# Patient Record
Sex: FEMALE | Race: WHITE | NOT HISPANIC OR LATINO | Employment: OTHER | URBAN - METROPOLITAN AREA
[De-identification: names, ages, dates, MRNs, and addresses within clinical notes are randomized per-mention and may not be internally consistent; named-entity substitution may affect disease eponyms.]

---

## 2018-05-30 ENCOUNTER — OFFICE VISIT (OUTPATIENT)
Dept: CARDIOLOGY | Facility: CLINIC | Age: 83
End: 2018-05-30
Payer: MEDICARE

## 2018-05-30 VITALS
SYSTOLIC BLOOD PRESSURE: 155 MMHG | TEMPERATURE: 72 F | HEIGHT: 63 IN | DIASTOLIC BLOOD PRESSURE: 80 MMHG | BODY MASS INDEX: 25.62 KG/M2 | WEIGHT: 144.63 LBS

## 2018-05-30 DIAGNOSIS — I10 ESSENTIAL HYPERTENSION: Chronic | ICD-10-CM

## 2018-05-30 DIAGNOSIS — E78.2 MIXED HYPERLIPIDEMIA: Chronic | ICD-10-CM

## 2018-05-30 DIAGNOSIS — Z95.810 AICD (AUTOMATIC CARDIOVERTER/DEFIBRILLATOR) PRESENT: Primary | Chronic | ICD-10-CM

## 2018-05-30 DIAGNOSIS — I25.10 CORONARY ARTERY DISEASE INVOLVING NATIVE CORONARY ARTERY OF NATIVE HEART WITHOUT ANGINA PECTORIS: Chronic | ICD-10-CM

## 2018-05-30 PROCEDURE — 99999 PR PBB SHADOW E&M-EST. PATIENT-LVL II: CPT | Mod: PBBFAC,,, | Performed by: INTERNAL MEDICINE

## 2018-05-30 PROCEDURE — 99212 OFFICE O/P EST SF 10 MIN: CPT | Mod: PBBFAC | Performed by: INTERNAL MEDICINE

## 2018-05-30 PROCEDURE — 99214 OFFICE O/P EST MOD 30 MIN: CPT | Mod: S$PBB,,, | Performed by: INTERNAL MEDICINE

## 2018-05-30 RX ORDER — CARVEDILOL 6.25 MG/1
6.25 TABLET ORAL 2 TIMES DAILY WITH MEALS
Qty: 180 TABLET | Refills: 3 | Status: SHIPPED | OUTPATIENT
Start: 2018-05-30

## 2018-05-30 RX ORDER — CARVEDILOL 6.25 MG/1
6.25 TABLET ORAL 2 TIMES DAILY WITH MEALS
COMMUNITY
End: 2018-05-30 | Stop reason: SDUPTHER

## 2018-05-30 RX ORDER — CLONAZEPAM 0.5 MG/1
0.5 TABLET ORAL NIGHTLY
COMMUNITY

## 2019-01-14 ENCOUNTER — TELEPHONE (OUTPATIENT)
Dept: CARDIOLOGY | Facility: CLINIC | Age: 84
End: 2019-01-14
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
## 2019-01-14 NOTE — TELEPHONE ENCOUNTER
----- Message from Kelly Zuluaga LPN sent at 1/10/2019 11:41 AM CST -----  Contact: Jordyn Mendenhall's Office  Rich states she need some info about patient's defibrillator and how it is being checked. Also she is faxing some paperwork to be filled out by you. She can be reached at [phone] Rich.

## 2022-08-19 ENCOUNTER — HOSPITAL ENCOUNTER (INPATIENT)
Facility: HOSPITAL | Age: 87
LOS: 3 days | Discharge: HOME HEALTH | DRG: 871 | End: 2022-08-22
Attending: EMERGENCY MEDICINE | Admitting: FAMILY MEDICINE
Payer: MEDICARE

## 2022-08-19 DIAGNOSIS — I25.9 CHRONIC ISCHEMIC HEART DISEASE: ICD-10-CM

## 2022-08-19 DIAGNOSIS — I21.4 NSTEMI (NON-ST ELEVATED MYOCARDIAL INFARCTION): ICD-10-CM

## 2022-08-19 DIAGNOSIS — R10.9 ABDOMINAL PAIN: ICD-10-CM

## 2022-08-19 DIAGNOSIS — I50.9 CHF (CONGESTIVE HEART FAILURE): ICD-10-CM

## 2022-08-19 DIAGNOSIS — M86.9 OSTEOMYELITIS OF RIGHT FOOT, UNSPECIFIED TYPE: ICD-10-CM

## 2022-08-19 DIAGNOSIS — M86.171 OTHER ACUTE OSTEOMYELITIS OF RIGHT FOOT: Primary | ICD-10-CM

## 2022-08-19 LAB
ALBUMIN SERPL BCP-MCNC: 3.7 G/DL (ref 3.5–5.2)
ALP SERPL-CCNC: 115 U/L (ref 55–135)
ALT SERPL W/O P-5'-P-CCNC: 33 U/L (ref 10–44)
ANION GAP SERPL CALC-SCNC: 17 MMOL/L (ref 8–16)
AST SERPL-CCNC: 53 U/L (ref 10–40)
BASOPHILS # BLD AUTO: 0.12 K/UL (ref 0–0.2)
BASOPHILS NFR BLD: 0.5 % (ref 0–1.9)
BILIRUB SERPL-MCNC: 0.7 MG/DL (ref 0.1–1)
BNP SERPL-MCNC: 428 PG/ML (ref 0–99)
BUN SERPL-MCNC: 29 MG/DL (ref 8–23)
CALCIUM SERPL-MCNC: 9.6 MG/DL (ref 8.7–10.5)
CHLORIDE SERPL-SCNC: 106 MMOL/L (ref 95–110)
CO2 SERPL-SCNC: 17 MMOL/L (ref 23–29)
CREAT SERPL-MCNC: 1.2 MG/DL (ref 0.5–1.4)
DIFFERENTIAL METHOD: ABNORMAL
EOSINOPHIL # BLD AUTO: 0 K/UL (ref 0–0.5)
EOSINOPHIL NFR BLD: 0.1 % (ref 0–8)
ERYTHROCYTE [DISTWIDTH] IN BLOOD BY AUTOMATED COUNT: 14.9 % (ref 11.5–14.5)
EST. GFR  (NO RACE VARIABLE): 43 ML/MIN/1.73 M^2
GLUCOSE SERPL-MCNC: 119 MG/DL (ref 70–110)
HCT VFR BLD AUTO: 46.7 % (ref 37–48.5)
HGB BLD-MCNC: 15.3 G/DL (ref 12–16)
IMM GRANULOCYTES # BLD AUTO: 0.29 K/UL (ref 0–0.04)
IMM GRANULOCYTES NFR BLD AUTO: 1.1 % (ref 0–0.5)
LIPASE SERPL-CCNC: 16 U/L (ref 4–60)
LYMPHOCYTES # BLD AUTO: 0.6 K/UL (ref 1–4.8)
LYMPHOCYTES NFR BLD: 2.2 % (ref 18–48)
MCH RBC QN AUTO: 28.9 PG (ref 27–31)
MCHC RBC AUTO-ENTMCNC: 32.8 G/DL (ref 32–36)
MCV RBC AUTO: 88 FL (ref 82–98)
MONOCYTES # BLD AUTO: 1.4 K/UL (ref 0.3–1)
MONOCYTES NFR BLD: 5.5 % (ref 4–15)
NEUTROPHILS # BLD AUTO: 23.5 K/UL (ref 1.8–7.7)
NEUTROPHILS NFR BLD: 90.6 % (ref 38–73)
NRBC BLD-RTO: 0 /100 WBC
PLATELET # BLD AUTO: 209 K/UL (ref 150–450)
PMV BLD AUTO: 9.2 FL (ref 9.2–12.9)
POTASSIUM SERPL-SCNC: 3.4 MMOL/L (ref 3.5–5.1)
PROT SERPL-MCNC: 7.8 G/DL (ref 6–8.4)
RBC # BLD AUTO: 5.29 M/UL (ref 4–5.4)
SODIUM SERPL-SCNC: 140 MMOL/L (ref 136–145)
TROPONIN I SERPL DL<=0.01 NG/ML-MCNC: 0.32 NG/ML (ref 0–0.03)
WBC # BLD AUTO: 25.94 K/UL (ref 3.9–12.7)

## 2022-08-19 PROCEDURE — 93010 EKG 12-LEAD: ICD-10-PCS | Mod: ,,, | Performed by: STUDENT IN AN ORGANIZED HEALTH CARE EDUCATION/TRAINING PROGRAM

## 2022-08-19 PROCEDURE — 84484 ASSAY OF TROPONIN QUANT: CPT | Mod: 91 | Performed by: EMERGENCY MEDICINE

## 2022-08-19 PROCEDURE — 80053 COMPREHEN METABOLIC PANEL: CPT | Performed by: EMERGENCY MEDICINE

## 2022-08-19 PROCEDURE — 11000001 HC ACUTE MED/SURG PRIVATE ROOM

## 2022-08-19 PROCEDURE — 99285 EMERGENCY DEPT VISIT HI MDM: CPT | Mod: 25

## 2022-08-19 PROCEDURE — 96375 TX/PRO/DX INJ NEW DRUG ADDON: CPT

## 2022-08-19 PROCEDURE — 83605 ASSAY OF LACTIC ACID: CPT | Performed by: FAMILY MEDICINE

## 2022-08-19 PROCEDURE — 93010 ELECTROCARDIOGRAM REPORT: CPT | Mod: ,,, | Performed by: STUDENT IN AN ORGANIZED HEALTH CARE EDUCATION/TRAINING PROGRAM

## 2022-08-19 PROCEDURE — 83690 ASSAY OF LIPASE: CPT | Performed by: EMERGENCY MEDICINE

## 2022-08-19 PROCEDURE — 93005 ELECTROCARDIOGRAM TRACING: CPT

## 2022-08-19 PROCEDURE — 85610 PROTHROMBIN TIME: CPT | Performed by: EMERGENCY MEDICINE

## 2022-08-19 PROCEDURE — 85025 COMPLETE CBC W/AUTO DIFF WBC: CPT | Performed by: EMERGENCY MEDICINE

## 2022-08-19 PROCEDURE — 83880 ASSAY OF NATRIURETIC PEPTIDE: CPT | Performed by: EMERGENCY MEDICINE

## 2022-08-19 PROCEDURE — 96374 THER/PROPH/DIAG INJ IV PUSH: CPT

## 2022-08-19 PROCEDURE — U0002 COVID-19 LAB TEST NON-CDC: HCPCS | Performed by: EMERGENCY MEDICINE

## 2022-08-19 PROCEDURE — 63600175 PHARM REV CODE 636 W HCPCS: Performed by: EMERGENCY MEDICINE

## 2022-08-19 PROCEDURE — 85730 THROMBOPLASTIN TIME PARTIAL: CPT | Performed by: EMERGENCY MEDICINE

## 2022-08-19 RX ORDER — CEFEPIME HYDROCHLORIDE 1 G/50ML
2 INJECTION, SOLUTION INTRAVENOUS
Status: DISCONTINUED | OUTPATIENT
Start: 2022-08-20 | End: 2022-08-22 | Stop reason: HOSPADM

## 2022-08-19 RX ORDER — HEPARIN SODIUM,PORCINE/D5W 25000/250
0-40 INTRAVENOUS SOLUTION INTRAVENOUS CONTINUOUS
Status: DISCONTINUED | OUTPATIENT
Start: 2022-08-19 | End: 2022-08-20

## 2022-08-19 RX ORDER — KETOROLAC TROMETHAMINE 30 MG/ML
15 INJECTION, SOLUTION INTRAMUSCULAR; INTRAVENOUS
Status: COMPLETED | OUTPATIENT
Start: 2022-08-19 | End: 2022-08-19

## 2022-08-19 RX ORDER — ACETAMINOPHEN 325 MG/1
650 TABLET ORAL EVERY 6 HOURS PRN
Status: DISCONTINUED | OUTPATIENT
Start: 2022-08-20 | End: 2022-08-22 | Stop reason: HOSPADM

## 2022-08-19 RX ORDER — FLUOXETINE HYDROCHLORIDE 20 MG/1
20 CAPSULE ORAL DAILY
Status: DISCONTINUED | OUTPATIENT
Start: 2022-08-20 | End: 2022-08-22 | Stop reason: HOSPADM

## 2022-08-19 RX ORDER — ONDANSETRON 2 MG/ML
4 INJECTION INTRAMUSCULAR; INTRAVENOUS
Status: COMPLETED | OUTPATIENT
Start: 2022-08-19 | End: 2022-08-19

## 2022-08-19 RX ORDER — ONDANSETRON 4 MG/1
4 TABLET, FILM COATED ORAL EVERY 6 HOURS PRN
Status: DISCONTINUED | OUTPATIENT
Start: 2022-08-20 | End: 2022-08-22 | Stop reason: HOSPADM

## 2022-08-19 RX ORDER — POTASSIUM CHLORIDE 20 MEQ/1
40 TABLET, EXTENDED RELEASE ORAL ONCE
Status: COMPLETED | OUTPATIENT
Start: 2022-08-20 | End: 2022-08-20

## 2022-08-19 RX ORDER — SODIUM CHLORIDE 9 MG/ML
INJECTION, SOLUTION INTRAVENOUS ONCE
Status: DISCONTINUED | OUTPATIENT
Start: 2022-08-20 | End: 2022-08-22 | Stop reason: HOSPADM

## 2022-08-19 RX ORDER — CLONAZEPAM 0.5 MG/1
0.5 TABLET ORAL NIGHTLY
Status: DISCONTINUED | OUTPATIENT
Start: 2022-08-20 | End: 2022-08-22 | Stop reason: HOSPADM

## 2022-08-19 RX ORDER — CARVEDILOL 6.25 MG/1
6.25 TABLET ORAL 2 TIMES DAILY WITH MEALS
Status: DISCONTINUED | OUTPATIENT
Start: 2022-08-20 | End: 2022-08-22 | Stop reason: HOSPADM

## 2022-08-19 RX ORDER — LEVOTHYROXINE SODIUM 75 UG/1
75 TABLET ORAL
Status: DISCONTINUED | OUTPATIENT
Start: 2022-08-20 | End: 2022-08-22 | Stop reason: HOSPADM

## 2022-08-19 RX ORDER — ASPIRIN 81 MG/1
81 TABLET ORAL DAILY
Status: DISCONTINUED | OUTPATIENT
Start: 2022-08-20 | End: 2022-08-22 | Stop reason: HOSPADM

## 2022-08-19 RX ORDER — HYDRALAZINE HYDROCHLORIDE 20 MG/ML
10 INJECTION INTRAMUSCULAR; INTRAVENOUS EVERY 6 HOURS PRN
Status: DISCONTINUED | OUTPATIENT
Start: 2022-08-20 | End: 2022-08-22 | Stop reason: HOSPADM

## 2022-08-19 RX ADMIN — KETOROLAC TROMETHAMINE 15 MG: 30 INJECTION, SOLUTION INTRAMUSCULAR at 09:08

## 2022-08-19 RX ADMIN — ONDANSETRON 4 MG: 2 INJECTION INTRAMUSCULAR; INTRAVENOUS at 09:08

## 2022-08-20 PROBLEM — L08.9 FOOT INFECTION: Status: ACTIVE | Noted: 2022-08-20

## 2022-08-20 PROBLEM — R65.10 SIRS (SYSTEMIC INFLAMMATORY RESPONSE SYNDROME): Status: ACTIVE | Noted: 2022-08-20

## 2022-08-20 PROBLEM — R65.20 SEVERE SEPSIS: Status: ACTIVE | Noted: 2022-08-20

## 2022-08-20 PROBLEM — A41.9 SEVERE SEPSIS: Status: ACTIVE | Noted: 2022-08-20

## 2022-08-20 LAB
ALBUMIN SERPL BCP-MCNC: 3.6 G/DL (ref 3.5–5.2)
ALP SERPL-CCNC: 105 U/L (ref 55–135)
ALT SERPL W/O P-5'-P-CCNC: 34 U/L (ref 10–44)
ANION GAP SERPL CALC-SCNC: 16 MMOL/L (ref 8–16)
AORTIC ROOT ANNULUS: 2.88 CM
APTT BLDCRRT: 31.2 SEC (ref 21–32)
APTT BLDCRRT: 41.3 SEC (ref 21–32)
APTT BLDCRRT: 51.8 SEC (ref 21–32)
ASCENDING AORTA: 2.64 CM
AST SERPL-CCNC: 46 U/L (ref 10–40)
AV INDEX (PROSTH): 0.72
AV MEAN GRADIENT: 5 MMHG
AV PEAK GRADIENT: 8 MMHG
AV VALVE AREA: 2.03 CM2
AV VELOCITY RATIO: 0.69
BACTERIA #/AREA URNS HPF: ABNORMAL /HPF
BASOPHILS # BLD AUTO: 0.1 K/UL (ref 0–0.2)
BASOPHILS NFR BLD: 0.4 % (ref 0–1.9)
BILIRUB SERPL-MCNC: 0.9 MG/DL (ref 0.1–1)
BILIRUB UR QL STRIP: NEGATIVE
BSA FOR ECHO PROCEDURE: 1.73 M2
BUN SERPL-MCNC: 33 MG/DL (ref 8–23)
CALCIUM SERPL-MCNC: 9.2 MG/DL (ref 8.7–10.5)
CHLORIDE SERPL-SCNC: 104 MMOL/L (ref 95–110)
CLARITY UR: ABNORMAL
CO2 SERPL-SCNC: 22 MMOL/L (ref 23–29)
COLOR UR: YELLOW
CREAT SERPL-MCNC: 1.3 MG/DL (ref 0.5–1.4)
CRP SERPL-MCNC: 101.8 MG/L (ref 0–8.2)
CV ECHO LV RWT: 0.91 CM
DIFFERENTIAL METHOD: ABNORMAL
DOP CALC AO PEAK VEL: 1.4 M/S
DOP CALC AO VTI: 27.8 CM
DOP CALC LVOT AREA: 2.8 CM2
DOP CALC LVOT DIAMETER: 1.9 CM
DOP CALC LVOT PEAK VEL: 0.97 M/S
DOP CALC LVOT STROKE VOLUME: 56.39 CM3
DOP CALC RVOT PEAK VEL: 0.6 M/S
DOP CALC RVOT VTI: 12.4 CM
DOP CALCLVOT PEAK VEL VTI: 19.9 CM
E WAVE DECELERATION TIME: 320.37 MSEC
E/A RATIO: 0.75
E/E' RATIO: 20.67 M/S
ECHO LV POSTERIOR WALL: 1.5 CM (ref 0.6–1.1)
EJECTION FRACTION: 55 %
EOSINOPHIL # BLD AUTO: 0 K/UL (ref 0–0.5)
EOSINOPHIL NFR BLD: 0.1 % (ref 0–8)
ERYTHROCYTE [DISTWIDTH] IN BLOOD BY AUTOMATED COUNT: 15.4 % (ref 11.5–14.5)
ERYTHROCYTE [SEDIMENTATION RATE] IN BLOOD BY WESTERGREN METHOD: 18 MM/HR (ref 0–20)
EST. GFR  (NO RACE VARIABLE): 39 ML/MIN/1.73 M^2
FRACTIONAL SHORTENING: 27 % (ref 28–44)
GLUCOSE SERPL-MCNC: 98 MG/DL (ref 70–110)
GLUCOSE UR QL STRIP: NEGATIVE
HCT VFR BLD AUTO: 48.6 % (ref 37–48.5)
HGB BLD-MCNC: 14.9 G/DL (ref 12–16)
HGB UR QL STRIP: ABNORMAL
HYALINE CASTS #/AREA URNS LPF: 0 /LPF
IMM GRANULOCYTES # BLD AUTO: 0.13 K/UL (ref 0–0.04)
IMM GRANULOCYTES NFR BLD AUTO: 0.5 % (ref 0–0.5)
INR PPP: 1.1 (ref 0.8–1.2)
INTERVENTRICULAR SEPTUM: 1.69 CM (ref 0.6–1.1)
IVC DIAMETER: 1.54 CM
IVRT: 60.89 MSEC
KETONES UR QL STRIP: NEGATIVE
LA MAJOR: 4.89 CM
LA MINOR: 4.76 CM
LA WIDTH: 3.8 CM
LACTATE SERPL-SCNC: 1.8 MMOL/L (ref 0.5–2.2)
LEFT ATRIUM SIZE: 3.5 CM
LEFT ATRIUM VOLUME INDEX: 32.3 ML/M2
LEFT ATRIUM VOLUME: 54.54 CM3
LEFT INTERNAL DIMENSION IN SYSTOLE: 2.41 CM (ref 2.1–4)
LEFT VENTRICLE DIASTOLIC VOLUME INDEX: 26.38 ML/M2
LEFT VENTRICLE DIASTOLIC VOLUME: 44.58 ML
LEFT VENTRICLE MASS INDEX: 118 G/M2
LEFT VENTRICLE SYSTOLIC VOLUME INDEX: 12.1 ML/M2
LEFT VENTRICLE SYSTOLIC VOLUME: 20.42 ML
LEFT VENTRICULAR INTERNAL DIMENSION IN DIASTOLE: 3.31 CM (ref 3.5–6)
LEFT VENTRICULAR MASS: 198.92 G
LEUKOCYTE ESTERASE UR QL STRIP: ABNORMAL
LV LATERAL E/E' RATIO: 18.6 M/S
LV SEPTAL E/E' RATIO: 23.25 M/S
LVOT MG: 2.79 MMHG
LVOT MV: 0.83 CM/S
LYMPHOCYTES # BLD AUTO: 1 K/UL (ref 1–4.8)
LYMPHOCYTES NFR BLD: 4.3 % (ref 18–48)
MCH RBC QN AUTO: 28.1 PG (ref 27–31)
MCHC RBC AUTO-ENTMCNC: 30.7 G/DL (ref 32–36)
MCV RBC AUTO: 92 FL (ref 82–98)
MICROSCOPIC COMMENT: ABNORMAL
MONOCYTES # BLD AUTO: 0.9 K/UL (ref 0.3–1)
MONOCYTES NFR BLD: 3.6 % (ref 4–15)
MV PEAK A VEL: 1.24 M/S
MV PEAK E VEL: 0.93 M/S
MV STENOSIS PRESSURE HALF TIME: 92.91 MS
MV VALVE AREA P 1/2 METHOD: 2.37 CM2
NEUTROPHILS # BLD AUTO: 21.6 K/UL (ref 1.8–7.7)
NEUTROPHILS NFR BLD: 91.1 % (ref 38–73)
NITRITE UR QL STRIP: NEGATIVE
NRBC BLD-RTO: 0 /100 WBC
PH UR STRIP: 6 [PH] (ref 5–8)
PISA MRMAX VEL: 3.37 M/S
PISA TR MAX VEL: 2.34 M/S
PLATELET # BLD AUTO: 251 K/UL (ref 150–450)
PMV BLD AUTO: 11.1 FL (ref 9.2–12.9)
POTASSIUM SERPL-SCNC: 4.3 MMOL/L (ref 3.5–5.1)
PROT SERPL-MCNC: 7.1 G/DL (ref 6–8.4)
PROT UR QL STRIP: ABNORMAL
PROTHROMBIN TIME: 11.4 SEC (ref 9–12.5)
PV MEAN GRADIENT: 0.64 MMHG
RA MAJOR: 3.84 CM
RA PRESSURE: 3 MMHG
RA WIDTH: 2.9 CM
RBC # BLD AUTO: 5.3 M/UL (ref 4–5.4)
RBC #/AREA URNS HPF: 11 /HPF (ref 0–4)
SARS-COV-2 RDRP RESP QL NAA+PROBE: NEGATIVE
SODIUM SERPL-SCNC: 142 MMOL/L (ref 136–145)
SP GR UR STRIP: 1.02 (ref 1–1.03)
SQUAMOUS #/AREA URNS HPF: 6 /HPF
STJ: 2.74 CM
TDI LATERAL: 0.05 M/S
TDI SEPTAL: 0.04 M/S
TDI: 0.05 M/S
TR MAX PG: 22 MMHG
TR MEAN GRADIENT: 19 MMHG
TRICUSPID ANNULAR PLANE SYSTOLIC EXCURSION: 1.8 CM
TROPONIN I SERPL DL<=0.01 NG/ML-MCNC: 0.36 NG/ML (ref 0–0.03)
TROPONIN I SERPL DL<=0.01 NG/ML-MCNC: 0.4 NG/ML (ref 0–0.03)
TV REST PULMONARY ARTERY PRESSURE: 25 MMHG
URN SPEC COLLECT METH UR: ABNORMAL
UROBILINOGEN UR STRIP-ACNC: NEGATIVE EU/DL
WBC # BLD AUTO: 23.71 K/UL (ref 3.9–12.7)
WBC #/AREA URNS HPF: >100 /HPF (ref 0–5)
WBC CLUMPS URNS QL MICRO: ABNORMAL

## 2022-08-20 PROCEDURE — 87186 SC STD MICRODIL/AGAR DIL: CPT | Mod: 59 | Performed by: PODIATRIST

## 2022-08-20 PROCEDURE — 36415 COLL VENOUS BLD VENIPUNCTURE: CPT | Performed by: EMERGENCY MEDICINE

## 2022-08-20 PROCEDURE — 87088 URINE BACTERIA CULTURE: CPT | Performed by: EMERGENCY MEDICINE

## 2022-08-20 PROCEDURE — 81000 URINALYSIS NONAUTO W/SCOPE: CPT | Performed by: EMERGENCY MEDICINE

## 2022-08-20 PROCEDURE — 21400001 HC TELEMETRY ROOM

## 2022-08-20 PROCEDURE — 63600175 PHARM REV CODE 636 W HCPCS: Performed by: EMERGENCY MEDICINE

## 2022-08-20 PROCEDURE — 87040 BLOOD CULTURE FOR BACTERIA: CPT | Mod: 59 | Performed by: FAMILY MEDICINE

## 2022-08-20 PROCEDURE — 99222 PR INITIAL HOSPITAL CARE,LEVL II: ICD-10-PCS | Mod: 25,,, | Performed by: STUDENT IN AN ORGANIZED HEALTH CARE EDUCATION/TRAINING PROGRAM

## 2022-08-20 PROCEDURE — 87077 CULTURE AEROBIC IDENTIFY: CPT | Mod: 59 | Performed by: PODIATRIST

## 2022-08-20 PROCEDURE — 87077 CULTURE AEROBIC IDENTIFY: CPT | Performed by: EMERGENCY MEDICINE

## 2022-08-20 PROCEDURE — 86140 C-REACTIVE PROTEIN: CPT | Performed by: FAMILY MEDICINE

## 2022-08-20 PROCEDURE — 87070 CULTURE OTHR SPECIMN AEROBIC: CPT | Performed by: PODIATRIST

## 2022-08-20 PROCEDURE — 99222 1ST HOSP IP/OBS MODERATE 55: CPT | Mod: 25,,, | Performed by: STUDENT IN AN ORGANIZED HEALTH CARE EDUCATION/TRAINING PROGRAM

## 2022-08-20 PROCEDURE — 85025 COMPLETE CBC W/AUTO DIFF WBC: CPT | Performed by: EMERGENCY MEDICINE

## 2022-08-20 PROCEDURE — 80053 COMPREHEN METABOLIC PANEL: CPT | Performed by: STUDENT IN AN ORGANIZED HEALTH CARE EDUCATION/TRAINING PROGRAM

## 2022-08-20 PROCEDURE — 85651 RBC SED RATE NONAUTOMATED: CPT | Performed by: FAMILY MEDICINE

## 2022-08-20 PROCEDURE — 87086 URINE CULTURE/COLONY COUNT: CPT | Performed by: EMERGENCY MEDICINE

## 2022-08-20 PROCEDURE — 36415 COLL VENOUS BLD VENIPUNCTURE: CPT | Performed by: FAMILY MEDICINE

## 2022-08-20 PROCEDURE — 99222 PR INITIAL HOSPITAL CARE,LEVL II: ICD-10-PCS | Mod: ,,, | Performed by: PODIATRIST

## 2022-08-20 PROCEDURE — 85730 THROMBOPLASTIN TIME PARTIAL: CPT | Mod: 91 | Performed by: FAMILY MEDICINE

## 2022-08-20 PROCEDURE — 87186 SC STD MICRODIL/AGAR DIL: CPT | Performed by: EMERGENCY MEDICINE

## 2022-08-20 PROCEDURE — 84484 ASSAY OF TROPONIN QUANT: CPT | Performed by: FAMILY MEDICINE

## 2022-08-20 PROCEDURE — 85730 THROMBOPLASTIN TIME PARTIAL: CPT | Performed by: FAMILY MEDICINE

## 2022-08-20 PROCEDURE — 63600175 PHARM REV CODE 636 W HCPCS: Performed by: FAMILY MEDICINE

## 2022-08-20 PROCEDURE — 36415 COLL VENOUS BLD VENIPUNCTURE: CPT | Performed by: STUDENT IN AN ORGANIZED HEALTH CARE EDUCATION/TRAINING PROGRAM

## 2022-08-20 PROCEDURE — 25000003 PHARM REV CODE 250: Performed by: FAMILY MEDICINE

## 2022-08-20 PROCEDURE — 99222 1ST HOSP IP/OBS MODERATE 55: CPT | Mod: ,,, | Performed by: PODIATRIST

## 2022-08-20 RX ORDER — SERTRALINE HYDROCHLORIDE 25 MG/1
25 TABLET, FILM COATED ORAL DAILY
COMMUNITY
End: 2022-08-22

## 2022-08-20 RX ORDER — ASPIRIN 325 MG
325 TABLET, DELAYED RELEASE (ENTERIC COATED) ORAL DAILY
COMMUNITY

## 2022-08-20 RX ORDER — HYDROCODONE BITARTRATE AND ACETAMINOPHEN 5; 325 MG/1; MG/1
1 TABLET ORAL EVERY 6 HOURS PRN
Status: DISCONTINUED | OUTPATIENT
Start: 2022-08-20 | End: 2022-08-22

## 2022-08-20 RX ADMIN — CLONAZEPAM 0.5 MG: 0.5 TABLET ORAL at 09:08

## 2022-08-20 RX ADMIN — HYDRALAZINE HYDROCHLORIDE 10 MG: 20 INJECTION, SOLUTION INTRAMUSCULAR; INTRAVENOUS at 05:08

## 2022-08-20 RX ADMIN — CARVEDILOL 6.25 MG: 6.25 TABLET, FILM COATED ORAL at 05:08

## 2022-08-20 RX ADMIN — LEVOTHYROXINE SODIUM 75 MCG: 75 TABLET ORAL at 06:08

## 2022-08-20 RX ADMIN — ACETAMINOPHEN 650 MG: 325 TABLET ORAL at 06:08

## 2022-08-20 RX ADMIN — POTASSIUM CHLORIDE 40 MEQ: 1500 TABLET, EXTENDED RELEASE ORAL at 02:08

## 2022-08-20 RX ADMIN — FLUOXETINE 20 MG: 20 CAPSULE ORAL at 09:08

## 2022-08-20 RX ADMIN — HYDROCODONE BITARTRATE AND ACETAMINOPHEN 1 TABLET: 5; 325 TABLET ORAL at 10:08

## 2022-08-20 RX ADMIN — CEFEPIME 2 G: 2 INJECTION, POWDER, FOR SOLUTION INTRAVENOUS at 02:08

## 2022-08-20 RX ADMIN — CARVEDILOL 6.25 MG: 6.25 TABLET, FILM COATED ORAL at 09:08

## 2022-08-20 RX ADMIN — VANCOMYCIN HYDROCHLORIDE 1500 MG: 1.5 INJECTION, POWDER, LYOPHILIZED, FOR SOLUTION INTRAVENOUS at 06:08

## 2022-08-20 RX ADMIN — HEPARIN SODIUM 12 UNITS/KG/HR: 10000 INJECTION, SOLUTION INTRAVENOUS at 02:08

## 2022-08-20 RX ADMIN — ASPIRIN 81 MG: 81 TABLET, COATED ORAL at 09:08

## 2022-08-20 NOTE — CARE UPDATE
Reviewed xray of left foot, suspect osteomyelitis of 2nd toe. Podiatry consulted. Awaiting recs from Cardiology. Troponin trend down. Continue IV antibiotics, mild decrease in WBC count. Afebrile.     Advance Care Planning     Date: 08/20/2022  Per discussion with patient and daughter, patient stated she is a DNI. Patient stated in the event her heart stops she wants compressions attempted but does not want to be intubated. Educated patient intubation generally follows full compressions due to trauma to the chest, patient stated understanding but continued to state she does not want to Intubated in the event of cardiac arrest.

## 2022-08-20 NOTE — SUBJECTIVE & OBJECTIVE
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    Review of patient's allergies indicates:   Allergen Reactions    Zetia [ezetimibe]     Atorvastatin     Avelox [moxifloxacin]     Erythromycin     Lisinopril     Norvasc [amlodipine]     Pcn [penicillins]     Statins-hmg-coa reductase inhibitors        No current facility-administered medications on file prior to encounter.     Current Outpatient Medications on File Prior to Encounter   Medication Sig    aspirin (ECOTRIN) 81 MG EC tablet Take 81 mg by mouth once daily.    carvedilol (COREG) 6.25 MG tablet Take 1 tablet (6.25 mg total) by mouth 2 (two) times daily with meals.    clonazePAM (KLONOPIN) 0.5 MG tablet Take 0.5 mg by mouth every evening.    cloNIDine (CATAPRES) 0.2 MG tablet Take 1 tablet by mouth every evening.    fluoxetine (PROZAC) 20 MG tablet Take 20 mg by mouth once daily.    levothyroxine (SYNTHROID) 75 MCG tablet Take 75 mcg by mouth once daily.    naproxen sodium (ALEVE) 220 mg Cap Take 1 tablet by mouth every evening. Aleve PM    omega-3 fatty acids (FISH OIL CONCENTRATE ORAL) Take 1,000 mg by mouth once daily.    triamterene-hydrochlorothiazide 37.5-25 mg (MAXZIDE-25) 37.5-25 mg per tablet Take 0.5 tablets by mouth once daily.     Family History       Problem Relation (Age of Onset)     Heart attack Father    Hyperlipidemia Father    Hypertension Father, Daughter          Tobacco Use    Smoking status: Never Smoker    Smokeless tobacco: Never Used   Substance and Sexual Activity    Alcohol use: Yes     Alcohol/week: 7.0 standard drinks     Types: 7 Glasses of wine per week    Drug use: No    Sexual activity: Not on file     Review of Systems   Constitutional:  Negative for fatigue and fever.   HENT:  Negative for sinus pressure.    Eyes:  Negative for visual disturbance.   Respiratory:  Negative for shortness of breath.    Cardiovascular:  Negative for chest pain.   Gastrointestinal:  Positive for abdominal pain, nausea and vomiting.   Genitourinary:  Negative for difficulty urinating.   Musculoskeletal:  Negative for back pain.   Skin:  Negative for rash.   Neurological:  Negative for headaches.   Psychiatric/Behavioral:  Negative for confusion.    Objective:     Vital Signs (Most Recent):  Temp: 98.9 °F (37.2 °C) (08/19/22 1957)  Pulse: 87 (08/19/22 2329)  Resp: 20 (08/19/22 2246)  BP: (!) 160/72 (08/19/22 2329)  SpO2: 97 % (08/19/22 2207)   Vital Signs (24h Range):  Temp:  [98.9 °F (37.2 °C)] 98.9 °F (37.2 °C)  Pulse:  [] 87  Resp:  [20-34] 20  SpO2:  [94 %-97 %] 97 %  BP: (160-176)/() 160/72     Weight: 70.3 kg (155 lb)  Body mass index is 29.29 kg/m².    Physical Exam  Constitutional:       General: She is not in acute distress.     Appearance: She is well-developed. She is not diaphoretic.   HENT:      Head: Normocephalic and atraumatic.   Eyes:      Pupils: Pupils are equal, round, and reactive to light.   Cardiovascular:      Rate and Rhythm: Normal rate and regular rhythm.      Heart sounds: Normal heart sounds. No murmur heard.    No friction rub. No gallop.      Comments: aicd  Pulmonary:      Effort: Pulmonary effort is normal. No respiratory distress.      Breath sounds: Normal breath sounds. No stridor. No wheezing or rales.   Abdominal:      General: Bowel sounds are  normal. There is no distension.      Palpations: Abdomen is soft. There is no mass.      Tenderness: There is no abdominal tenderness. There is no guarding.   Musculoskeletal:      Right lower leg: No edema.      Left lower leg: No edema.   Skin:     General: Skin is warm.      Findings: No erythema.      Comments: Left foot wound on dorsum, purulence expressed; right 2nd digit toe discoloration   Neurological:      Mental Status: She is alert and oriented to person, place, and time.         CRANIAL NERVES     CN III, IV, VI   Pupils are equal, round, and reactive to light.     Significant Labs:   Results for orders placed or performed during the hospital encounter of 08/19/22   CBC W/ AUTO DIFFERENTIAL   Result Value Ref Range    WBC 25.94 (H) 3.90 - 12.70 K/uL    RBC 5.29 4.00 - 5.40 M/uL    Hemoglobin 15.3 12.0 - 16.0 g/dL    Hematocrit 46.7 37.0 - 48.5 %    MCV 88 82 - 98 fL    MCH 28.9 27.0 - 31.0 pg    MCHC 32.8 32.0 - 36.0 g/dL    RDW 14.9 (H) 11.5 - 14.5 %    Platelets 209 150 - 450 K/uL    MPV 9.2 9.2 - 12.9 fL    Immature Granulocytes 1.1 (H) 0.0 - 0.5 %    Gran # (ANC) 23.5 (H) 1.8 - 7.7 K/uL    Immature Grans (Abs) 0.29 (H) 0.00 - 0.04 K/uL    Lymph # 0.6 (L) 1.0 - 4.8 K/uL    Mono # 1.4 (H) 0.3 - 1.0 K/uL    Eos # 0.0 0.0 - 0.5 K/uL    Baso # 0.12 0.00 - 0.20 K/uL    nRBC 0 0 /100 WBC    Gran % 90.6 (H) 38.0 - 73.0 %    Lymph % 2.2 (L) 18.0 - 48.0 %    Mono % 5.5 4.0 - 15.0 %    Eosinophil % 0.1 0.0 - 8.0 %    Basophil % 0.5 0.0 - 1.9 %    Differential Method Automated    Comp. Metabolic Panel   Result Value Ref Range    Sodium 140 136 - 145 mmol/L    Potassium 3.4 (L) 3.5 - 5.1 mmol/L    Chloride 106 95 - 110 mmol/L    CO2 17 (L) 23 - 29 mmol/L    Glucose 119 (H) 70 - 110 mg/dL    BUN 29 (H) 8 - 23 mg/dL    Creatinine 1.2 0.5 - 1.4 mg/dL    Calcium 9.6 8.7 - 10.5 mg/dL    Total Protein 7.8 6.0 - 8.4 g/dL    Albumin 3.7 3.5 - 5.2 g/dL    Total Bilirubin 0.7 0.1 - 1.0 mg/dL    Alkaline Phosphatase 115 55  - 135 U/L    AST 53 (H) 10 - 40 U/L    ALT 33 10 - 44 U/L    Anion Gap 17 (H) 8 - 16 mmol/L    eGFR 43 (A) >60 mL/min/1.73 m^2   Lipase   Result Value Ref Range    Lipase 16 4 - 60 U/L   Troponin I #1   Result Value Ref Range    Troponin I 0.317 (H) 0.000 - 0.026 ng/mL   BNP   Result Value Ref Range     (H) 0 - 99 pg/mL   Troponin I #2   Result Value Ref Range    Troponin I 0.403 (H) 0.000 - 0.026 ng/mL   APTT   Result Value Ref Range    aPTT 31.2 21.0 - 32.0 sec   Protime-INR   Result Value Ref Range    Prothrombin Time 11.4 9.0 - 12.5 sec    INR 1.1 0.8 - 1.2   COVID-19 Rapid Screening   Result Value Ref Range    SARS-CoV-2 RNA, Amplification, Qual Negative Negative   Lactic acid, plasma   Result Value Ref Range    Lactate (Lactic Acid) 1.8 0.5 - 2.2 mmol/L        Significant Imaging: X-Ray Chest AP Portable  Narrative: EXAMINATION:  XR CHEST AP PORTABLE    CLINICAL HISTORY:  abd pain;    TECHNIQUE:  Single frontal view of the chest was performed.    COMPARISON:  None    FINDINGS:  Left chest pacemaker.  Mild interstitial prominence.The lungs are clear, with normal appearance of pulmonary vasculature and no pleural effusion or pneumothorax.    The cardiac silhouette is normal in size. The hilar and mediastinal contours are unremarkable.    Bones are intact.  Impression: No acute abnormality.    Electronically signed by: Jason Garsia  Date:    08/19/2022  Time:    21:44  CT Abdomen Pelvis  Without Contrast  Narrative: EXAMINATION:  CT ABDOMEN PELVIS WITHOUT CONTRAST    CLINICAL HISTORY:  Abdominal abscess/infection suspected;    TECHNIQUE:  Low dose axial images, sagittal and coronal reformations were obtained from the lung bases to the pubic symphysis, 30 mL of oral Omnipaque 350 was administered..    COMPARISON:  None    FINDINGS:  Heart: Pacemaker lead.  No pericardial effusion as far seen.    Lung Bases: Right anterior lung base pulmonary nodules measuring up to 9 mm.  Mild subsegmental  atelectasis.    Liver: Hepatomegaly with no focal hepatic lesions.    Gallbladder: No calcified gallstones.  Slight hyperdensity may relate to sludge    Bile Ducts: No evidence of dilated ducts.    Pancreas: No mass or peripancreatic fat stranding.    Spleen: Focal splenic calcification.    Adrenals: Unremarkable.    Kidneys/ Ureters: Left renal cyst measuring 2.7 x 2.1 cm.    Bladder: No evidence of wall thickening.    Reproductive organs: Status post hysterectomy    GI Tract/Mesentery: No evidence of bowel obstruction or inflammation. No secondary signs of appendicitis.  Colonic diverticulosis.  Hiatal hernia    Peritoneal Space: No ascites. No free air.  16 x 16 mm peripheral low attending partially calcified lesion in the left mid abdomen may relate to epiploic appendagitis of uncertain clinical significance.  Correlate to site of pain.  See coronal image 45, series 601    Retroperitoneum: No significant adenopathy.    Abdominal wall: Fat containing umbilical hernia.    Vasculature: No aneurysm.    Bones: No acute fracture.  Impression: 16 x 16 mm peripheral low attending partially calcified lesion in the left mid abdomen may relate to epiploic appendagitis of uncertain clinical significance. Correlate to site of pain. See coronal image 45, series 601    Right anterior lung base pulmonary nodules measuring up to 9 mm.    Atherosclerotic changes    Small hiatal hernia; renal cyst; focal splenic calcification, colonic diverticulosis    No evidence for abscess    All CT scans   are performed using dose optimization techniques including the following: automated exposure control; adjustment of the mA and/or kV; use of iterative reconstruction technique.  Dose modulation was employed for ALARA by means of: Automated exposure control; adjustment of the mA and/or kV according to patient size (this includes techniques or standardized protocols for targeted exams where dose is matched to indication/reason for exam; i.e.  extremities or head); and/or use of iterative reconstructive technique.    Electronically signed by: Jason Garsia  Date:    08/19/2022  Time:    21:28

## 2022-08-20 NOTE — ED PROVIDER NOTES
SCRIBE #1 NOTE: I, Doug Lynch, am scribing for, and in the presence of, Alber Carr Jr., MD. I have scribed the entire note.       History     Chief Complaint   Patient presents with    Shortness of Breath     Shortness of breath since panic attack at noon today, now c/o CP on inhalation radiating to left shoulder, BBS = Bilat per [facility], O2 92% RA     Review of patient's allergies indicates:   Allergen Reactions    Zetia [ezetimibe]     Atorvastatin     Avelox [moxifloxacin]     Erythromycin     Lisinopril     Norvasc [amlodipine]     Pcn [penicillins]     Statins-hmg-coa reductase inhibitors     Cefepime Rash         History of Present Illness     HPI    8/19/2022, 8:56 PM  History obtained from the patient      History of Present Illness: Marysol Yancey is a 89 y.o. female patient with a PMHx of hysterectomy, appendix removal, MI, CHF, HLD, and defibrillator implant in L chest (installed in 2013) who presents to the Emergency Department for evaluation of R-side abdominal pain which onset approx. 13:30-14:00 today. Symptoms are constant and moderate in severity. Pt reports sxs are worsened with deep breaths. Associated sxs include dizziness, n/v, SOB, and tremors of upper body. Patient denies any fever, diarrhea, dysuria, hematuria, syncope, light-headedness, CP, and all other sxs at this time. Pt reports taking clonazepam today which helped to alleviate some sxs. Pt also reports of taking low-dose aspirin regularly. Per pt, she has experienced this abdominal pain x 2 times in recent past. No further complaints or concerns at this time.       Arrival mode: Ambulance Service    PCP: Bruno Nugent MD        Past Medical History:  Past Medical History:   Diagnosis Date    Abnormal ECG 12/17/2014    AICD (automatic cardioverter/defibrillator) present 6/14/2013    CAD (coronary artery disease) 6/14/2013    Cancer     basil cell carcinoma    Cardiac arrest 6/14/2013    CHF (congestive heart failure)     CRI  (chronic renal insufficiency) 6/14/2013    Depression 6/14/2013    Diabetes mellitus     Fatigue 6/14/2013    Heart block     Hyperlipidemia     Hypertension     LBBB (left bundle branch block) 12/17/2014    Mitral regurgitation 6/14/2013    Mixed hyperlipidemia 6/14/2013    Pulmonary nodule 6/14/2013    Thyroid disease     TR (tricuspid regurgitation) 6/14/2013    Ventricular fibrillation 6/14/2013       Past Surgical History:  No past surgical history on file.      Family History:  Family History   Problem Relation Age of Onset    Heart attack Father     Hyperlipidemia Father     Hypertension Father     Anemia Neg Hx     Arrhythmia Neg Hx     Asthma Neg Hx     Clotting disorder Neg Hx     Fainting Neg Hx     Heart disease Neg Hx     Heart failure Neg Hx     Hypertension Daughter        Social History:  Social History     Tobacco Use    Smoking status: Never    Smokeless tobacco: Never   Substance and Sexual Activity    Alcohol use: Yes     Alcohol/week: 7.0 standard drinks     Types: 7 Glasses of wine per week    Drug use: No    Sexual activity: Not on file        Review of Systems     Review of Systems   Constitutional:  Negative for fever.   HENT:  Negative for sore throat.    Respiratory:  Positive for shortness of breath.    Cardiovascular:  Negative for chest pain.   Gastrointestinal:  Positive for abdominal pain (R side), nausea and vomiting. Negative for diarrhea.   Genitourinary:  Negative for dysuria and hematuria.   Musculoskeletal:  Negative for back pain.   Skin:  Negative for rash.   Neurological:  Positive for dizziness and tremors (upper body). Negative for syncope, weakness and light-headedness.   Hematological:  Does not bruise/bleed easily.   All other systems reviewed and are negative.   Physical Exam     Initial Vitals [08/19/22 1957]   BP Pulse Resp Temp SpO2   (!) 173/103 110 20 98.9 °F (37.2 °C) (!) 94 %      MAP       --          Physical Exam  Nursing Notes and Vital Signs  Reviewed.  Constitutional: Patient is in no acute distress. Well-developed and well-nourished.  Head: Atraumatic. Normocephalic.  Eyes: PERRL. EOM intact. Conjunctivae are not pale. No scleral icterus.  ENT: Mucous membranes are moist. Oropharynx is clear and symmetric.    Neck: Supple. Full ROM.  Cardiovascular: Regular rate. Regular rhythm. No murmurs, rubs, or gallops. Distal pulses are 2+ and symmetric.  Pulmonary/Chest: No respiratory distress. Clear to auscultation bilaterally. No wheezing or rales.  Abdominal: Soft and non-distended.  There is no tenderness.  No rebound, guarding, or rigidity.  Musculoskeletal: Moves all extremities. No obvious deformities. No edema.  Skin: Warm and dry.  Neurological:  Alert, awake, and oriented x 4. GCS 15. Answers questions appropriately. Normal speech. No acute focal neurological deficits are appreciated.  Psychiatric: Normal affect. Good eye contact. Appropriate in content.     ED Course   Critical Care    Date/Time: 9/9/2022 11:13 AM  Performed by: Alber Carr Jr., MD  Authorized by: Alber Carr Jr., MD   Direct patient critical care time: 10 minutes  Additional history critical care time: 10 minutes  Ordering / reviewing critical care time: 10 minutes  Documentation critical care time: 10 minutes  Consulting other physicians critical care time: 10 minutes  Consult with family critical care time: 10 minutes  Other critical care time: 15 minutes  Total critical care time (exclusive of procedural time) : 75 minutes  Critical care time was exclusive of separately billable procedures and treating other patients and teaching time.  Critical care was necessary to treat or prevent imminent or life-threatening deterioration of the following conditions: cardiac failure.  Critical care was time spent personally by me on the following activities: development of treatment plan with patient or surrogate, discussions with consultants, discussions with primary provider,  interpretation of cardiac output measurements, evaluation of patient's response to treatment, examination of patient, obtaining history from patient or surrogate, ordering and performing treatments and interventions, ordering and review of laboratory studies, ordering and review of radiographic studies, pulse oximetry, re-evaluation of patient's condition and review of old charts.      ED Vital Signs:  Vitals:    08/21/22 2100 08/21/22 2300 08/22/22 0100 08/22/22 0300   BP:   (!) 144/74    Pulse: 79 71 72 66   Resp:   18    Temp:   98.1 °F (36.7 °C)    TempSrc:   Oral    SpO2:   (!) 93%    Weight:       Height:        08/22/22 0437 08/22/22 0500 08/22/22 0504 08/22/22 0603   BP: (!) 188/82 (!) 180/79 (!) 213/91 (!) 116/57   Pulse: 68 69  68   Resp: 16      Temp: 98.3 °F (36.8 °C)      TempSrc: Oral      SpO2: 95%   96%   Weight: 65.7 kg (144 lb 13.5 oz)      Height:        08/22/22 0716 08/22/22 0915 08/22/22 1142 08/22/22 1231   BP: (!) 160/72   (!) 140/61   Pulse: 74 85 71 70   Resp: 18   18   Temp: 98.4 °F (36.9 °C)   97.9 °F (36.6 °C)   TempSrc: Oral   Oral   SpO2: 96%   96%   Weight:       Height:        08/22/22 1321 08/22/22 1506 08/22/22 1551   BP:   (!) 181/82   Pulse: 68 69 75   Resp:   18   Temp:   98.1 °F (36.7 °C)   TempSrc:   Oral   SpO2:   96%   Weight:      Height:          Abnormal Lab Results:  Labs Reviewed   CBC W/ AUTO DIFFERENTIAL - Abnormal; Notable for the following components:       Result Value    WBC 25.94 (*)     RDW 14.9 (*)     Immature Granulocytes 1.1 (*)     Gran # (ANC) 23.5 (*)     Immature Grans (Abs) 0.29 (*)     Lymph # 0.6 (*)     Mono # 1.4 (*)     Gran % 90.6 (*)     Lymph % 2.2 (*)     All other components within normal limits   COMPREHENSIVE METABOLIC PANEL - Abnormal; Notable for the following components:    Potassium 3.4 (*)     CO2 17 (*)     Glucose 119 (*)     BUN 29 (*)     AST 53 (*)     Anion Gap 17 (*)     eGFR 43 (*)     All other components within normal limits    URINALYSIS, REFLEX TO URINE CULTURE - Abnormal; Notable for the following components:    Appearance, UA Hazy (*)     Protein, UA 1+ (*)     Occult Blood UA 2+ (*)     Leukocytes, UA 3+ (*)     All other components within normal limits    Narrative:     Specimen Source->Urine   TROPONIN I - Abnormal; Notable for the following components:    Troponin I 0.317 (*)     All other components within normal limits   B-TYPE NATRIURETIC PEPTIDE - Abnormal; Notable for the following components:     (*)     All other components within normal limits   TROPONIN I - Abnormal; Notable for the following components:    Troponin I 0.403 (*)     All other components within normal limits   URINALYSIS MICROSCOPIC - Abnormal; Notable for the following components:    RBC, UA 11 (*)     WBC, UA >100 (*)     WBC Clumps, UA Many (*)     All other components within normal limits    Narrative:     Specimen Source->Urine   LIPASE   APTT    Narrative:     Draw baseline aPTT prior to starting the heparin bolus or  infusion  (if patient is on warfarin prior to heparin therapy)   PROTIME-INR    Narrative:     Draw baseline aPTT prior to starting the heparin bolus or  infusion  (if patient is on warfarin prior to heparin therapy)   SARS-COV-2 RNA AMPLIFICATION, QUAL   LACTIC ACID, PLASMA        All Lab Results:  Results for orders placed or performed during the hospital encounter of 08/19/22   Blood culture    Specimen: Blood   Result Value Ref Range    Blood Culture, Routine No growth after 5 days.    Blood culture    Specimen: Blood   Result Value Ref Range    Blood Culture, Routine No growth after 5 days.    Urine culture    Specimen: Urine   Result Value Ref Range    Urine Culture, Routine (A)      STAPHYLOCOCCUS AUREUS  >100,000cfu/ml  No other significant isolate         Susceptibility    Staphylococcus aureus - CULTURE, URINE     Oxacillin <=0.25 Sensitive mcg/mL     Penicillin >8 Resistant mcg/mL     Trimeth/Sulfa <=0.5/9.5 Sensitive  mcg/mL   Aerobic culture    Specimen: Foot, Right; Abscess   Result Value Ref Range    Aerobic Bacterial Culture STAPHYLOCOCCUS AUREUS  Rare   (A)        Susceptibility    Staphylococcus aureus - CULTURE, AEROBIC  (SPECIFY SOURCE)     Clindamycin <=0.5 Sensitive mcg/mL     Erythromycin >4 Resistant mcg/mL     Oxacillin 0.5 Sensitive mcg/mL     Penicillin >8 Resistant mcg/mL     Trimeth/Sulfa <=0.5/9.5 Sensitive mcg/mL     Tetracycline <=4 Sensitive mcg/mL   CBC W/ AUTO DIFFERENTIAL   Result Value Ref Range    WBC 25.94 (H) 3.90 - 12.70 K/uL    RBC 5.29 4.00 - 5.40 M/uL    Hemoglobin 15.3 12.0 - 16.0 g/dL    Hematocrit 46.7 37.0 - 48.5 %    MCV 88 82 - 98 fL    MCH 28.9 27.0 - 31.0 pg    MCHC 32.8 32.0 - 36.0 g/dL    RDW 14.9 (H) 11.5 - 14.5 %    Platelets 209 150 - 450 K/uL    MPV 9.2 9.2 - 12.9 fL    Immature Granulocytes 1.1 (H) 0.0 - 0.5 %    Gran # (ANC) 23.5 (H) 1.8 - 7.7 K/uL    Immature Grans (Abs) 0.29 (H) 0.00 - 0.04 K/uL    Lymph # 0.6 (L) 1.0 - 4.8 K/uL    Mono # 1.4 (H) 0.3 - 1.0 K/uL    Eos # 0.0 0.0 - 0.5 K/uL    Baso # 0.12 0.00 - 0.20 K/uL    nRBC 0 0 /100 WBC    Gran % 90.6 (H) 38.0 - 73.0 %    Lymph % 2.2 (L) 18.0 - 48.0 %    Mono % 5.5 4.0 - 15.0 %    Eosinophil % 0.1 0.0 - 8.0 %    Basophil % 0.5 0.0 - 1.9 %    Differential Method Automated    Comp. Metabolic Panel   Result Value Ref Range    Sodium 140 136 - 145 mmol/L    Potassium 3.4 (L) 3.5 - 5.1 mmol/L    Chloride 106 95 - 110 mmol/L    CO2 17 (L) 23 - 29 mmol/L    Glucose 119 (H) 70 - 110 mg/dL    BUN 29 (H) 8 - 23 mg/dL    Creatinine 1.2 0.5 - 1.4 mg/dL    Calcium 9.6 8.7 - 10.5 mg/dL    Total Protein 7.8 6.0 - 8.4 g/dL    Albumin 3.7 3.5 - 5.2 g/dL    Total Bilirubin 0.7 0.1 - 1.0 mg/dL    Alkaline Phosphatase 115 55 - 135 U/L    AST 53 (H) 10 - 40 U/L    ALT 33 10 - 44 U/L    Anion Gap 17 (H) 8 - 16 mmol/L    eGFR 43 (A) >60 mL/min/1.73 m^2   Lipase   Result Value Ref Range    Lipase 16 4 - 60 U/L   Urinalysis, Reflex to Urine Culture  Urine, Clean Catch    Specimen: Urine   Result Value Ref Range    Specimen UA Urine, Clean Catch     Color, UA Yellow Yellow, Straw, Deepika    Appearance, UA Hazy (A) Clear    pH, UA 6.0 5.0 - 8.0    Specific Gravity, UA 1.020 1.005 - 1.030    Protein, UA 1+ (A) Negative    Glucose, UA Negative Negative    Ketones, UA Negative Negative    Bilirubin (UA) Negative Negative    Occult Blood UA 2+ (A) Negative    Nitrite, UA Negative Negative    Urobilinogen, UA Negative <2.0 EU/dL    Leukocytes, UA 3+ (A) Negative   Troponin I #1   Result Value Ref Range    Troponin I 0.317 (H) 0.000 - 0.026 ng/mL   BNP   Result Value Ref Range     (H) 0 - 99 pg/mL   Troponin I #2   Result Value Ref Range    Troponin I 0.403 (H) 0.000 - 0.026 ng/mL   APTT   Result Value Ref Range    aPTT 31.2 21.0 - 32.0 sec   Protime-INR   Result Value Ref Range    Prothrombin Time 11.4 9.0 - 12.5 sec    INR 1.1 0.8 - 1.2   COVID-19 Rapid Screening   Result Value Ref Range    SARS-CoV-2 RNA, Amplification, Qual Negative Negative   Lactic acid, plasma   Result Value Ref Range    Lactate (Lactic Acid) 1.8 0.5 - 2.2 mmol/L   Urinalysis Microscopic   Result Value Ref Range    RBC, UA 11 (H) 0 - 4 /hpf    WBC, UA >100 (H) 0 - 5 /hpf    WBC Clumps, UA Many (A) None-Rare    Bacteria Rare None-Occ /hpf    Squam Epithel, UA 6 /hpf    Hyaline Casts, UA 0 0-1/lpf /lpf    Microscopic Comment SEE COMMENT    Sedimentation rate   Result Value Ref Range    Sed Rate 18 0 - 20 mm/Hr   C-reactive protein   Result Value Ref Range    .8 (H) 0.0 - 8.2 mg/L   CBC auto differential   Result Value Ref Range    WBC 23.71 (H) 3.90 - 12.70 K/uL    RBC 5.30 4.00 - 5.40 M/uL    Hemoglobin 14.9 12.0 - 16.0 g/dL    Hematocrit 48.6 (H) 37.0 - 48.5 %    MCV 92 82 - 98 fL    MCH 28.1 27.0 - 31.0 pg    MCHC 30.7 (L) 32.0 - 36.0 g/dL    RDW 15.4 (H) 11.5 - 14.5 %    Platelets 251 150 - 450 K/uL    MPV 11.1 9.2 - 12.9 fL    Immature Granulocytes 0.5 0.0 - 0.5 %    Gran # (ANC)  21.6 (H) 1.8 - 7.7 K/uL    Immature Grans (Abs) 0.13 (H) 0.00 - 0.04 K/uL    Lymph # 1.0 1.0 - 4.8 K/uL    Mono # 0.9 0.3 - 1.0 K/uL    Eos # 0.0 0.0 - 0.5 K/uL    Baso # 0.10 0.00 - 0.20 K/uL    nRBC 0 0 /100 WBC    Gran % 91.1 (H) 38.0 - 73.0 %    Lymph % 4.3 (L) 18.0 - 48.0 %    Mono % 3.6 (L) 4.0 - 15.0 %    Eosinophil % 0.1 0.0 - 8.0 %    Basophil % 0.4 0.0 - 1.9 %    Differential Method Automated    Troponin I   Result Value Ref Range    Troponin I 0.360 (H) 0.000 - 0.026 ng/mL   APTT   Result Value Ref Range    aPTT 51.8 (H) 21.0 - 32.0 sec   Comprehensive metabolic panel   Result Value Ref Range    Sodium 142 136 - 145 mmol/L    Potassium 4.3 3.5 - 5.1 mmol/L    Chloride 104 95 - 110 mmol/L    CO2 22 (L) 23 - 29 mmol/L    Glucose 98 70 - 110 mg/dL    BUN 33 (H) 8 - 23 mg/dL    Creatinine 1.3 0.5 - 1.4 mg/dL    Calcium 9.2 8.7 - 10.5 mg/dL    Total Protein 7.1 6.0 - 8.4 g/dL    Albumin 3.6 3.5 - 5.2 g/dL    Total Bilirubin 0.9 0.1 - 1.0 mg/dL    Alkaline Phosphatase 105 55 - 135 U/L    AST 46 (H) 10 - 40 U/L    ALT 34 10 - 44 U/L    Anion Gap 16 8 - 16 mmol/L    eGFR 39 (A) >60 mL/min/1.73 m^2   APTT   Result Value Ref Range    aPTT 41.3 (H) 21.0 - 32.0 sec   Vancomycin, random   Result Value Ref Range    Vancomycin, Random 12.3 Not established ug/mL   Creatinine, serum   Result Value Ref Range    Creatinine 1.1 0.5 - 1.4 mg/dL    eGFR 48 (A) >60 mL/min/1.73 m^2   Vancomycin, random   Result Value Ref Range    Vancomycin, Random 13.6 Not established ug/mL   Creatinine, Serum   Result Value Ref Range    Creatinine 1.0 0.5 - 1.4 mg/dL    eGFR 54 (A) >60 mL/min/1.73 m^2   Echo Saline Bubble? No   Result Value Ref Range    BSA 1.73 m2    TDI SEPTAL 0.04 m/s    LV LATERAL E/E' RATIO 18.60 m/s    LV SEPTAL E/E' RATIO 23.25 m/s    LA WIDTH 3.80 cm    IVC diameter 1.54 cm    Left Ventricular Outflow Tract Mean Velocity 0.83 cm/s    Left Ventricular Outflow Tract Mean Gradient 2.79 mmHg    TV mean gradient 19 mmHg     TDI LATERAL 0.05 m/s    LVIDd 3.31 (A) 3.5 - 6.0 cm    IVS 1.69 (A) 0.6 - 1.1 cm    Posterior Wall 1.50 (A) 0.6 - 1.1 cm    Ao root annulus 2.88 cm    LVIDs 2.41 2.1 - 4.0 cm    FS 27 28 - 44 %    LA volume 54.54 cm3    STJ 2.74 cm    Ascending aorta 2.64 cm    LV mass 198.92 g    LA size 3.50 cm    TAPSE 1.80 cm    Left Ventricle Relative Wall Thickness 0.91 cm    AV mean gradient 5 mmHg    AV valve area 2.03 cm2    AV Velocity Ratio 0.69     AV index (prosthetic) 0.72     MV valve area p 1/2 method 2.37 cm2    E/A ratio 0.75     Mean e' 0.05 m/s    E wave deceleration time 320.37 msec    IVRT 60.89 msec    LVOT diameter 1.90 cm    LVOT area 2.8 cm2    LVOT peak pierce 0.97 m/s    LVOT peak VTI 19.90 cm    Ao peak pierec 1.40 m/s    Ao VTI 27.8 cm    RVOT peak pierce 0.60 m/s    RVOT peak VTI 12.4 cm    Mr max pierce 3.37 m/s    LVOT stroke volume 56.39 cm3    AV peak gradient 8 mmHg    PV mean gradient 0.64 mmHg    E/E' ratio 20.67 m/s    MV Peak E Pierce 0.93 m/s    TR Max Pierce 2.34 m/s    MV stenosis pressure 1/2 time 92.91 ms    MV Peak A Pierce 1.24 m/s    LV Systolic Volume 20.42 mL    LV Systolic Volume Index 12.1 mL/m2    LV Diastolic Volume 44.58 mL    LV Diastolic Volume Index 26.38 mL/m2    LA Volume Index 32.3 mL/m2    LV Mass Index 118 g/m2    RA Major Axis 3.84 cm    Left Atrium Minor Axis 4.76 cm    Left Atrium Major Axis 4.89 cm    Triscuspid Valve Regurgitation Peak Gradient 22 mmHg    RA Width 2.90 cm    Right Atrial Pressure (from IVC) 3 mmHg    EF 55 %    TV rest pulmonary artery pressure 25 mmHg         Imaging Results:  Imaging Results              X-Ray Foot 2 View Left (Final result)  Result time 08/20/22 08:16:48      Final result by Terry Fontenot MD (08/20/22 08:16:48)                   Impression:      See above.      Electronically signed by: Terry Fontenot MD  Date:    08/20/2022  Time:    08:16               Narrative:    EXAMINATION:  XR FOOT 2 VIEW LEFT    CLINICAL HISTORY:  Pain and swelling  left 2nd toe., 2nd digit infection, r/o osteo;    TECHNIQUE:  Standard radiography performed.    COMPARISON:  None    FINDINGS:  Limited two views of the left foot.  Demineralization/osteopenia.  Prominent hallux valgus deformity of the 1st metatarsophalangeal joint with bony bunion formation.  Possible disruption of the nail of the 1st toe    Soft tissue swelling with soft tissue gas of the 2nd toe consistent with cellulitis.  No definite bone destruction.  No definite osteomyelitis.                                       X-Ray Chest AP Portable (Final result)  Result time 08/19/22 21:44:17      Final result by Ady Gomez MD (08/19/22 21:44:17)                   Impression:      No acute abnormality.      Electronically signed by: Jason Garsia  Date:    08/19/2022  Time:    21:44               Narrative:    EXAMINATION:  XR CHEST AP PORTABLE    CLINICAL HISTORY:  abd pain;    TECHNIQUE:  Single frontal view of the chest was performed.    COMPARISON:  None    FINDINGS:  Left chest pacemaker.  Mild interstitial prominence.The lungs are clear, with normal appearance of pulmonary vasculature and no pleural effusion or pneumothorax.    The cardiac silhouette is normal in size. The hilar and mediastinal contours are unremarkable.    Bones are intact.                                       CT Abdomen Pelvis  Without Contrast (Final result)  Result time 08/19/22 21:28:23      Final result by Ady Gomez MD (08/19/22 21:28:23)                   Impression:      16 x 16 mm peripheral low attending partially calcified lesion in the left mid abdomen may relate to epiploic appendagitis of uncertain clinical significance. Correlate to site of pain. See coronal image 45, series 601    Right anterior lung base pulmonary nodules measuring up to 9 mm.    Atherosclerotic changes    Small hiatal hernia; renal cyst; focal splenic calcification, colonic diverticulosis    No evidence for abscess    All CT scans   are performed  using dose optimization techniques including the following: automated exposure control; adjustment of the mA and/or kV; use of iterative reconstruction technique.  Dose modulation was employed for ALARA by means of: Automated exposure control; adjustment of the mA and/or kV according to patient size (this includes techniques or standardized protocols for targeted exams where dose is matched to indication/reason for exam; i.e. extremities or head); and/or use of iterative reconstructive technique.      Electronically signed by: Jason Garsia  Date:    08/19/2022  Time:    21:28               Narrative:    EXAMINATION:  CT ABDOMEN PELVIS WITHOUT CONTRAST    CLINICAL HISTORY:  Abdominal abscess/infection suspected;    TECHNIQUE:  Low dose axial images, sagittal and coronal reformations were obtained from the lung bases to the pubic symphysis, 30 mL of oral Omnipaque 350 was administered..    COMPARISON:  None    FINDINGS:  Heart: Pacemaker lead.  No pericardial effusion as far seen.    Lung Bases: Right anterior lung base pulmonary nodules measuring up to 9 mm.  Mild subsegmental atelectasis.    Liver: Hepatomegaly with no focal hepatic lesions.    Gallbladder: No calcified gallstones.  Slight hyperdensity may relate to sludge    Bile Ducts: No evidence of dilated ducts.    Pancreas: No mass or peripancreatic fat stranding.    Spleen: Focal splenic calcification.    Adrenals: Unremarkable.    Kidneys/ Ureters: Left renal cyst measuring 2.7 x 2.1 cm.    Bladder: No evidence of wall thickening.    Reproductive organs: Status post hysterectomy    GI Tract/Mesentery: No evidence of bowel obstruction or inflammation. No secondary signs of appendicitis.  Colonic diverticulosis.  Hiatal hernia    Peritoneal Space: No ascites. No free air.  16 x 16 mm peripheral low attending partially calcified lesion in the left mid abdomen may relate to epiploic appendagitis of uncertain clinical significance.  Correlate to site of pain.   See coronal image 45, series 601    Retroperitoneum: No significant adenopathy.    Abdominal wall: Fat containing umbilical hernia.    Vasculature: No aneurysm.    Bones: No acute fracture.                                       The EKG was ordered, reviewed, and independently interpreted by the ED provider.  Interpretation time: 21:27  Rate: 97 BPM  Rhythm: Sinus rhythm with marked sinus arrhythmia  Interpretation: Left bundle branch block. No STEMI.           The Emergency Provider reviewed the vital signs and test results, which are outlined above.     ED Discussion       11:10 PM: Discussed pt's case with Dr. Vivar (Cardiology) who recommends to continue home medications and echo.    11:20 PM: Discussed case with Urmila Beyer NP (Hospital Medicine). Dr. Hobbs agrees with current care and management of pt and accepts admission.   Admitting Service: Hospital Medicine  Admitting Physician: Dr. Hobbs  Admit to: Inpatient tele    11:21 PM: Re-evaluated pt. I have discussed test results, shared treatment plan, and the need for admission with patient and family at bedside. Pt and family express understanding at this time and agree with all information. All questions answered. Pt and family have no further questions or concerns at this time. Pt is ready for admit.       Medical Decision Making:   Clinical Tests:   Lab Tests: Ordered and Reviewed  Radiological Study: Ordered and Reviewed  Medical Tests: Ordered and Reviewed         ED Medication(s):  Medications   ondansetron injection 4 mg (4 mg Intravenous Given 8/19/22 2140)   ketorolac injection 15 mg (15 mg Intravenous Given 8/19/22 2141)   heparin 25,000 units in dextrose 5% (100 units/ml) IV bolus from bag INITIAL BOLUS (max bolus 4000 units) (3,410 Units Intravenous Bolus from Bag 8/20/22 0222)   potassium chloride SA CR tablet 40 mEq (40 mEq Oral Given 8/20/22 0202)   vancomycin 1.5 g in dextrose 5 % 250 mL IVPB (ready to mix) (0 mg Intravenous Stopped 8/20/22 0752)    vancomycin 750 mg in dextrose 5 % 250 mL IVPB (ready to mix system) (0 mg Intravenous Stopped 8/21/22 0951)   vancomycin in dextrose 5 % 1 gram/250 mL IVPB 1,000 mg (0 mg Intravenous Stopped 8/22/22 1207)       Discharge Medication List as of 8/22/2022  4:16 PM        START taking these medications    Details   cefepime in dextrose 5 % (MAXIPIME) 2 gram/50 mL PgBk Inject 50 mLs (2 g total) into the vein once daily., Starting Mon 8/22/2022, No Print              Follow-up Information       Bruno Nugent MD. Schedule an appointment as soon as possible for a visit in 7 day(s).    Specialty: Internal Medicine  Why: hospital follow up  Contact information:  [address] [facility] [address] [phone]               Waldemar Hernandez MD. Schedule an appointment as soon as possible for a visit in 1 month(s).    Specialties: Infectious Diseases, Hospitalist  Why: hospital follow up  Contact information:  [address] [place] [address] [place]  [place] [address]  [phone]               Meri Daly DPM. Schedule an appointment as soon as possible for a visit in 1 week(s).    Specialty: Podiatry  Why: hospital follow up  Contact information:  [address] [place] [address] [address] [place] [address]  [phone]               [facility] Follow up.    Specialty: Home Health Services  Why: Home Health: Agency will call and schedule the first visit  Contact information:  [address] [facility] [address]  [place] [address]  [phone]                                 Scribe Attestation:   Scribe #1: I performed the above scribed service and the documentation accurately describes the services I performed. I attest to the accuracy of the note.     Attending:   Physician Attestation Statement for Scribe #1: I, Alber Carr Jr., MD, personally performed the services described in this documentation, as scribed by Doug Lynch, in my presence, and it is both accurate and complete.           Clinical  Impression       ICD-10-CM ICD-9-CM   1. Other acute osteomyelitis of right foot  M86.171 730.07   2. Abdominal pain  R10.9 789.00   3. NSTEMI (non-ST elevated myocardial infarction)  I21.4 410.70   4. CHF (congestive heart failure)  I50.9 428.0   5. Osteomyelitis of right foot, unspecified type  M86.9 730.27   6. Chronic ischemic heart disease  I25.9 414.9       Disposition:   Disposition: Admitted  Condition: Fair       Alber Carr Jr., MD  08/20/22 0122       Alber Carr Jr., MD  09/09/22 1114

## 2022-08-20 NOTE — CONSULTS
[facility] Tele[facility] ([facility])  Podiatry  Consult Note    Patient Name: Marysol Yancey  MRN: [id number]  Admission Date: 8/19/2022  Hospital Length of Stay: 1 days  Attending Physician: Eliseo Spence MD  Primary Care Provider: Bruno Nugent MD     Inpatient consult to Podiatry  Consult performed by: Meri Daly DPM  Consult ordered by: Hue Simon NP  Reason for consult: left 2nd toe wound, right forefoot local abscess        Subjective:     History of Present Illness:  A 89-year-old female seen today with her daughter present with past medical history MI, CHF, defibrillator implant who sustained chest pain and abdominal pain inspiration.  Reports nausea and vomiting as well.  She was seen in hospital by Hospital Medicine.  Cardiology was consulted heparin drip started.     She reports that she was previously having a wound present to the plantar aspect of the right forefoot for proximally 2-3 months which had healed.  She relates that she subsequently developed a blister to the dorsal aspect of the right foot which has been ongoing for a similar amount of time.  She attempted to remove blister to the dorsal aspect the right foot several days ago.  She also sustained injury to her left 2nd toe 2 days ago.  She hit her toe against furniture object she ultimately resulted in some bleeding.       Scheduled Meds:   sodium chloride 0.9%   Intravenous Once    aspirin  81 mg Oral Daily    carvediloL  6.25 mg Oral BID WM    ceFEPime (MAXIPIME) IVPB  2 g Intravenous Q24H    clonazePAM  0.5 mg Oral QHS    FLUoxetine  20 mg Oral Daily    levothyroxine  75 mcg Oral Before breakfast     Continuous Infusions:   heparin (porcine) in D5W 12 Units/kg/hr (08/20/22 0221)     PRN Meds:acetaminophen, heparin (PORCINE), heparin (PORCINE), hydrALAZINE, ondansetron, Pharmacy to dose Vancomycin consult **AND** vancomycin - pharmacy to dose    Review of patient's allergies indicates:   Allergen Reactions    Zetia  [ezetimibe]     Atorvastatin     Avelox [moxifloxacin]     Erythromycin     Lisinopril     Norvasc [amlodipine]     Pcn [penicillins]     Statins-hmg-coa reductase inhibitors         Past Medical History:   Diagnosis Date    Abnormal ECG 12/17/2014    AICD (automatic cardioverter/defibrillator) present 6/14/2013    CAD (coronary artery disease) 6/14/2013    Cancer     basil cell carcinoma    Cardiac arrest 6/14/2013    CHF (congestive heart failure)     CRI (chronic renal insufficiency) 6/14/2013    Depression 6/14/2013    Diabetes mellitus     Fatigue 6/14/2013    Heart block     Hyperlipidemia     Hypertension     LBBB (left bundle branch block) 12/17/2014    Mitral regurgitation 6/14/2013    Mixed hyperlipidemia 6/14/2013    Pulmonary nodule 6/14/2013    Thyroid disease     TR (tricuspid regurgitation) 6/14/2013    Ventricular fibrillation 6/14/2013     No past surgical history on file.    Family History       Problem Relation (Age of Onset)    Heart attack Father    Hyperlipidemia Father    Hypertension Father, Daughter          Tobacco Use    Smoking status: Never Smoker    Smokeless tobacco: Never Used   Substance and Sexual Activity    Alcohol use: Yes     Alcohol/week: 7.0 standard drinks     Types: 7 Glasses of wine per week    Drug use: No    Sexual activity: Not on file     Review of Systems   Constitutional:  Negative for activity change, appetite change, chills and fever.   Respiratory:  Positive for shortness of breath (Currently on oxygen).    Cardiovascular:  Negative for leg swelling.   Gastrointestinal:  Positive for abdominal pain, nausea and vomiting (Caused by anxiety).   Skin:  Positive for color change and wound.   Neurological:  Positive for weakness and numbness. Negative for facial asymmetry.   Psychiatric/Behavioral:  Negative for decreased concentration and dysphoric mood. The patient is nervous/anxious.    Objective:     Vital Signs (Most Recent):  Temp:  98.9 °F (37.2 °C) (08/20/22 0800)  Pulse: 84 (08/20/22 0800)  Resp: 20 (08/20/22 0800)  BP: (!) 164/77 (08/20/22 0913)  SpO2: (!) 94 % (08/20/22 0800)   Vital Signs (24h Range):  Temp:  [98.2 °F (36.8 °C)-98.9 °F (37.2 °C)] 98.9 °F (37.2 °C)  Pulse:  [] 84  Resp:  [18-34] 20  SpO2:  [94 %-97 %] 94 %  BP: (142-181)/() 164/77     Weight: 69.9 kg (154 lb)  Body mass index is 29.1 kg/m².    Foot Exam    CONSTITUTIONAL:  Patient is awake, alert, and oriented to person, place, and time.  Patient is well groomed with normal appearance.  Seen with daughter present    EYES:  Pupils are equal and reactive to light.  No icterus    ENT:  Mucous membranes are moist.  Dentition intact.    PULMONARY:  Oxygen in place via nasal canula    VASCULAR:  The right dorsalis pedis pulse 1/4 and the right posterior tibial pulse 1/4.  The left dorsalis pedis pulse 1/4 and posterior tibial pulse on the left is 1/4.  Capillary refill is intact.  Pedal hair growth decreased.     NEUROLOGICAL:  Protective sensation is not intact to the right left foot.  Vibratory sensation is diminished.  Proprioception is intact.  Sharp/dull is reduced.    DERMATOLOGICAL:    Stable wound present to the distal aspect of the left 2nd toe with circumferential erythema.  No probe to bone.  No underlying purulence or drainage.    Dorsal blister present with underlying mixed serous and purulent drainage.  There is small wound present to the medial aspect of the blister formation she does have a small amount of purulence expressed.  Area was flushed with copious amounts normal saline until running clear.  Healed fissure on the plantar aspect of the right foot    MUSCULOSKELETAL:Right hallux abductovalgus with crossover 2nd toe.        Laboratory:  A1C: No results for input(s): HGBA1C in the last 4320 hours.  Blood Cultures: No results for input(s): LABBLOO in the last 48 hours.  Cardiac Markers: No results for input(s): CKMB, TROPONINT, MYOGLOBIN in the  "last 168 hours.  CBC:   Recent Labs   Lab 08/20/22 0510   WBC 23.71*   RBC 5.30   HGB 14.9   HCT 48.6*      MCV 92   MCH 28.1   MCHC 30.7*     CMP:   Recent Labs   Lab 08/20/22 0841   GLU 98   CALCIUM 9.2   ALBUMIN 3.6   PROT 7.1      K 4.3   CO2 22*      BUN 33*   CREATININE 1.3   ALKPHOS 105   ALT 34   AST 46*   BILITOT 0.9     CRP:   Recent Labs   Lab 08/20/22 0510   .8*     ESR:   Recent Labs   Lab 08/20/22 0511   SEDRATE 18     Wound Cultures: No results for input(s): LABAERO in the last 4320 hours.  Microbiology Results (last 7 days)       Procedure Component Value Units Date/Time    Aerobic culture [[id number]]     Order Status: No result Specimen: Abscess from Foot, Right     Blood culture [[id number]] Collected: 08/20/22 0511    Order Status: Sent Specimen: Blood Updated: 08/20/22 0511    Blood culture [[id number]] Collected: 08/20/22 0511    Order Status: Sent Specimen: Blood Updated: 08/20/22 0511    Urine culture [[id number]] Collected: 08/20/22 0035    Order Status: No result Specimen: Urine Updated: 08/20/22 0051            Diagnostic Results:  I have reviewed all pertinent imaging results/findings within the past 24 hours.    Cellulitis of the left 2nd toe with stable overlying eschar to the nail bed.  Source of infection likely the right foot a small abscess was noted to the forefoot which was drained at bedside.  Cultures taken for microbiology    Assessment/Plan:     * NSTEMI (non-ST elevated myocardial infarction)  Cardiology following    Foot infection  Right foot was likely source of infection or contributing source of infection.  Cultures taken.  Sensitivities to follow for antibiotic selection. Discussed I&D at bedside due to hx of peripheral neuropathy, both patient and daughter were amendable. Small amount of purulence noted under the blister. Will continue antibiotics.    Discussed the left 2nd toe in detail.   Xray report shows "No definite bone destruction. NO " "definite osteomyelitis".  Patient has normal esr and elevated crp, this is likely an  acute condition which is likely not associated with an osteomyelitic process but rather a local abscess. There is no concern for gas gangrene of the left 2nd toe. Wound is stable without crepitus or fluctuance.  Discussed treatment options with patient and daughter concerning testing/definitively ruling out osteomyelitis would to be take a bone biopsy of the distal phalanx, which would result in amputation for the sample. Patient and daughter would like to avoid amputation.    Recommend daily dressing changes and local saline flush to express purulent material to the right foot with dry dressings and betadine paint to the left 2nd toe. Patient and daughter would like to proceed with conservative cares to the foot at this point.     Severe sepsis  Elevated white count.  Suspect source of infection was the right foot abscess which was noted underlying the blister.  Cellulitis present to the left 2nd toe.  No signs or acute signs of osteomyelitis.  Continue antibiotics per Hospital Medicine.  Culture was taken of the right foot abscess      CAD (coronary artery disease)  Cardiology following        Thank you for your consult.     Patient is OKAY to eat per foot and ankle service as long as other services are okay as well.    Meri Daly DPM  Podiatry  [facility] - Telemetry (Hospital)  "

## 2022-08-20 NOTE — H&P
[facility] Emergency Dept[facility]  [facility] Medicine  History & Physical    Patient Name: Marysol Yancey  MRN: [id number]  Patient Class: IP- Inpatient  Admission Date: 8/19/2022  Attending Physician: Jitendra Hobbs MD  Primary Care Provider: Bruno Nugent MD         Patient information was obtained from patient and ER records.     Subjective:     Principal Problem:NSTEMI (non-ST elevated myocardial infarction)    Chief Complaint:   Chief Complaint   Patient presents with    Shortness of Breath     Shortness of breath since panic attack at noon today, now c/o CP on inhalation radiating to left shoulder, BBS = Bilat per [facility], O2 92% RA        HPI: Patient is a 89 y.o.  female with a PMHx of MI, CHF, HLD, and defibrillator implant in L chest (installed in 2013) who presents to the Emergency Department for evaluation of R-side abdominal pain. Patient reported symptoms occurring earlier today. Abd pain is exacerbated by deep inspiration. Reports having nausea and other associated symptoms include dizziness, sob, and tremors. States last bowel movement was earlier today. Denies any other issues.     In the ED, labs concerning for troponin 0.317, , WBC: 25.9. CT abd did not reveal source of infection or abdominal pain. Chest xray unremarkable. Cards consulted and heparin started. HM consulted and patient admitted.       Past Medical History:   Diagnosis Date    Abnormal ECG 12/17/2014    AICD (automatic cardioverter/defibrillator) present 6/14/2013    CAD (coronary artery disease) 6/14/2013    Cancer     basil cell carcinoma    Cardiac arrest 6/14/2013    CHF (congestive heart failure)     CRI (chronic renal insufficiency) 6/14/2013    Depression 6/14/2013    Diabetes mellitus     Fatigue 6/14/2013    Heart block     Hyperlipidemia     Hypertension     LBBB (left bundle branch block) 12/17/2014    Mitral regurgitation 6/14/2013    Mixed hyperlipidemia 6/14/2013    Pulmonary nodule 6/14/2013    Thyroid  disease     TR (tricuspid regurgitation) 6/14/2013    Ventricular fibrillation 6/14/2013       No past surgical history on file.    Review of patient's allergies indicates:   Allergen Reactions    Zetia [ezetimibe]     Atorvastatin     Avelox [moxifloxacin]     Erythromycin     Lisinopril     Norvasc [amlodipine]     Pcn [penicillins]     Statins-hmg-coa reductase inhibitors        No current facility-administered medications on file prior to encounter.     Current Outpatient Medications on File Prior to Encounter   Medication Sig    aspirin (ECOTRIN) 81 MG EC tablet Take 81 mg by mouth once daily.    carvedilol (COREG) 6.25 MG tablet Take 1 tablet (6.25 mg total) by mouth 2 (two) times daily with meals.    clonazePAM (KLONOPIN) 0.5 MG tablet Take 0.5 mg by mouth every evening.    cloNIDine (CATAPRES) 0.2 MG tablet Take 1 tablet by mouth every evening.    fluoxetine (PROZAC) 20 MG tablet Take 20 mg by mouth once daily.    levothyroxine (SYNTHROID) 75 MCG tablet Take 75 mcg by mouth once daily.    naproxen sodium (ALEVE) 220 mg Cap Take 1 tablet by mouth every evening. Aleve PM    omega-3 fatty acids (FISH OIL CONCENTRATE ORAL) Take 1,000 mg by mouth once daily.    triamterene-hydrochlorothiazide 37.5-25 mg (MAXZIDE-25) 37.5-25 mg per tablet Take 0.5 tablets by mouth once daily.     Family History       Problem Relation (Age of Onset)    Heart attack Father    Hyperlipidemia Father    Hypertension Father, Daughter          Tobacco Use    Smoking status: Never Smoker    Smokeless tobacco: Never Used   Substance and Sexual Activity    Alcohol use: Yes     Alcohol/week: 7.0 standard drinks     Types: 7 Glasses of wine per week    Drug use: No    Sexual activity: Not on file     Review of Systems   Constitutional:  Negative for fatigue and fever.   HENT:  Negative for sinus pressure.    Eyes:  Negative for visual disturbance.   Respiratory:  Negative for shortness of breath.    Cardiovascular:   Negative for chest pain.   Gastrointestinal:  Positive for abdominal pain, nausea and vomiting.   Genitourinary:  Negative for difficulty urinating.   Musculoskeletal:  Negative for back pain.   Skin:  Negative for rash.   Neurological:  Negative for headaches.   Psychiatric/Behavioral:  Negative for confusion.    Objective:     Vital Signs (Most Recent):  Temp: 98.9 °F (37.2 °C) (08/19/22 1957)  Pulse: 87 (08/19/22 2329)  Resp: 20 (08/19/22 2246)  BP: (!) 160/72 (08/19/22 2329)  SpO2: 97 % (08/19/22 2207)   Vital Signs (24h Range):  Temp:  [98.9 °F (37.2 °C)] 98.9 °F (37.2 °C)  Pulse:  [] 87  Resp:  [20-34] 20  SpO2:  [94 %-97 %] 97 %  BP: (160-176)/() 160/72     Weight: 70.3 kg (155 lb)  Body mass index is 29.29 kg/m².    Physical Exam  Constitutional:       General: She is not in acute distress.     Appearance: She is well-developed. She is not diaphoretic.   HENT:      Head: Normocephalic and atraumatic.   Eyes:      Pupils: Pupils are equal, round, and reactive to light.   Cardiovascular:      Rate and Rhythm: Normal rate and regular rhythm.      Heart sounds: Normal heart sounds. No murmur heard.    No friction rub. No gallop.      Comments: aicd  Pulmonary:      Effort: Pulmonary effort is normal. No respiratory distress.      Breath sounds: Normal breath sounds. No stridor. No wheezing or rales.   Abdominal:      General: Bowel sounds are normal. There is no distension.      Palpations: Abdomen is soft. There is no mass.      Tenderness: There is no abdominal tenderness. There is no guarding.   Musculoskeletal:      Right lower leg: No edema.      Left lower leg: No edema.   Skin:     General: Skin is warm.      Findings: No erythema.      Comments: Left foot wound on dorsum, purulence expressed; right 2nd digit toe discoloration   Neurological:      Mental Status: She is alert and oriented to person, place, and time.         CRANIAL NERVES     CN III, IV, VI   Pupils are equal, round, and  reactive to light.     Significant Labs:   Results for orders placed or performed during the hospital encounter of 08/19/22   CBC W/ AUTO DIFFERENTIAL   Result Value Ref Range    WBC 25.94 (H) 3.90 - 12.70 K/uL    RBC 5.29 4.00 - 5.40 M/uL    Hemoglobin 15.3 12.0 - 16.0 g/dL    Hematocrit 46.7 37.0 - 48.5 %    MCV 88 82 - 98 fL    MCH 28.9 27.0 - 31.0 pg    MCHC 32.8 32.0 - 36.0 g/dL    RDW 14.9 (H) 11.5 - 14.5 %    Platelets 209 150 - 450 K/uL    MPV 9.2 9.2 - 12.9 fL    Immature Granulocytes 1.1 (H) 0.0 - 0.5 %    Gran # (ANC) 23.5 (H) 1.8 - 7.7 K/uL    Immature Grans (Abs) 0.29 (H) 0.00 - 0.04 K/uL    Lymph # 0.6 (L) 1.0 - 4.8 K/uL    Mono # 1.4 (H) 0.3 - 1.0 K/uL    Eos # 0.0 0.0 - 0.5 K/uL    Baso # 0.12 0.00 - 0.20 K/uL    nRBC 0 0 /100 WBC    Gran % 90.6 (H) 38.0 - 73.0 %    Lymph % 2.2 (L) 18.0 - 48.0 %    Mono % 5.5 4.0 - 15.0 %    Eosinophil % 0.1 0.0 - 8.0 %    Basophil % 0.5 0.0 - 1.9 %    Differential Method Automated    Comp. Metabolic Panel   Result Value Ref Range    Sodium 140 136 - 145 mmol/L    Potassium 3.4 (L) 3.5 - 5.1 mmol/L    Chloride 106 95 - 110 mmol/L    CO2 17 (L) 23 - 29 mmol/L    Glucose 119 (H) 70 - 110 mg/dL    BUN 29 (H) 8 - 23 mg/dL    Creatinine 1.2 0.5 - 1.4 mg/dL    Calcium 9.6 8.7 - 10.5 mg/dL    Total Protein 7.8 6.0 - 8.4 g/dL    Albumin 3.7 3.5 - 5.2 g/dL    Total Bilirubin 0.7 0.1 - 1.0 mg/dL    Alkaline Phosphatase 115 55 - 135 U/L    AST 53 (H) 10 - 40 U/L    ALT 33 10 - 44 U/L    Anion Gap 17 (H) 8 - 16 mmol/L    eGFR 43 (A) >60 mL/min/1.73 m^2   Lipase   Result Value Ref Range    Lipase 16 4 - 60 U/L   Troponin I #1   Result Value Ref Range    Troponin I 0.317 (H) 0.000 - 0.026 ng/mL   BNP   Result Value Ref Range     (H) 0 - 99 pg/mL   Troponin I #2   Result Value Ref Range    Troponin I 0.403 (H) 0.000 - 0.026 ng/mL   APTT   Result Value Ref Range    aPTT 31.2 21.0 - 32.0 sec   Protime-INR   Result Value Ref Range    Prothrombin Time 11.4 9.0 - 12.5 sec    INR  1.1 0.8 - 1.2   COVID-19 Rapid Screening   Result Value Ref Range    SARS-CoV-2 RNA, Amplification, Qual Negative Negative   Lactic acid, plasma   Result Value Ref Range    Lactate (Lactic Acid) 1.8 0.5 - 2.2 mmol/L        Significant Imaging: X-Ray Chest AP Portable  Narrative: EXAMINATION:  XR CHEST AP PORTABLE    CLINICAL HISTORY:  abd pain;    TECHNIQUE:  Single frontal view of the chest was performed.    COMPARISON:  None    FINDINGS:  Left chest pacemaker.  Mild interstitial prominence.The lungs are clear, with normal appearance of pulmonary vasculature and no pleural effusion or pneumothorax.    The cardiac silhouette is normal in size. The hilar and mediastinal contours are unremarkable.    Bones are intact.  Impression: No acute abnormality.    Electronically signed by: Jason Garsia  Date:    08/19/2022  Time:    21:44  CT Abdomen Pelvis  Without Contrast  Narrative: EXAMINATION:  CT ABDOMEN PELVIS WITHOUT CONTRAST    CLINICAL HISTORY:  Abdominal abscess/infection suspected;    TECHNIQUE:  Low dose axial images, sagittal and coronal reformations were obtained from the lung bases to the pubic symphysis, 30 mL of oral Omnipaque 350 was administered..    COMPARISON:  None    FINDINGS:  Heart: Pacemaker lead.  No pericardial effusion as far seen.    Lung Bases: Right anterior lung base pulmonary nodules measuring up to 9 mm.  Mild subsegmental atelectasis.    Liver: Hepatomegaly with no focal hepatic lesions.    Gallbladder: No calcified gallstones.  Slight hyperdensity may relate to sludge    Bile Ducts: No evidence of dilated ducts.    Pancreas: No mass or peripancreatic fat stranding.    Spleen: Focal splenic calcification.    Adrenals: Unremarkable.    Kidneys/ Ureters: Left renal cyst measuring 2.7 x 2.1 cm.    Bladder: No evidence of wall thickening.    Reproductive organs: Status post hysterectomy    GI Tract/Mesentery: No evidence of bowel obstruction or inflammation. No secondary signs of  appendicitis.  Colonic diverticulosis.  Hiatal hernia    Peritoneal Space: No ascites. No free air.  16 x 16 mm peripheral low attending partially calcified lesion in the left mid abdomen may relate to epiploic appendagitis of uncertain clinical significance.  Correlate to site of pain.  See coronal image 45, series 601    Retroperitoneum: No significant adenopathy.    Abdominal wall: Fat containing umbilical hernia.    Vasculature: No aneurysm.    Bones: No acute fracture.  Impression: 16 x 16 mm peripheral low attending partially calcified lesion in the left mid abdomen may relate to epiploic appendagitis of uncertain clinical significance. Correlate to site of pain. See coronal image 45, series 601    Right anterior lung base pulmonary nodules measuring up to 9 mm.    Atherosclerotic changes    Small hiatal hernia; renal cyst; focal splenic calcification, colonic diverticulosis    No evidence for abscess    All CT scans   are performed using dose optimization techniques including the following: automated exposure control; adjustment of the mA and/or kV; use of iterative reconstruction technique.  Dose modulation was employed for ALARA by means of: Automated exposure control; adjustment of the mA and/or kV according to patient size (this includes techniques or standardized protocols for targeted exams where dose is matched to indication/reason for exam; i.e. extremities or head); and/or use of iterative reconstructive technique.    Electronically signed by: Jason Garsia  Date:    08/19/2022  Time:    21:28      Assessment/Plan:     * NSTEMI (non-ST elevated myocardial infarction)  No chest pain reported   Trend troponin   Echo   Cardiology consulted on case  Heparin drip  Cont npo  Will gently hydrate at 50cc/hr once         Foot infection  Bilateral foot infection noted  Right dorsum - purulence expressed  Will need to cover for MRSA  Left 2nd digit hyperpigmentation  Will obtain xray, may need CT to r/o  osteo  Cont vanc      Severe sepsis  Leukocytosis and tachycardia noted  Chest xray and abd ct unremarkable  Possible source of infection is skin and possibly bone  Will cover empirically with cefepime and vanc  Blood cultures  U/a pending         Hypertension  Resume home meds  Hydralazine prn       CAD (coronary artery disease)  Resume home meds        VTE Risk Mitigation (From admission, onward)         Ordered     heparin 25,000 units in dextrose 5% (100 units/ml) IV bolus from bag - ADDITIONAL PRN BOLUS - 60 units/kg (max bolus 4000 units)  As needed (PRN)        Question:  Heparin Infusion Adjustment (DO NOT MODIFY ANSWER)  Answer:  \\[email or web address]\epic\Images\Pharmacy\HeparinInfusions\heparin LOW INTENSITY nomogram for [facility] [id number].pdf    08/19/22 2301     heparin 25,000 units in dextrose 5% (100 units/ml) IV bolus from bag - ADDITIONAL PRN BOLUS - 30 units/kg (max bolus 4000 units)  As needed (PRN)        Question:  Heparin Infusion Adjustment (DO NOT MODIFY ANSWER)  Answer:  \\[email or web address]\epic\Images\Pharmacy\HeparinInfusions\heparin LOW INTENSITY nomogram for [facility] [id number].pdf    08/19/22 2301     heparin 25,000 units in dextrose 5% (100 units/ml) IV bolus from bag INITIAL BOLUS (max bolus 4000 units)  Once        Question:  Heparin Infusion Adjustment (DO NOT MODIFY ANSWER)  Answer:  \\[email or web address]\epic\Images\Pharmacy\HeparinInfusions\heparin LOW INTENSITY nomogram for [facility] [id number].pdf    08/19/22 2301     heparin 25,000 units in dextrose 5% 250 mL (100 units/mL) infusion LOW INTENSITY nomogram - [facility]  Continuous        Question Answer Comment   Heparin Infusion Adjustment (DO NOT MODIFY ANSWER) \\[email or web address]\epic\Images\Pharmacy\HeparinInfusions\heparin LOW INTENSITY nomogram for [facility] [id number].pdf    Begin at (in units/kg/hr) 12        08/19/22 2301                   Jitendra Hobbs MD  Department of Hospital Medicine   [facility] - Emergency Dept.

## 2022-08-20 NOTE — HPI
Patient is a 89 y.o.  female with a PMHx of MI, CHF, HLD, and defibrillator implant in L chest (installed in 2013) who presents to the Emergency Department for evaluation of R-side abdominal pain. Patient reported symptoms occurring earlier today. Abd pain is exacerbated by deep inspiration. Reports having nausea and other associated symptoms include dizziness, sob, and tremors. States last bowel movement was earlier today. Denies any other issues.     In the ED, labs concerning for troponin 0.317, , WBC: 25.9. CT abd did not reveal source of infection or abdominal pain. Chest xray unremarkable. Cards consulted and heparin started. HM consulted and patient admitted.

## 2022-08-20 NOTE — ASSESSMENT & PLAN NOTE
Leukocytosis and tachycardia noted  Chest xray and abd ct unremarkable  Possible source of infection is skin and possibly bone  Will cover empirically with cefepime and vanc  Blood cultures  U/a pending

## 2022-08-20 NOTE — PLAN OF CARE
[facility] - Telemetry (Hospital)  Initial Discharge Assessment       Primary Care Provider: Bruno Nugent MD    Admission Diagnosis: CHF (congestive heart failure) [I50.9]  NSTEMI (non-ST elevated myocardial infarction) [I21.4]  Abdominal pain [R10.9]    Admission Date: 8/19/2022  Expected Discharge Date:     Discharge Barriers Identified: None    Payor: MEDICARE / Plan: MEDICARE PART A & B / Product Type: Government /     Extended Emergency Contact Information  Primary Emergency Contact: Marci Cowan   [facility]  Home Phone: [phone]  Relation: Other    Discharge Plan A: Home with family  Discharge Plan B: Home with family      [id number] - [place], [place] - [address]  [address]  [place] [address]  Phone: [phone] Fax: [phone]    [id number] - [place], [place] - [address] [place] [address]  [address] [place] [address]  [place] [address]  Phone: [phone] Fax: [phone]      Initial Assessment (most recent)     Adult Discharge Assessment - 08/20/22 1246        Discharge Assessment    Assessment Type Discharge Planning Assessment     Confirmed/corrected address, phone number and insurance Yes     Confirmed Demographics Correct on Facesheet     Source of Information patient;family     When was your last doctors appointment? --   pt & HCPOA Marci report virtual visit this year    Does patient/caregiver understand observation status Yes     Reason For Admission panic attack     Lives With other relative(s)     Do you expect to return to your current living situation? Yes     Do you have help at home or someone to help you manage your care at home? Yes     Who are your caregiver(s) and their phone number(s)? Prince Buchanan [facility] [phone]     Prior to hospitilization cognitive status: Alert/Oriented     Current cognitive status: Alert/Oriented     Walking or Climbing Stairs Difficulty ambulation difficulty, requires equipment     Mobility  Management Uses walker     Dressing/Bathing Difficulty none     Home Layout Able to live on 1st floor     Equipment Currently Used at Home walker, standard     Readmission within 30 days? No     Patient currently being followed by outpatient case management? No     Do you currently have service(s) that help you manage your care at home? No     Do you take prescription medications? Yes     Do you have prescription coverage? Yes     Coverage Medicare     Do you have any problems affording any of your prescribed medications? No     Is the patient taking medications as prescribed? yes     Who is going to help you get home at discharge? YENNI Cowan     How do you get to doctors appointments? family or friend will provide     Are you on dialysis? No     Do you take coumadin? No     Discharge Plan A Home with family     Discharge Plan B Home with family     DME Needed Upon Discharge  none     Discharge Plan discussed with: POA;Patient     Name(s) and Number(s) madelyn Cowan [phone]     Discharge Barriers Identified None        Relationship/Environment    Name(s) of Who Lives With Patient Prince Sanchez [phone]               Swer met with pt and YENNI Covarrubias at bedside to complete d/c assessment. Pt reports no needs and YENNI will assist with discharge. CM provided a transitional care folder, information on advanced directives, information on pharmacy bedside delivery, and discharge planning begins on admission with contact information for any needs/questions.

## 2022-08-20 NOTE — ASSESSMENT & PLAN NOTE
No chest pain reported   Trend troponin   Echo   Cardiology consulted on case  Heparin drip  Cont npo  Will gently hydrate at 50cc/hr once

## 2022-08-20 NOTE — ASSESSMENT & PLAN NOTE
Bilateral foot infection noted  Right dorsum - purulence expressed  Will need to cover for MRSA  Left 2nd digit hyperpigmentation  Will obtain xray, may need CT to r/o osteo  Cont vanc

## 2022-08-20 NOTE — PLAN OF CARE
Problem: Adult Inpatient Plan of Care  Goal: Plan of Care Review  Outcome: Ongoing, Progressing  Goal: Patient-Specific Goal (Individualized)  Outcome: Ongoing, Progressing  Goal: Absence of Hospital-Acquired Illness or Injury  Outcome: Ongoing, Progressing  Goal: Optimal Comfort and Wellbeing  Outcome: Ongoing, Progressing  Goal: Readiness for Transition of Care  Outcome: Ongoing, Progressing     Problem: Adjustment to Illness (Sepsis/Septic Shock)  Goal: Optimal Coping  Outcome: Ongoing, Progressing     Problem: Bleeding (Sepsis/Septic Shock)  Goal: Absence of Bleeding  Outcome: Ongoing, Progressing     Problem: Glycemic Control Impaired (Sepsis/Septic Shock)  Goal: Blood Glucose Level Within Desired Range  Outcome: Ongoing, Progressing     Problem: Infection Progression (Sepsis/Septic Shock)  Goal: Absence of Infection Signs and Symptoms  Outcome: Ongoing, Progressing     Problem: Nutrition Impaired (Sepsis/Septic Shock)  Goal: Optimal Nutrition Intake  Outcome: Ongoing, Progressing     Problem: Fall Injury Risk  Goal: Absence of Fall and Fall-Related Injury  Outcome: Ongoing, Progressing

## 2022-08-20 NOTE — ASSESSMENT & PLAN NOTE
"Right foot was likely source of infection or contributing source of infection.  Cultures taken.  Sensitivities to follow for antibiotic selection. Discussed I&D at bedside due to hx of peripheral neuropathy, both patient and daughter were amendable. Small amount of purulence noted under the blister. Will continue antibiotics.    Discussed the left 2nd toe in detail.   Xray report shows "No definite bone destruction. NO definite osteomyelitis".  Patient has normal esr and elevated crp, this is likely an  acute condition which is likely not associated with an osteomyelitic process but rather a local abscess. There is no concern for gas gangrene of the left 2nd toe. Wound is stable without crepitus or fluctuance.  Discussed treatment options with patient and daughter concerning testing/definitively ruling out osteomyelitis would to be take a bone biopsy of the distal phalanx, which would result in amputation for the sample. Patient and daughter would like to avoid amputation.    Recommend daily dressing changes and local saline flush to express purulent material to the right foot with dry dressings and betadine paint to the left 2nd toe. Patient and daughter would like to proceed with conservative cares to the foot at this point.   "

## 2022-08-20 NOTE — HPI
A 89-year-old female seen today with her daughter present with past medical history MI, CHF, defibrillator implant who sustained chest pain and abdominal pain inspiration.  Reports nausea and vomiting as well.  She was seen in hospital by Hospital Medicine.  Cardiology was consulted heparin drip started.     She reports that she was previously having a wound present to the plantar aspect of the right forefoot for proximally 2-3 months which had healed.  She relates that she subsequently developed a blister to the dorsal aspect of the right foot which has been ongoing for a similar amount of time.  She attempted to remove blister to the dorsal aspect the right foot several days ago.  She also sustained injury to her left 2nd toe 2 days ago.  She hit her toe against furniture object she ultimately resulted in some bleeding.

## 2022-08-20 NOTE — PROGRESS NOTES
Pharmacokinetic Initial Assessment: IV Vancomycin    Assessment/Plan:    Initiate intravenous vancomycin with loading dose of 1500 mg once with subsequent doses when random concentrations are less than 20 mcg/mL  Desired empiric serum trough concentration is 15 to 20 mcg/mL  Draw vancomycin random level on 08/21 at 0600.  Pharmacy will continue to follow and monitor vancomycin.      Please contact pharmacy at extension [phone] with any questions regarding this assessment.     Thank you for the consult,   Jade Escudero       Patient brief summary:  Marysol Yancey is a 89 y.o. female initiated on antimicrobial therapy with IV Vancomycin for treatment of suspected bone/joint infection    Drug Allergies:   Review of patient's allergies indicates:   Allergen Reactions    Zetia [ezetimibe]     Atorvastatin     Avelox [moxifloxacin]     Erythromycin     Lisinopril     Norvasc [amlodipine]     Pcn [penicillins]     Statins-hmg-coa reductase inhibitors        Actual Body Weight:   70.3 kg    Renal Function:   Estimated Creatinine Clearance: 28.5 mL/min (based on SCr of 1.2 mg/dL).,     Dialysis Method (if applicable):  N/A    CBC (last 72 hours):  Recent Labs   Lab Result Units 08/19/22 2139 08/20/22  0510   WBC K/uL 25.94* 23.71*   Hemoglobin g/dL 15.3 14.9   Hematocrit % 46.7 48.6*   Platelets K/uL 209 251   Gran % % 90.6*  --    Lymph % % 2.2*  --    Mono % % 5.5  --    Eosinophil % % 0.1  --    Basophil % % 0.5  --    Differential Method  Automated  --        Metabolic Panel (last 72 hours):  Recent Labs   Lab Result Units 08/19/22 2139 08/20/22  0035   Sodium mmol/L 140  --    Potassium mmol/L 3.4*  --    Chloride mmol/L 106  --    CO2 mmol/L 17*  --    Glucose mg/dL 119*  --    Glucose, UA   --  Negative   BUN mg/dL 29*  --    Creatinine mg/dL 1.2  --    Albumin g/dL 3.7  --    Total Bilirubin mg/dL 0.7  --    Alkaline Phosphatase U/L 115  --    AST U/L 53*  --    ALT U/L 33  --        Drug levels (last 3 results):  No  results for input(s): VANCOMYCINRA, VANCORANDOM, VANCOMYCINPE, VANCOPEAK, VANCOMYCINTR, VANCOTROUGH in the last 72 hours.    Microbiologic Results:  Microbiology Results (last 7 days)       Procedure Component Value Units Date/Time    Blood culture [[id number]] Collected: 08/20/22 0511    Order Status: Sent Specimen: Blood Updated: 08/20/22 0511    Blood culture [[id number]] Collected: 08/20/22 0511    Order Status: Sent Specimen: Blood Updated: 08/20/22 0511    Urine culture [[id number]] Collected: 08/20/22 0035    Order Status: No result Specimen: Urine Updated: 08/20/22 0051

## 2022-08-20 NOTE — PROGRESS NOTES
Pharmacist Renal Dose Adjustment Note    Marysol Yancey is a 89 y.o. female being treated with the medication cefepime    Patient Data:    Vital Signs (Most Recent):  Temp: 98.9 °F (37.2 °C) (08/19/22 1957)  Pulse: 87 (08/19/22 2329)  Resp: 20 (08/19/22 2246)  BP: (!) 160/72 (08/19/22 2329)  SpO2: 97 % (08/19/22 2207)   Vital Signs (72h Range):  Temp:  [98.9 °F (37.2 °C)]   Pulse:  []   Resp:  [20-34]   BP: (160-176)/()   SpO2:  [94 %-97 %]      Recent Labs   Lab 08/19/22 2139   CREATININE 1.2     Serum creatinine: 1.2 mg/dL 08/19/22 2139  Estimated creatinine clearance: 28.5 mL/min    Cefepime 2 g q12h will be changed to cefepime 2 g q24h for CrCl 11-29 mL/min and severe infection (indication: sepsis of unknown source).     Pharmacist's Name: Jade Escudero  Pharmacist's Extension: [phone]

## 2022-08-20 NOTE — HOSPITAL COURSE
Patient was seen in the emergency room with elevated troponins, white count 25.9, CT was unremarkable for infection.  Chest pain normal.  Hospital Medicine admit the patient and consult Cardiology.  She is currently NPO and underwent echocardiogram with the 55% ejection fraction.  She was started on antibiotics and oxygen

## 2022-08-20 NOTE — SUBJECTIVE & OBJECTIVE
Scheduled Meds:   sodium chloride 0.9%   Intravenous Once    aspirin  81 mg Oral Daily    carvediloL  6.25 mg Oral BID WM    ceFEPime (MAXIPIME) IVPB  2 g Intravenous Q24H    clonazePAM  0.5 mg Oral QHS    FLUoxetine  20 mg Oral Daily    levothyroxine  75 mcg Oral Before breakfast     Continuous Infusions:   heparin (porcine) in D5W 12 Units/kg/hr (08/20/22 0221)     PRN Meds:acetaminophen, heparin (PORCINE), heparin (PORCINE), hydrALAZINE, ondansetron, Pharmacy to dose Vancomycin consult **AND** vancomycin - pharmacy to dose    Review of patient's allergies indicates:   Allergen Reactions    Zetia [ezetimibe]     Atorvastatin     Avelox [moxifloxacin]     Erythromycin     Lisinopril     Norvasc [amlodipine]     Pcn [penicillins]     Statins-hmg-coa reductase inhibitors         Past Medical History:   Diagnosis Date    Abnormal ECG 12/17/2014    AICD (automatic cardioverter/defibrillator) present 6/14/2013    CAD (coronary artery disease) 6/14/2013    Cancer     basil cell carcinoma    Cardiac arrest 6/14/2013    CHF (congestive heart failure)     CRI (chronic renal insufficiency) 6/14/2013    Depression 6/14/2013    Diabetes mellitus     Fatigue 6/14/2013    Heart block     Hyperlipidemia     Hypertension     LBBB (left bundle branch block) 12/17/2014    Mitral regurgitation 6/14/2013    Mixed hyperlipidemia 6/14/2013    Pulmonary nodule 6/14/2013    Thyroid disease     TR (tricuspid regurgitation) 6/14/2013    Ventricular fibrillation 6/14/2013     No past surgical history on file.    Family History       Problem Relation (Age of Onset)    Heart attack Father    Hyperlipidemia Father    Hypertension Father, Daughter          Tobacco Use    Smoking status: Never Smoker    Smokeless tobacco: Never Used   Substance and Sexual Activity    Alcohol use: Yes     Alcohol/week: 7.0 standard drinks     Types: 7 Glasses of wine per week    Drug use: No    Sexual activity: Not on file     Review of Systems   Constitutional:   Negative for activity change, appetite change, chills and fever.   Respiratory:  Positive for shortness of breath (Currently on oxygen).    Cardiovascular:  Negative for leg swelling.   Gastrointestinal:  Positive for abdominal pain, nausea and vomiting (Caused by anxiety).   Skin:  Positive for color change and wound.   Neurological:  Positive for weakness and numbness. Negative for facial asymmetry.   Psychiatric/Behavioral:  Negative for decreased concentration and dysphoric mood. The patient is nervous/anxious.    Objective:     Vital Signs (Most Recent):  Temp: 98.9 °F (37.2 °C) (08/20/22 0800)  Pulse: 84 (08/20/22 0800)  Resp: 20 (08/20/22 0800)  BP: (!) 164/77 (08/20/22 0913)  SpO2: (!) 94 % (08/20/22 0800)   Vital Signs (24h Range):  Temp:  [98.2 °F (36.8 °C)-98.9 °F (37.2 °C)] 98.9 °F (37.2 °C)  Pulse:  [] 84  Resp:  [18-34] 20  SpO2:  [94 %-97 %] 94 %  BP: (142-181)/() 164/77     Weight: 69.9 kg (154 lb)  Body mass index is 29.1 kg/m².    Foot Exam    CONSTITUTIONAL:  Patient is awake, alert, and oriented to person, place, and time.  Patient is well groomed with normal appearance.  Seen with daughter present    EYES:  Pupils are equal and reactive to light.  No icterus    ENT:  Mucous membranes are moist.  Dentition intact.    PULMONARY:  Oxygen in place via nasal canula    VASCULAR:  The right dorsalis pedis pulse 1/4 and the right posterior tibial pulse 1/4.  The left dorsalis pedis pulse 1/4 and posterior tibial pulse on the left is 1/4.  Capillary refill is intact.  Pedal hair growth decreased.     NEUROLOGICAL:  Protective sensation is not intact to the right left foot.  Vibratory sensation is diminished.  Proprioception is intact.  Sharp/dull is reduced.    DERMATOLOGICAL:    Stable wound present to the distal aspect of the left 2nd toe with circumferential erythema.  No probe to bone.  No underlying purulence or drainage.    Dorsal blister present with underlying mixed serous and  purulent drainage.  There is small wound present to the medial aspect of the blister formation she does have a small amount of purulence expressed.  Area was flushed with copious amounts normal saline until running clear.  Healed fissure on the plantar aspect of the right foot    MUSCULOSKELETAL:Right hallux abductovalgus with crossover 2nd toe.        Laboratory:  A1C: No results for input(s): HGBA1C in the last 4320 hours.  Blood Cultures: No results for input(s): LABBLOO in the last 48 hours.  Cardiac Markers: No results for input(s): CKMB, TROPONINT, MYOGLOBIN in the last 168 hours.  CBC:   Recent Labs   Lab 08/20/22 0510   WBC 23.71*   RBC 5.30   HGB 14.9   HCT 48.6*      MCV 92   MCH 28.1   MCHC 30.7*     CMP:   Recent Labs   Lab 08/20/22 0841   GLU 98   CALCIUM 9.2   ALBUMIN 3.6   PROT 7.1      K 4.3   CO2 22*      BUN 33*   CREATININE 1.3   ALKPHOS 105   ALT 34   AST 46*   BILITOT 0.9     CRP:   Recent Labs   Lab 08/20/22 0510   .8*     ESR:   Recent Labs   Lab 08/20/22 0511   SEDRATE 18     Wound Cultures: No results for input(s): LABAERO in the last 4320 hours.  Microbiology Results (last 7 days)       Procedure Component Value Units Date/Time    Aerobic culture [[id number]]     Order Status: No result Specimen: Abscess from Foot, Right     Blood culture [[id number]] Collected: 08/20/22 0511    Order Status: Sent Specimen: Blood Updated: 08/20/22 0511    Blood culture [[id number]] Collected: 08/20/22 0511    Order Status: Sent Specimen: Blood Updated: 08/20/22 0511    Urine culture [[id number]] Collected: 08/20/22 0035    Order Status: No result Specimen: Urine Updated: 08/20/22 0051            Diagnostic Results:  I have reviewed all pertinent imaging results/findings within the past 24 hours.    Cellulitis of the left 2nd toe with stable overlying eschar to the nail bed.  Source of infection likely the right foot a small abscess was noted to the forefoot which was drained at  bedside.  Cultures taken for microbiology

## 2022-08-20 NOTE — ASSESSMENT & PLAN NOTE
Elevated white count.  Suspect source of infection was the right foot abscess which was noted underlying the blister.  Cellulitis present to the left 2nd toe.  No signs or acute signs of osteomyelitis.  Continue antibiotics per Hospital Medicine.  Culture was taken of the right foot abscess

## 2022-08-21 LAB
CREAT SERPL-MCNC: 1.1 MG/DL (ref 0.5–1.4)
EST. GFR  (NO RACE VARIABLE): 48 ML/MIN/1.73 M^2
VANCOMYCIN SERPL-MCNC: 12.3 UG/ML

## 2022-08-21 PROCEDURE — 25000003 PHARM REV CODE 250: Performed by: FAMILY MEDICINE

## 2022-08-21 PROCEDURE — 94761 N-INVAS EAR/PLS OXIMETRY MLT: CPT

## 2022-08-21 PROCEDURE — 63600175 PHARM REV CODE 636 W HCPCS: Performed by: FAMILY MEDICINE

## 2022-08-21 PROCEDURE — 21400001 HC TELEMETRY ROOM

## 2022-08-21 PROCEDURE — 99900035 HC TECH TIME PER 15 MIN (STAT)

## 2022-08-21 PROCEDURE — 27000221 HC OXYGEN, UP TO 24 HOURS

## 2022-08-21 PROCEDURE — 82565 ASSAY OF CREATININE: CPT | Performed by: FAMILY MEDICINE

## 2022-08-21 PROCEDURE — 80202 ASSAY OF VANCOMYCIN: CPT | Performed by: FAMILY MEDICINE

## 2022-08-21 RX ADMIN — CLONAZEPAM 0.5 MG: 0.5 TABLET ORAL at 08:08

## 2022-08-21 RX ADMIN — LEVOTHYROXINE SODIUM 75 MCG: 75 TABLET ORAL at 05:08

## 2022-08-21 RX ADMIN — ASPIRIN 81 MG: 81 TABLET, COATED ORAL at 08:08

## 2022-08-21 RX ADMIN — FLUOXETINE 20 MG: 20 CAPSULE ORAL at 08:08

## 2022-08-21 RX ADMIN — CARVEDILOL 6.25 MG: 6.25 TABLET, FILM COATED ORAL at 08:08

## 2022-08-21 RX ADMIN — CEFEPIME 2 G: 2 INJECTION, POWDER, FOR SOLUTION INTRAVENOUS at 01:08

## 2022-08-21 RX ADMIN — VANCOMYCIN HYDROCHLORIDE 750 MG: 750 INJECTION, POWDER, LYOPHILIZED, FOR SOLUTION INTRAVENOUS at 08:08

## 2022-08-21 RX ADMIN — HYDRALAZINE HYDROCHLORIDE 10 MG: 20 INJECTION, SOLUTION INTRAMUSCULAR; INTRAVENOUS at 04:08

## 2022-08-21 RX ADMIN — CARVEDILOL 6.25 MG: 6.25 TABLET, FILM COATED ORAL at 04:08

## 2022-08-21 NOTE — HPI
89-year-old [facility] CHF s/p ICD, CAD who was admitted for nausea, vomiting, abdominal pain following of panic attack.  Became short of breath during this episode.  Patient was found to be septic with infection possibly due to left toe osteomyelitis.  Being followed by Podiatry.  Currently on IV antibiotics.  Patient had troponin elevation 0.317->0.40 3->0.36.  .8.  Patient was started on heparin infusion  EKG with stable left bundle-branch block  Patient had LHC in 2013 showing LAD 30-40% 100% RCA occlusion, OM1 40-50%   Patient does not follow up regularly with Cardiology  Denies chest pain  Echo with EF 55%, mild MR

## 2022-08-21 NOTE — SUBJECTIVE & OBJECTIVE
Interval History: See hospital course for today      Review of Systems   Constitutional:  Positive for activity change and fatigue. Negative for appetite change and fever.   Respiratory:  Negative for shortness of breath.    Cardiovascular:  Negative for chest pain and leg swelling.   Gastrointestinal:  Positive for nausea (improved) and vomiting (improved). Negative for abdominal pain.   Musculoskeletal:  Positive for arthralgias, gait problem, myalgias and neck pain.   Skin:  Positive for color change and wound.   Neurological:  Positive for weakness.   Psychiatric/Behavioral:  Positive for sleep disturbance (improved). The patient is nervous/anxious.    Objective:     Vital Signs (Most Recent):  Temp: 98.9 °F (37.2 °C) (08/21/22 1214)  Pulse: 79 (08/21/22 1214)  Resp: 18 (08/21/22 1214)  BP: (!) 195/85 (08/21/22 1214)  SpO2: 96 % (08/21/22 1214)   Vital Signs (24h Range):  Temp:  [97.9 °F (36.6 °C)-99.9 °F (37.7 °C)] 98.9 °F (37.2 °C)  Pulse:  [60-85] 79  Resp:  [16-20] 18  SpO2:  [95 %-99 %] 96 %  BP: (107-195)/(55-86) 195/85     Weight: 66.4 kg (146 lb 6.2 oz)  Body mass index is 27.66 kg/m².    Intake/Output Summary (Last 24 hours) at 8/21/2022 1231  Last data filed at 8/21/2022 0830  Gross per 24 hour   Intake 240 ml   Output --   Net 240 ml      Physical Exam  Vitals and nursing note reviewed. Exam conducted with a chaperone present (family).   Constitutional:       General: She is not in acute distress.     Appearance: She is overweight. She is ill-appearing. She is not toxic-appearing.      Interventions: Nasal cannula in place.   HENT:      Head: Normocephalic and atraumatic.      Mouth/Throat:      Dentition: Abnormal dentition.   Cardiovascular:      Rate and Rhythm: Normal rate.   Pulmonary:      Effort: Tachypnea and respiratory distress present.      Breath sounds: Decreased air movement present.   Abdominal:      Palpations: Abdomen is soft.      Tenderness: There is no abdominal tenderness.    Musculoskeletal:         General: Tenderness and deformity present.      Right lower leg: No edema.      Left lower leg: No edema.   Skin:     General: Skin is warm and dry.      Capillary Refill: Capillary refill takes 2 to 3 seconds.      Findings: Erythema and lesion present.   Neurological:      Mental Status: She is alert. Mental status is at baseline.      Motor: Weakness present.   Psychiatric:         Behavior: Behavior is cooperative.       Significant Labs: All pertinent labs within the past 24 hours have been reviewed.  Blood Culture:   Recent Labs   Lab 08/20/22  0511   LABBLOO No Growth to date  No Growth to date     CBC:   Recent Labs   Lab 08/19/22 2139 08/20/22  0510   WBC 25.94* 23.71*   HGB 15.3 14.9   HCT 46.7 48.6*    251     CMP:   Recent Labs   Lab 08/19/22 2139 08/20/22  0841 08/21/22  0503    142  --    K 3.4* 4.3  --     104  --    CO2 17* 22*  --    * 98  --    BUN 29* 33*  --    CREATININE 1.2 1.3 1.1   CALCIUM 9.6 9.2  --    PROT 7.8 7.1  --    ALBUMIN 3.7 3.6  --    BILITOT 0.7 0.9  --    ALKPHOS 115 105  --    AST 53* 46*  --    ALT 33 34  --    ANIONGAP 17* 16  --        Significant Imaging: I have reviewed all pertinent imaging results/findings within the past 24 hours.  Mri pending

## 2022-08-21 NOTE — ASSESSMENT & PLAN NOTE
"Leukocytosis and tachycardia noted  Chest xray and abd ct unremarkable  Possible source of infection is skin and possibly bone  Will cover empirically with cefepime and vanc  Blood cultures  U/a pending     This patient does have evidence of infective focus  My overall impression is sepsis. Vital signs were reviewed and noted in progress note.  Antibiotics given-   Antibiotics (From admission, onward)            Start     Stop Route Frequency Ordered    08/20/22 0154  vancomycin - pharmacy to dose  (vancomycin IVPB)        "And" Linked Group Details    -- IV pharmacy to manage frequency 08/20/22 0054    08/20/22 0115  cefepime in dextrose 5 % IVPB 2 g         -- IV Every 24 hours (non-standard times) 08/19/22 2341        Cultures were taken-   Microbiology Results (last 7 days)     Procedure Component Value Units Date/Time    Aerobic culture [[id number]] Collected: 08/20/22 1700    Order Status: Sent Specimen: Abscess from Foot, Right Updated: 08/21/22 0904    Blood culture [[id number]] Collected: 08/20/22 0511    Order Status: Completed Specimen: Blood Updated: 08/21/22 0115     Blood Culture, Routine No Growth to date    Blood culture [[id number]] Collected: 08/20/22 0511    Order Status: Completed Specimen: Blood Updated: 08/21/22 0115     Blood Culture, Routine No Growth to date    Urine culture [[id number]] Collected: 08/20/22 0035    Order Status: No result Specimen: Urine Updated: 08/20/22 0051        Latest lactate reviewed, they are-  Recent Labs   Lab 08/19/22  2351   LACTATE 1.8       Organ dysfunction indicated by Acute respiratory failure  Source- foot    Source control Achieved by- intravenous antibiotic(s) and I&D      "

## 2022-08-21 NOTE — ASSESSMENT & PLAN NOTE
Troponin trending down  No significant EKG changes  Due to demand ischemia in the setting of sepsis due to osteomyelitis (leukocytosis, elevated CRP level)  Currently on IV antibiotics  Recommend stopping heparin infusion  Will follow-up in cardiology clinic for ischemia evaluation  Continue aspirin

## 2022-08-21 NOTE — HOSPITAL COURSE
8/21 admitted for nstemi. Cardiology consulted and elevated troponin(s) related to demand ischemia from foot infection. Podiatry consulted and performed I&D at bedside of right foot. Podiatry recommends treating as cellulitis and does not feel osteo. Infectious disease consulted for antibiotic(s). Mri pending. Patient with aicd.  8/22 anxious and sleep disturbance overnight. Social and infectious disease consulted for home intravenous antibiotic(s) for empiric treatment of osteomyelitis. Tolerated picc placement    Patient agreeable to intravenous antibiotic(s) for osteomyelitis per infectious disease recommendations, vanc and cef daily for 6 weeks.    Patient seen and evaluated by me. Patient was determined to be suitable for d/c. Patient deemed stable for discharge to home with homehealth physical/occupational therapy and nurse practitioner to visit.

## 2022-08-21 NOTE — ASSESSMENT & PLAN NOTE
No chest pain reported   Trend troponin   Echo   Cardiology consulted on case  Heparin drip  Cont npo  Will gently hydrate at 50cc/hr once     8/21  Ruled out  Follow up primary cardiology outpatient   Cardiology recommended discontinue heparin gtt

## 2022-08-21 NOTE — PROGRESS NOTES
Pharmacokinetic Assessment Follow Up: IV Vancomycin    Vancomycin serum concentration assessment(s):    The random level was drawn correctly and can be used to guide therapy at this time. The measurement is below the desired definitive target range of 15 to 20 mcg/mL.    Vancomycin Regimen Plan:    Will continue pulse dosing regimen with a one time dose of vancomycin 750 mg.     Re-dose when the random level is less than 20 mcg/mL, next level to be drawn at 0600 on 08/22 with AM labs    Drug levels (last 3 results):  Recent Labs   Lab Result Units 08/21/22  0503   Vancomycin, Random ug/mL 12.3       Pharmacy will continue to follow and monitor vancomycin.    Please contact pharmacy at extension [phone] for questions regarding this assessment.    Thank you for the consult,   Jade Escudero       Patient brief summary:  Marysol Yancey is a 89 y.o. female initiated on antimicrobial therapy with IV Vancomycin for treatment of bone/joint infection    The patient's current regimen is pulse dosing.     Drug Allergies:   Review of patient's allergies indicates:   Allergen Reactions    Zetia [ezetimibe]     Atorvastatin     Avelox [moxifloxacin]     Erythromycin     Lisinopril     Norvasc [amlodipine]     Pcn [penicillins]     Statins-hmg-coa reductase inhibitors        Actual Body Weight:   66.4 kg    Renal Function:   Estimated Creatinine Clearance: 25.6 mL/min (based on SCr of 1.3 mg/dL).,     Dialysis Method (if applicable):  N/A    CBC (last 72 hours):  Recent Labs   Lab Result Units 08/19/22 2139 08/20/22  0510   WBC K/uL 25.94* 23.71*   Hemoglobin g/dL 15.3 14.9   Hematocrit % 46.7 48.6*   Platelets K/uL 209 251   Gran % % 90.6* 91.1*   Lymph % % 2.2* 4.3*   Mono % % 5.5 3.6*   Eosinophil % % 0.1 0.1   Basophil % % 0.5 0.4   Differential Method  Automated Automated       Metabolic Panel (last 72 hours):  Recent Labs   Lab Result Units 08/19/22 2139 08/20/22  0035 08/20/22  0841   Sodium mmol/L 140  --  142   Potassium  mmol/L 3.4*  --  4.3   Chloride mmol/L 106  --  104   CO2 mmol/L 17*  --  22*   Glucose mg/dL 119*  --  98   Glucose, UA   --  Negative  --    BUN mg/dL 29*  --  33*   Creatinine mg/dL 1.2  --  1.3   Albumin g/dL 3.7  --  3.6   Total Bilirubin mg/dL 0.7  --  0.9   Alkaline Phosphatase U/L 115  --  105   AST U/L 53*  --  46*   ALT U/L 33  --  34       Vancomycin Administrations:  vancomycin given in the last 96 hours                     vancomycin 1.5 g in dextrose 5 % 250 mL IVPB (ready to mix) (mg) 1,500 mg New Bag 08/20/22 0622                    Microbiologic Results:  Microbiology Results (last 7 days)       Procedure Component Value Units Date/Time    Blood culture [[id number]] Collected: 08/20/22 0511    Order Status: Completed Specimen: Blood Updated: 08/21/22 0115     Blood Culture, Routine No Growth to date    Blood culture [[id number]] Collected: 08/20/22 0511    Order Status: Completed Specimen: Blood Updated: 08/21/22 0115     Blood Culture, Routine No Growth to date    Aerobic culture [[id number]] Collected: 08/20/22 1700    Order Status: Sent Specimen: Abscess from Foot, Right Updated: 08/20/22 1809    Urine culture [[id number]] Collected: 08/20/22 0035    Order Status: No result Specimen: Urine Updated: 08/20/22 0051

## 2022-08-21 NOTE — PROGRESS NOTES
[facility] Medicine  Progress Note    Patient Name: Marysol Yancey  MRN: [id number]  Patient Class: IP- Inpatient   Admission Date: 8/19/2022  Length of Stay: 2 days  Attending Physician: Eliseo Spence MD  Primary Care Provider: Bruno Nugent MD        Subjective:     Principal Problem:NSTEMI (non-ST elevated myocardial infarction)        HPI:  Patient is a 89 y.o.  female with a PMHx of MI, CHF, HLD, and defibrillator implant in L chest (installed in 2013) who presents to the Emergency Department for evaluation of R-side abdominal pain. Patient reported symptoms occurring earlier today. Abd pain is exacerbated by deep inspiration. Reports having nausea and other associated symptoms include dizziness, sob, and tremors. States last bowel movement was earlier today. Denies any other issues.     In the ED, labs concerning for troponin 0.317, , WBC: 25.9. CT abd did not reveal source of infection or abdominal pain. Chest xray unremarkable. Cards consulted and heparin started.  consulted and patient admitted.       Overview/Hospital Course:  8/21 admitted for nstemi. Cardiology consulted and elevated troponin(s) related to demand ischemia from foot infection. Podiatry consulted and performed I&D at bedside of right foot. Podiatry recommends treating as cellulitis and does not feel osteo. Infectious disease consulted for antibiotic(s). Mri pending. Patient with aicd.      Interval History: See hospital course for today      Review of Systems   Constitutional:  Positive for activity change and fatigue. Negative for appetite change and fever.   Respiratory:  Negative for shortness of breath.    Cardiovascular:  Negative for chest pain and leg swelling.   Gastrointestinal:  Positive for nausea (improved) and vomiting (improved). Negative for abdominal pain.   Musculoskeletal:  Positive for arthralgias, gait problem, myalgias and neck pain.   Skin:  Positive for color change and  wound.   Neurological:  Positive for weakness.   Psychiatric/Behavioral:  Positive for sleep disturbance (improved). The patient is nervous/anxious.    Objective:     Vital Signs (Most Recent):  Temp: 98.9 °F (37.2 °C) (08/21/22 1214)  Pulse: 79 (08/21/22 1214)  Resp: 18 (08/21/22 1214)  BP: (!) 195/85 (08/21/22 1214)  SpO2: 96 % (08/21/22 1214)   Vital Signs (24h Range):  Temp:  [97.9 °F (36.6 °C)-99.9 °F (37.7 °C)] 98.9 °F (37.2 °C)  Pulse:  [60-85] 79  Resp:  [16-20] 18  SpO2:  [95 %-99 %] 96 %  BP: (107-195)/(55-86) 195/85     Weight: 66.4 kg (146 lb 6.2 oz)  Body mass index is 27.66 kg/m².    Intake/Output Summary (Last 24 hours) at 8/21/2022 1231  Last data filed at 8/21/2022 0830  Gross per 24 hour   Intake 240 ml   Output --   Net 240 ml      Physical Exam  Vitals and nursing note reviewed. Exam conducted with a chaperone present (family).   Constitutional:       General: She is not in acute distress.     Appearance: She is overweight. She is ill-appearing. She is not toxic-appearing.      Interventions: Nasal cannula in place.   HENT:      Head: Normocephalic and atraumatic.      Mouth/Throat:      Dentition: Abnormal dentition.   Cardiovascular:      Rate and Rhythm: Normal rate.   Pulmonary:      Effort: Tachypnea and respiratory distress present.      Breath sounds: Decreased air movement present.   Abdominal:      Palpations: Abdomen is soft.      Tenderness: There is no abdominal tenderness.   Musculoskeletal:         General: Tenderness and deformity present.      Right lower leg: No edema.      Left lower leg: No edema.   Skin:     General: Skin is warm and dry.      Capillary Refill: Capillary refill takes 2 to 3 seconds.      Findings: Erythema and lesion present.   Neurological:      Mental Status: She is alert. Mental status is at baseline.      Motor: Weakness present.   Psychiatric:         Behavior: Behavior is cooperative.       Significant Labs: All pertinent labs within the past 24 hours  have been reviewed.  Blood Culture:   Recent Labs   Lab 08/20/22  0511   LABBLOO No Growth to date  No Growth to date     CBC:   Recent Labs   Lab 08/19/22 2139 08/20/22  0510   WBC 25.94* 23.71*   HGB 15.3 14.9   HCT 46.7 48.6*    251     CMP:   Recent Labs   Lab 08/19/22 2139 08/20/22  0841 08/21/22  0503    142  --    K 3.4* 4.3  --     104  --    CO2 17* 22*  --    * 98  --    BUN 29* 33*  --    CREATININE 1.2 1.3 1.1   CALCIUM 9.6 9.2  --    PROT 7.8 7.1  --    ALBUMIN 3.7 3.6  --    BILITOT 0.7 0.9  --    ALKPHOS 115 105  --    AST 53* 46*  --    ALT 33 34  --    ANIONGAP 17* 16  --        Significant Imaging: I have reviewed all pertinent imaging results/findings within the past 24 hours.  Mri pending      Assessment/Plan:      * NSTEMI (non-ST elevated myocardial infarction)  No chest pain reported   Trend troponin   Echo   Cardiology consulted on case  Heparin drip  Cont npo  Will gently hydrate at 50cc/hr once     8/21  Ruled out  Follow up primary cardiology outpatient   Cardiology recommended discontinue heparin gtt      Foot infection  Bilateral foot infection noted  Right dorsum - purulence expressed  Will need to cover for MRSA  Left 2nd digit hyperpigmentation  Will obtain xray, may need CT to r/o osteo  Cont vanc    8/21  Continue intravenous antibiotic(s)   Infectious disease consulted for recommendations   Elevated inflammatory markers  Afebrile   Mri pending but has aicd  Ct foot alternative     Severe sepsis  Leukocytosis and tachycardia noted  Chest xray and abd ct unremarkable  Possible source of infection is skin and possibly bone  Will cover empirically with cefepime and vanc  Blood cultures  U/a pending     This patient does have evidence of infective focus  My overall impression is sepsis. Vital signs were reviewed and noted in progress note.  Antibiotics given-   Antibiotics (From admission, onward)            Start     Stop Route Frequency Ordered    08/20/22  "0154  vancomycin - pharmacy to dose  (vancomycin IVPB)        "And" Linked Group Details    -- IV pharmacy to manage frequency 08/20/22 0054 08/20/22 0115  cefepime in dextrose 5 % IVPB 2 g         -- IV Every 24 hours (non-standard times) 08/19/22 2341        Cultures were taken-   Microbiology Results (last 7 days)     Procedure Component Value Units Date/Time    Aerobic culture [[id number]] Collected: 08/20/22 1700    Order Status: Sent Specimen: Abscess from Foot, Right Updated: 08/21/22 0904    Blood culture [[id number]] Collected: 08/20/22 0511    Order Status: Completed Specimen: Blood Updated: 08/21/22 0115     Blood Culture, Routine No Growth to date    Blood culture [[id number]] Collected: 08/20/22 0511    Order Status: Completed Specimen: Blood Updated: 08/21/22 0115     Blood Culture, Routine No Growth to date    Urine culture [[id number]] Collected: 08/20/22 0035    Order Status: No result Specimen: Urine Updated: 08/20/22 0051        Latest lactate reviewed, they are-  Recent Labs   Lab 08/19/22  2351   LACTATE 1.8       Organ dysfunction indicated by Acute respiratory failure  Source- foot    Source control Achieved by- intravenous antibiotic(s) and I&D        Hypertension  Resume home meds  Hydralazine prn       ICD (implantable cardioverter-defibrillator) in place        CAD (coronary artery disease)  Resume home meds        VTE Risk Mitigation (From admission, onward)    None          Discharge Planning   IMTIAZ:      Code Status: Partial Code   Is the patient medically ready for discharge?:     Reason for patient still in hospital (select all that apply): Patient trending condition, Laboratory test, Treatment, Imaging, Consult recommendations and Pending disposition  Discharge Plan A: Home with family                  Eliseo Spence MD  Department of Hospital Medicine   [facility] ([facility])  "

## 2022-08-21 NOTE — ASSESSMENT & PLAN NOTE
Bilateral foot infection noted  Right dorsum - purulence expressed  Will need to cover for MRSA  Left 2nd digit hyperpigmentation  Will obtain xray, may need CT to r/o osteo  Cont vanc    8/21  Continue intravenous antibiotic(s)   Infectious disease consulted for recommendations   Elevated inflammatory markers  Afebrile   Mri pending but has aicd  Ct foot alternative

## 2022-08-21 NOTE — ASSESSMENT & PLAN NOTE
Does not follow up regularly with Cardiology  Denies chest pain  Continue aspirin  Has statin intolerance

## 2022-08-21 NOTE — ASSESSMENT & PLAN NOTE
Does not follow up regularly with Cardiology  Denies chest pain  Continue aspirin  If no procedures planned, can start Plavix 75 mg daily  Has statin intolerance

## 2022-08-21 NOTE — CONSULTS
[facility] (Hospital)  Cardiology  Consult Note    Patient Name: Marysol Yancey  MRN: [id number]  Admission Date: 8/19/2022  Hospital Length of Stay: 1 days  Code Status: Partial Code   Attending Provider: Eliseo Spence MD   Consulting Provider: Chan Vivra MD  Primary Care Physician: Bruno Nugent MD  Principal Problem:NSTEMI (non-ST elevated myocardial infarction)    Patient information was obtained from patient, relative(s) and ER records.     Inpatient consult to Cardiology  Consult performed by: Chan Vivar MD  Consult ordered by: Eliseo Spence MD  Reason for consult: Troponin elevation        Subjective:     Reason for consult: troponin elevation     HPI:   89-year-old fwith [facility] CHF s/p ICD, CAD who was admitted for nausea, vomiting, abdominal pain following of panic attack.  Became short of breath during this episode.  Patient was found to be septic with infection possibly due to left toe osteomyelitis.  Being followed by Podiatry.  Currently on IV antibiotics.  Patient had troponin elevation 0.317->0.40 3->0.36.  .8.  Patient was started on heparin infusion  EKG with stable left bundle-branch block  Patient had LHC in 2013 showing LAD 30-40% 100% RCA occlusion, OM1 40-50%   Patient does not follow up regularly with Cardiology  Denies chest pain  Echo with EF 55%, mild MR        No new subjective & objective note has been filed under this hospital service since the last note was generated.    Assessment and Plan:     * NSTEMI (non-ST elevated myocardial infarction)  Troponin trending down  No significant EKG changes  Due to demand ischemia in the setting of sepsis due to osteomyelitis (leukocytosis, elevated CRP level)  Currently on IV antibiotics  Recommend stopping heparin infusion  Will follow-up in cardiology clinic for ischemia evaluation  Continue aspirin    Severe sepsis  Osteomyelitis of the toe  Management per hospital medicine/podiatry      ICD (implantable  cardioverter-defibrillator) in place  Does not follow up regularly in cardiology clinic  Will also need to follow-up with device clinic Clinic    CAD (coronary artery disease)  Does not follow up regularly with Cardiology  Denies chest pain  Continue aspirin  If no procedures planned, can start Plavix 75 mg daily  Has statin intolerance      VTE Risk Mitigation (From admission, onward)    None          Thank you for your consult. I will sign off. Please contact us if you have any additional questions.     Will have patient scheduled in cardiology clinic    Chan Vivar MD  Cardiology   [facility] ([facility])

## 2022-08-21 NOTE — SUBJECTIVE & OBJECTIVE
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    Review of patient's allergies indicates:   Allergen Reactions    Zetia [ezetimibe]     Atorvastatin     Avelox [moxifloxacin]     Erythromycin     Lisinopril     Norvasc [amlodipine]     Pcn [penicillins]     Statins-hmg-coa reductase inhibitors        No current facility-administered medications on file prior to encounter.     Current Outpatient Medications on File Prior to Encounter   Medication Sig    aspirin (ECOTRIN) 325 MG EC tablet Take 325 mg by mouth once daily.    carvedilol (COREG) 6.25 MG tablet Take 1 tablet (6.25 mg total) by mouth 2 (two) times daily with meals.    clonazePAM (KLONOPIN) 0.5 MG tablet Take 0.5 mg by mouth every evening.    cloNIDine (CATAPRES) 0.2 MG tablet Take 1 tablet by mouth every evening.    fluoxetine (PROZAC) 20 MG tablet Take 20 mg by mouth once daily.    levothyroxine (SYNTHROID) 75 MCG tablet Take 25 mcg by mouth once daily.    naproxen sodium (ALEVE) 220 mg Cap Take 1 tablet by mouth every evening. Aleve PM    omega-3 fatty acids (FISH OIL CONCENTRATE ORAL) Take 1,000 mg by mouth once daily.    sertraline (ZOLOFT) 25 MG tablet Take 25 mg by mouth once daily.    [DISCONTINUED] aspirin (ECOTRIN) 81 MG EC tablet Take 81 mg by mouth once daily.    [DISCONTINUED]  triamterene-hydrochlorothiazide 37.5-25 mg (MAXZIDE-25) 37.5-25 mg per tablet Take 0.5 tablets by mouth once daily.     Family History       Problem Relation (Age of Onset)    Heart attack Father    Hyperlipidemia Father    Hypertension Father, Daughter          Tobacco Use    Smoking status: Never Smoker    Smokeless tobacco: Never Used   Substance and Sexual Activity    Alcohol use: Yes     Alcohol/week: 7.0 standard drinks     Types: 7 Glasses of wine per week    Drug use: No    Sexual activity: Not on file     Review of Systems   Constitutional: Positive for malaise/fatigue. Negative for diaphoresis, weight gain and weight loss.   HENT:  Negative for congestion and nosebleeds.    Cardiovascular:  Negative for chest pain, claudication, cyanosis, dyspnea on exertion, irregular heartbeat, leg swelling, near-syncope, orthopnea, palpitations, paroxysmal nocturnal dyspnea and syncope.   Respiratory:  Positive for shortness of breath. Negative for cough, hemoptysis, sleep disturbances due to breathing, snoring, sputum production and wheezing.    Hematologic/Lymphatic: Negative for bleeding problem. Does not bruise/bleed easily.   Skin:  Negative for rash.   Musculoskeletal:  Negative for arthritis, back pain, falls, joint pain, muscle cramps and muscle weakness.   Gastrointestinal:  Positive for abdominal pain and vomiting. Negative for constipation, diarrhea, heartburn, hematemesis, hematochezia, melena and nausea.   Genitourinary:  Negative for dysuria, hematuria and nocturia.   Neurological:  Positive for weakness. Negative for excessive daytime sleepiness, dizziness, headaches, light-headedness, loss of balance, numbness and vertigo.   Objective:     Vital Signs (Most Recent):  Temp: 99.9 °F (37.7 °C) (08/20/22 2012)  Pulse: 79 (08/20/22 2012)  Resp: 20 (08/20/22 2012)  BP: (!) 148/68 (08/20/22 2012)  SpO2: 96 % (08/20/22 2012)   Vital Signs (24h Range):  Temp:  [98.2 °F (36.8 °C)-99.9 °F (37.7 °C)] 99.9 °F (37.7  °C)  Pulse:  [73-91] 79  Resp:  [18-34] 20  SpO2:  [94 %-98 %] 96 %  BP: (142-190)/(68-86) 148/68     Weight: 69.9 kg (154 lb)  Body mass index is 29.1 kg/m².    SpO2: 96 %  O2 Device (Oxygen Therapy): nasal cannula    No intake or output data in the 24 hours ending 08/20/22 2041    Lines/Drains/Airways       Peripheral Intravenous Line  Duration                  Peripheral IV - Single Lumen 08/19/22 2140 20 G Left Wrist <1 day                    Physical Exam  Vitals and nursing note reviewed.   Constitutional:       Appearance: Normal appearance. She is well-developed.   HENT:      Head: Normocephalic.      Mouth/Throat:      Mouth: Mucous membranes are moist.   Neck:      Vascular: No carotid bruit or JVD.   Cardiovascular:      Rate and Rhythm: Normal rate and regular rhythm.      Pulses: Normal pulses.      Heart sounds: Normal heart sounds. No murmur heard.    No friction rub.   Pulmonary:      Effort: Pulmonary effort is normal. No respiratory distress.      Breath sounds: Normal breath sounds. No wheezing or rales.   Abdominal:      General: Bowel sounds are normal. There is no distension.      Palpations: Abdomen is soft.      Tenderness: There is no abdominal tenderness. There is no guarding.   Musculoskeletal:         General: No swelling or tenderness.      Cervical back: Neck supple. No tenderness.      Right lower leg: No edema.      Left lower leg: No edema.      Comments: Left 2nd toe abrasion.    Skin:     General: Skin is warm and dry.      Capillary Refill: Capillary refill takes less than 2 seconds.      Findings: No rash.   Neurological:      General: No focal deficit present.      Mental Status: She is alert and oriented to person, place, and time.   Psychiatric:         Mood and Affect: Mood normal.         Behavior: Behavior normal.         Thought Content: Thought content normal.       Significant Labs: BMP:   Recent Labs   Lab 08/19/22 2139 08/20/22  0841   * 98    142   K  3.4* 4.3    104   CO2 17* 22*   BUN 29* 33*   CREATININE 1.2 1.3   CALCIUM 9.6 9.2   , CBC   Recent Labs   Lab 08/19/22 2139 08/20/22  0510   WBC 25.94* 23.71*   HGB 15.3 14.9   HCT 46.7 48.6*    251   , INR   Recent Labs   Lab 08/19/22 2348   INR 1.1   , and Troponin   Recent Labs   Lab 08/19/22 2139 08/19/22 2348 08/20/22  0510   TROPONINI 0.317* 0.403* 0.360*       Significant Imaging: Echocardiogram: 2D echo with color flow doppler:   Results for orders placed or performed in visit on 06/05/13   2D echo with color flow doppler   Result Value Ref Range    EF + QEF 55     Mitral Valve Regurgitation moderate     Diastolic Dysfunction Yes     Narrative    TEST DESCRIPTION   This is a technically adequate study. A catheter is present in the right-sided cardiac chambers.     The aortic root is normal in size, measuring 2.5 cm at sinotubular junction and 2.8 cm at Sinuses of Valsalva. The proximal ascending aorta is normal in size, measuring 2.9 cm across.     The left atrial volume index is mildly enlarged, measuring 32.41 cc/m2. The left ventricle is normal in size, with an end-diastolic diameter of 3.4 cm, and an end-systolic diameter of 2.4 cm. Septal wall thickness is increased, with the septum measuring   1.4 cm and the posterior wall measuring 1.1 cm across. Relative wall thickness was increased at 0.65, and the LV mass index was 90.9 g/m2 consistent with concentric remodeling.   Regional and global left ventricular systolic function appears normal with a visually estimated ejection fraction of 55%.   The LV Doppler derived stroke volume equals 52.0 ccs. The E/e'(lat) is 13, consistent with significant diastolic dysfunction.     The right atrium is normal in size, measuring 4.8 cm in length and 3.5 cm in width in the apical view. The right ventricle is normal in size measuring 3.6 cm at the base in the apical right ventricle-focused view. Global right ventricular systolic   function appears low  normal. There is abnormal septal motion consistent with paced rhythm. Right atrial pressure as assessed by IVC dynamics is intermediate at 8 mmHg. The estimated PA systolic pressure is 52 mmHg.     All valves appear structurally normal.     There is moderate mitral regurgitation.     There is moderate tricuspid regurgitation.     There is no evidence of pericardial effusion, intracavity mass, thrombi, or vegetation. The atrial septum is intact.         CONCLUSIONS     1 - Mild left atrial enlargement.     2 - Concentric remodeling.     3 - Normal left ventricular function (EF 55%).     4 - Diastolic dysfunction.     5 - Low normal right ventricular function .     6 - Intermediate central venous pressure.     7 - Pulmonary hypertension.     8 - Moderate mitral regurgitation.     9 - Moderate tricuspid regurgitation.         This document has been electronically    SIGNED BY: Love Melchor M.D. On: 06/05/2013 20:40    and Transthoracic echo (TTE) complete (Cupid Only):   Results for orders placed or performed during the hospital encounter of 08/19/22   Echo Saline Bubble? No   Result Value Ref Range    BSA 1.73 m2    TDI SEPTAL 0.04 m/s    LV LATERAL E/E' RATIO 18.60 m/s    LV SEPTAL E/E' RATIO 23.25 m/s    LA WIDTH 3.80 cm    IVC diameter 1.54 cm    Left Ventricular Outflow Tract Mean Velocity 0.83 cm/s    Left Ventricular Outflow Tract Mean Gradient 2.79 mmHg    TV mean gradient 19 mmHg    TDI LATERAL 0.05 m/s    LVIDd 3.31 (A) 3.5 - 6.0 cm    IVS 1.69 (A) 0.6 - 1.1 cm    Posterior Wall 1.50 (A) 0.6 - 1.1 cm    Ao root annulus 2.88 cm    LVIDs 2.41 2.1 - 4.0 cm    FS 27 28 - 44 %    LA volume 54.54 cm3    STJ 2.74 cm    Ascending aorta 2.64 cm    LV mass 198.92 g    LA size 3.50 cm    TAPSE 1.80 cm    Left Ventricle Relative Wall Thickness 0.91 cm    AV mean gradient 5 mmHg    AV valve area 2.03 cm2    AV Velocity Ratio 0.69     AV index (prosthetic) 0.72     MV valve area p 1/2 method 2.37 cm2    E/A ratio 0.75      Mean e' 0.05 m/s    E wave deceleration time 320.37 msec    IVRT 60.89 msec    LVOT diameter 1.90 cm    LVOT area 2.8 cm2    LVOT peak pierce 0.97 m/s    LVOT peak VTI 19.90 cm    Ao peak pierce 1.40 m/s    Ao VTI 27.8 cm    RVOT peak pierce 0.60 m/s    RVOT peak VTI 12.4 cm    Mr max pierce 3.37 m/s    LVOT stroke volume 56.39 cm3    AV peak gradient 8 mmHg    PV mean gradient 0.64 mmHg    E/E' ratio 20.67 m/s    MV Peak E Pierce 0.93 m/s    TR Max Pierce 2.34 m/s    MV stenosis pressure 1/2 time 92.91 ms    MV Peak A Pierce 1.24 m/s    LV Systolic Volume 20.42 mL    LV Systolic Volume Index 12.1 mL/m2    LV Diastolic Volume 44.58 mL    LV Diastolic Volume Index 26.38 mL/m2    LA Volume Index 32.3 mL/m2    LV Mass Index 118 g/m2    RA Major Axis 3.84 cm    Left Atrium Minor Axis 4.76 cm    Left Atrium Major Axis 4.89 cm    Triscuspid Valve Regurgitation Peak Gradient 22 mmHg    RA Width 2.90 cm    Right Atrial Pressure (from IVC) 3 mmHg    EF 55 %    TV rest pulmonary artery pressure 25 mmHg    Narrative    · The left ventricle is normal in size with concentric hypertrophy and   normal systolic function.  · The estimated PA systolic pressure is 25 mmHg.  · The estimated ejection fraction is 55%.  · Indeterminate left ventricular diastolic function.  · Normal right ventricular size with normal right ventricular systolic   function.  · Normal central venous pressure (3 mmHg).  · Mild mitral regurgitation.  · There is abnormal septal wall motion consistent with right ventricular   pacemaker.

## 2022-08-21 NOTE — ASSESSMENT & PLAN NOTE
Does not follow up regularly in cardiology clinic  Will also need to follow-up with device clinic Clinic

## 2022-08-22 VITALS
RESPIRATION RATE: 18 BRPM | HEIGHT: 61 IN | TEMPERATURE: 98 F | OXYGEN SATURATION: 96 % | DIASTOLIC BLOOD PRESSURE: 82 MMHG | BODY MASS INDEX: 27.34 KG/M2 | HEART RATE: 75 BPM | WEIGHT: 144.81 LBS | SYSTOLIC BLOOD PRESSURE: 181 MMHG

## 2022-08-22 PROBLEM — I21.4 NSTEMI (NON-ST ELEVATED MYOCARDIAL INFARCTION): Status: RESOLVED | Noted: 2022-08-19 | Resolved: 2022-08-22

## 2022-08-22 PROBLEM — M86.9 OSTEOMYELITIS OF RIGHT FOOT: Status: ACTIVE | Noted: 2022-08-20

## 2022-08-22 LAB
CREAT SERPL-MCNC: 1 MG/DL (ref 0.5–1.4)
EST. GFR  (NO RACE VARIABLE): 54 ML/MIN/1.73 M^2
VANCOMYCIN SERPL-MCNC: 13.6 UG/ML

## 2022-08-22 PROCEDURE — 25000003 PHARM REV CODE 250: Performed by: FAMILY MEDICINE

## 2022-08-22 PROCEDURE — 63600175 PHARM REV CODE 636 W HCPCS: Performed by: FAMILY MEDICINE

## 2022-08-22 PROCEDURE — 82565 ASSAY OF CREATININE: CPT | Performed by: FAMILY MEDICINE

## 2022-08-22 PROCEDURE — 80202 ASSAY OF VANCOMYCIN: CPT | Performed by: FAMILY MEDICINE

## 2022-08-22 PROCEDURE — 36415 COLL VENOUS BLD VENIPUNCTURE: CPT | Performed by: FAMILY MEDICINE

## 2022-08-22 RX ORDER — SODIUM CHLORIDE 9 MG/ML
INJECTION, SOLUTION INTRAVENOUS
Status: DISCONTINUED | OUTPATIENT
Start: 2022-08-22 | End: 2022-08-22 | Stop reason: HOSPADM

## 2022-08-22 RX ORDER — VANCOMYCIN HCL IN 5 % DEXTROSE 1G/250ML
15 PLASTIC BAG, INJECTION (ML) INTRAVENOUS ONCE
Status: COMPLETED | OUTPATIENT
Start: 2022-08-22 | End: 2022-08-22

## 2022-08-22 RX ORDER — CEFEPIME HYDROCHLORIDE 1 G/50ML
2 INJECTION, SOLUTION INTRAVENOUS DAILY
Status: ON HOLD
Start: 2022-08-22 | End: 2023-09-21 | Stop reason: HOSPADM

## 2022-08-22 RX ADMIN — CEFEPIME 2 G: 2 INJECTION, POWDER, FOR SOLUTION INTRAVENOUS at 12:08

## 2022-08-22 RX ADMIN — FLUOXETINE 20 MG: 20 CAPSULE ORAL at 08:08

## 2022-08-22 RX ADMIN — SODIUM CHLORIDE: 0.9 INJECTION, SOLUTION INTRAVENOUS at 12:08

## 2022-08-22 RX ADMIN — LEVOTHYROXINE SODIUM 75 MCG: 75 TABLET ORAL at 05:08

## 2022-08-22 RX ADMIN — CLONAZEPAM 0.5 MG: 0.5 TABLET ORAL at 06:08

## 2022-08-22 RX ADMIN — HYDRALAZINE HYDROCHLORIDE 10 MG: 20 INJECTION, SOLUTION INTRAMUSCULAR; INTRAVENOUS at 05:08

## 2022-08-22 RX ADMIN — CARVEDILOL 6.25 MG: 6.25 TABLET, FILM COATED ORAL at 08:08

## 2022-08-22 RX ADMIN — ASPIRIN 81 MG: 81 TABLET, COATED ORAL at 08:08

## 2022-08-22 RX ADMIN — VANCOMYCIN HYDROCHLORIDE 1000 MG: 1 INJECTION, POWDER, LYOPHILIZED, FOR SOLUTION INTRAVENOUS at 10:08

## 2022-08-22 NOTE — OP NOTE
Pre Op Diagnosis: no iv access; right foot infection     Post Op Diagnosis: same     Procedure:  RUE PICC line insertion     Procedure performed by: Chas HILARIO, Ellen ANDERSON     Written Informed Consent Obtained: Yes      Estimated Blood Loss:  minimal     Findings: Local anesthesia.     The patient tolerated the procedure well and there were no complications.      The RUE was sterilely prepped and draped.  Lidocaine was used as a local anesthetic.  Using US guidance a 4 Danish 36 cm double lumen Power PICC was placed into the brachial vein into the SVC/right atrium junction.  Placement was verified using X Ray.  PICC was secured in place with attachment device and is ready to use.  Pt tolerated procedure well without immediate complications.

## 2022-08-22 NOTE — INTERVAL H&P NOTE
The patient has been examined and the H&P has been reviewed:    I concur with the findings and no changes have occurred since H&P was written.    Plan for image guided PICC line placement    Active Hospital Problems    Diagnosis  POA    *NSTEMI (non-ST elevated myocardial infarction) [I21.4]  Yes    Severe sepsis [A41.9, R65.20]  Yes    Foot infection [L08.9]  Yes    CAD (coronary artery disease) [I25.10]  Yes     Chronic    ICD (implantable cardioverter-defibrillator) in place [Z95.810]  Yes    Hypertension [I10]  Yes     Chronic      Resolved Hospital Problems   No resolved problems to display.

## 2022-08-22 NOTE — SUBJECTIVE & OBJECTIVE
Interval History: See hospital course for today      Review of Systems   Constitutional:  Positive for activity change, appetite change (improved) and fatigue. Negative for fever.   HENT:  Positive for dental problem.    Respiratory:  Negative for shortness of breath.    Cardiovascular:  Negative for chest pain and leg swelling.   Gastrointestinal:  Negative for abdominal pain, diarrhea, nausea and vomiting.   Musculoskeletal:  Positive for arthralgias and myalgias.   Skin:  Positive for color change and wound.   Neurological:  Positive for weakness.   Psychiatric/Behavioral:  Positive for sleep disturbance (improved). The patient is nervous/anxious (improved).    Objective:     Vital Signs (Most Recent):  Temp: 97.9 °F (36.6 °C) (08/22/22 1231)  Pulse: 68 (08/22/22 1321)  Resp: 18 (08/22/22 1231)  BP: (!) 140/61 (08/22/22 1231)  SpO2: 96 % (08/22/22 1231)   Vital Signs (24h Range):  Temp:  [97.9 °F (36.6 °C)-99 °F (37.2 °C)] 97.9 °F (36.6 °C)  Pulse:  [66-85] 68  Resp:  [16-20] 18  SpO2:  [93 %-98 %] 96 %  BP: (116-213)/(57-91) 140/61     Weight: 65.7 kg (144 lb 13.5 oz)  Body mass index is 27.37 kg/m².    Intake/Output Summary (Last 24 hours) at 8/22/2022 1413  Last data filed at 8/22/2022 1200  Gross per 24 hour   Intake 600 ml   Output --   Net 600 ml      Physical Exam  Vitals and nursing note reviewed.   Constitutional:       General: She is not in acute distress.     Appearance: She is overweight. She is ill-appearing. She is not toxic-appearing.   HENT:      Head: Normocephalic and atraumatic.      Mouth/Throat:      Dentition: Abnormal dentition.   Cardiovascular:      Rate and Rhythm: Normal rate.   Pulmonary:      Effort: Pulmonary effort is normal. No respiratory distress.   Abdominal:      Palpations: Abdomen is soft.      Tenderness: There is no abdominal tenderness.   Musculoskeletal:         General: Tenderness, deformity and signs of injury present.        Arms:       Right lower leg: No edema.       Left lower leg: No edema.   Skin:     General: Skin is warm and dry.      Capillary Refill: Capillary refill takes 2 to 3 seconds.      Findings: Erythema and lesion present.   Neurological:      Mental Status: Mental status is at baseline.      Motor: Weakness present.   Psychiatric:         Mood and Affect: Mood is anxious.         Speech: Speech normal.         Behavior: Behavior is cooperative.       Significant Labs: All pertinent labs within the past 24 hours have been reviewed.    CMP:   Recent Labs   Lab 08/21/22  0503 08/22/22  0654   CREATININE 1.1 1.0       Significant Imaging: I have reviewed all pertinent imaging results/findings within the past 24 hours.  CT: I have reviewed all pertinent results/findings within the past 24 hours and my personal findings are:  fracture deformity seen in setting of osteomylitis

## 2022-08-22 NOTE — PLAN OF CARE
Aox4. Able to verbalize needs & follow commands. Calm & cooperative throughout shift.   POC reviewed with pt. Interventions implemented as appropriate.    VSS; SR on tele-monitor.  On room air.    PIV patent. Integrity maintained.  Receiving IV abx. No adverse reactions noted.  No c/o pain.    Continent of b/b. Up to toilet.    Ambulates w/ stand-by assist. Activity ad camelia. Frequent position changes encouraged. Able to reposition in bed independently.  Educated on s/sx of pressure injury;  verbalized understanding.  NADN. Resting quietly in bed.   Free of falls. Hourly rounding complete.   All safety measures remain in place. SR up x2; bed low & locked. Call light w/in reach.   Will continue to monitor throughout shift.

## 2022-08-22 NOTE — ASSESSMENT & PLAN NOTE
Bilateral foot infection noted  Right dorsum - purulence expressed  Will need to cover for MRSA  Left 2nd digit hyperpigmentation  Will obtain xray, may need CT to r/o osteo  Cont vanc    8/21  Continue intravenous antibiotic(s)   Infectious disease consulted for recommendations   Elevated inflammatory markers  Afebrile   Mri pending but has aicd  Ct foot alternative     8/22  Declined podiatry's confirmatory biopsy requiring amputation  Ct foot with concerns for osteo  Unable to get mri bc of AICD  Infectious disease consulted for intravenous antibiotic(s)   Many allergies and prolonged QTc

## 2022-08-22 NOTE — PLAN OF CARE
Discharge education reviewed with patient and family member. Questions answered as of now. Telemetry and PIV removed per order, PICC in right upper arm remain in for IV antibiotics. Patient remained free from falls during shift. Transport requested, patient discharged with personal belongings.

## 2022-08-22 NOTE — PROGRESS NOTES
Pharmacokinetic Assessment Follow Up: IV Vancomycin    Vancomycin serum concentration assessment(s):    The random level was drawn correctly and can be used to guide therapy at this time. The measurement is below the desired definitive target range of 15 to 20 mcg/mL.    Vancomycin Regimen Plan:    Will give a 15mg/kg= 1000 mg every 24 hours and get another random level in 23 hours.   Re-dose when the random level is less than 20 mcg/mL, next level to be drawn at 0930 on 8/23    Drug levels (last 3 results):  Recent Labs   Lab Result Units 08/21/22  0503 08/22/22  0654   Vancomycin, Random ug/mL 12.3 13.6       Pharmacy will continue to follow and monitor vancomycin.    Please contact pharmacy at extension [phone] for questions regarding this assessment.    Thank you for the consult,   Shobha Cadet       Patient brief summary:  Marysol Yancey is a 89 y.o. female initiated on antimicrobial therapy with IV Vancomycin for treatment of bone/joint infection    The patient's current regimen is pulse dosing based on random levels    Drug Allergies:   Review of patient's allergies indicates:   Allergen Reactions    Zetia [ezetimibe]     Atorvastatin     Avelox [moxifloxacin]     Erythromycin     Lisinopril     Norvasc [amlodipine]     Pcn [penicillins]     Statins-hmg-coa reductase inhibitors        Actual Body Weight:   65.7kg     Renal Function:   Estimated Creatinine Clearance: 33.1 mL/min (based on SCr of 1 mg/dL).,     Dialysis Method (if applicable):  N/A    CBC (last 72 hours):  Recent Labs   Lab Result Units 08/19/22 2139 08/20/22  0510   WBC K/uL 25.94* 23.71*   Hemoglobin g/dL 15.3 14.9   Hematocrit % 46.7 48.6*   Platelets K/uL 209 251   Gran % % 90.6* 91.1*   Lymph % % 2.2* 4.3*   Mono % % 5.5 3.6*   Eosinophil % % 0.1 0.1   Basophil % % 0.5 0.4   Differential Method  Automated Automated       Metabolic Panel (last 72 hours):  Recent Labs   Lab Result Units 08/19/22 2139 08/20/22  0035 08/20/22  0841  08/21/22  0503 08/22/22  0654   Sodium mmol/L 140  --  142  --   --    Potassium mmol/L 3.4*  --  4.3  --   --    Chloride mmol/L 106  --  104  --   --    CO2 mmol/L 17*  --  22*  --   --    Glucose mg/dL 119*  --  98  --   --    Glucose, UA   --  Negative  --   --   --    BUN mg/dL 29*  --  33*  --   --    Creatinine mg/dL 1.2  --  1.3 1.1 1.0   Albumin g/dL 3.7  --  3.6  --   --    Total Bilirubin mg/dL 0.7  --  0.9  --   --    Alkaline Phosphatase U/L 115  --  105  --   --    AST U/L 53*  --  46*  --   --    ALT U/L 33  --  34  --   --        Vancomycin Administrations:  vancomycin given in the last 96 hours                     vancomycin in dextrose 5 % 1 gram/250 mL IVPB 1,000 mg (mg) 1,000 mg New Bag 08/22/22 1037    vancomycin 750 mg in dextrose 5 % 250 mL IVPB (ready to mix system) (mg) 750 mg New Bag 08/21/22 0851    vancomycin 1.5 g in dextrose 5 % 250 mL IVPB (ready to mix) (mg) 1,500 mg New Bag 08/20/22 0622                    Microbiologic Results:  Microbiology Results (last 7 days)       Procedure Component Value Units Date/Time    Aerobic culture [[id number]] Collected: 08/20/22 1700    Order Status: Completed Specimen: Abscess from Foot, Right Updated: 08/22/22 0730     Aerobic Bacterial Culture No growth    Urine culture [[id number]]  (Abnormal) Collected: 08/20/22 0035    Order Status: Completed Specimen: Urine Updated: 08/21/22 2104     Urine Culture, Routine STAPHYLOCOCCUS AUREUS  >100,000cfu/ml  Susceptibility pending  No other significant isolate      Narrative:      Specimen Source->Urine    Blood culture [[id number]] Collected: 08/20/22 0511    Order Status: Completed Specimen: Blood Updated: 08/21/22 1812     Blood Culture, Routine No Growth to date      No Growth to date    Blood culture [[id number]] Collected: 08/20/22 0511    Order Status: Completed Specimen: Blood Updated: 08/21/22 1812     Blood Culture, Routine No Growth to date      No Growth to date

## 2022-08-22 NOTE — ASSESSMENT & PLAN NOTE
"Leukocytosis and tachycardia noted  Chest xray and abd ct unremarkable  Possible source of infection is skin and possibly bone  Will cover empirically with cefepime and vanc  Blood cultures  U/a pending     This patient does have evidence of infective focus  My overall impression is sepsis. Vital signs were reviewed and noted in progress note.  Antibiotics given-   Antibiotics (From admission, onward)            Start     Stop Route Frequency Ordered    08/20/22 0154  vancomycin - pharmacy to dose  (vancomycin IVPB)        "And" Linked Group Details    -- IV pharmacy to manage frequency 08/20/22 0054    08/20/22 0115  cefepime in dextrose 5 % IVPB 2 g         -- IV Every 24 hours (non-standard times) 08/19/22 2341        Cultures were taken-   Microbiology Results (last 7 days)     Procedure Component Value Units Date/Time    Aerobic culture [[id number]] Collected: 08/20/22 1700    Order Status: Completed Specimen: Abscess from Foot, Right Updated: 08/22/22 0730     Aerobic Bacterial Culture No growth    Urine culture [[id number]]  (Abnormal) Collected: 08/20/22 0035    Order Status: Completed Specimen: Urine Updated: 08/21/22 2104     Urine Culture, Routine STAPHYLOCOCCUS AUREUS  >100,000cfu/ml  Susceptibility pending  No other significant isolate      Narrative:      Specimen Source->Urine    Blood culture [[id number]] Collected: 08/20/22 0511    Order Status: Completed Specimen: Blood Updated: 08/21/22 1812     Blood Culture, Routine No Growth to date      No Growth to date    Blood culture [[id number]] Collected: 08/20/22 0511    Order Status: Completed Specimen: Blood Updated: 08/21/22 1812     Blood Culture, Routine No Growth to date      No Growth to date        Latest lactate reviewed, they are-  Recent Labs   Lab 08/19/22  2351   LACTATE 1.8       Organ dysfunction indicated by Acute respiratory failure  Source- foot    Source control Achieved by- intravenous antibiotic(s) and I&D      "

## 2022-08-22 NOTE — NURSING
Pt c/o having anxiety attack & that she cannot stop shaking. Pt has been shaky off & on the past two nights. Secure chat sent to .

## 2022-08-22 NOTE — PLAN OF CARE
Patient has been ordered home IV antibiotics.  Referral sent to [facility]/angelica Arriaga notified.       08/22/22 0957   Post-Acute Status   Post-Acute Authorization IV Infusion   IV Infusion Status Referral(s) sent   Discharge Plan   Discharge Plan A [facility]

## 2022-08-22 NOTE — DISCHARGE SUMMARY
[place] - Telemetry ([facility] Medicine  Discharge Summary      Patient Name: Marysol Yancey  MRN: [id number]  Patient Class: IP- Inpatient  Admission Date: 8/19/2022  Hospital Length of Stay: 3 days  Discharge Date and Time:  08/22/2022 3:44 PM  Attending Physician: Eliseo Spence MD   Discharging Provider: Eliseo Spence MD  Primary Care Provider: Bruno Nugent MD      HPI:   Patient is a 89 y.o.  female with a PMHx of MI, CHF, HLD, and defibrillator implant in L chest (installed in 2013) who presents to the Emergency Department for evaluation of R-side abdominal pain. Patient reported symptoms occurring earlier today. Abd pain is exacerbated by deep inspiration. Reports having nausea and other associated symptoms include dizziness, sob, and tremors. States last bowel movement was earlier today. Denies any other issues.     In the ED, labs concerning for troponin 0.317, , WBC: 25.9. CT abd did not reveal source of infection or abdominal pain. Chest xray unremarkable. Cards consulted and heparin started. HM consulted and patient admitted.       * No surgery found *      Hospital Course:   8/21 admitted for nstemi. Cardiology consulted and elevated troponin(s) related to demand ischemia from foot infection. Podiatry consulted and performed I&D at bedside of right foot. Podiatry recommends treating as cellulitis and does not feel osteo. Infectious disease consulted for antibiotic(s). Mri pending. Patient with aicd.  8/22 anxious and sleep disturbance overnight. Social and infectious disease consulted for home intravenous antibiotic(s) for empiric treatment of osteomyelitis. Tolerated picc placement    Patient agreeable to intravenous antibiotic(s) for osteomyelitis per infectious disease recommendations, vanc and cef daily for 6 weeks.    Patient seen and evaluated by me. Patient was determined to be suitable for d/c. Patient deemed stable for discharge to home with homehealth  "physical/occupational therapy and nurse practitioner to visit.          Goals of Care Treatment Preferences:  Code Status: Partial Code      Consults:   Consults (From admission, onward)        Status Ordering Provider     Inpatient consult to Social Work/Case Management  Once        Provider:  (Not yet assigned)    Acknowledged MARCELINO JONES     Inpatient consult to Social Work  Once        Provider:  (Not yet assigned)    Acknowledged MARCELINO JONES     Inpatient consult to Infectious Diseases  Once        Provider:  Waldemar Hernandez MD    Acknowledged MARCELINO JONES     Inpatient consult to Podiatry  Once        Provider:  Meri Daly DPM    Completed CATHERINE KEARNEY     Inpatient consult to Cardiology  Once        Provider:  Chan Vivar MD    Completed MARCELINO JONES     Pharmacy to dose Vancomycin consult  Once        Provider:  (Not yet assigned)   "And" Linked Group Details    Acknowledged LUCINDA GARCIA          No new Assessment & Plan notes have been filed under this hospital service since the last note was generated.  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 Schedule an appointment as soon as possible for a visit in 3 day(s).    Specialty: Internal Medicine  Why: hospital follow up  Contact information:  [address] " [id number]  [phone]             Waldemar Hernandez MD. Schedule an appointment as soon as possible for a visit in 1 month(s).    Specialties: Infectious Diseases, Hospitalist  Why: hospital follow up  Contact information:  [address] [place] [address] [address] [id number]  [phone]             Meri Daly DPM. Schedule an appointment as soon as possible for a visit in 1 week(s).    Specialty: Podiatry  Why: hospital follow up  Contact information:  [address] [place] [address] [address] [id number]  [phone]                       Patient Instructions:      Ambulatory referral/consult to Home Health   Standing Status: Future   Referral Priority: Routine Referral Type: Home Health   Referral Reason: Specialty Services Required   Requested Specialty: Home Health Services   Number of Visits Requested: 1     Ambulatory referral/consult to [facility]   Standing Status: Future   Referral Priority: Routine Referral Type: Consultation   Referral Reason: Specialty Services Required   Number of Visits Requested: 1     Diet Cardiac     Activity as tolerated       Significant Diagnostic Studies: Labs:   CMP   Recent Labs   Lab 08/21/22  0503 08/22/22  0654   CREATININE 1.1 1.0   , CBC No results for input(s): WBC, HGB, HCT, PLT in the last 48 hours., INR   Lab Results   Component Value Date    INR 1.1 08/19/2022    INR 1.1 01/19/2013    INR 1.1 01/18/2013   , Troponin   Recent Labs   Lab 08/19/22  2139 08/19/22  2348 08/20/22  0510   TROPONINI 0.317* 0.403* 0.360*    and All labs within the past 24 hours have been reviewed  Microbiology:   Blood Culture   Lab Results   Component Value Date    LABBLOO No Growth to date 08/20/2022    LABBLOO No Growth to date 08/20/2022    LABBLOO No Growth to date 08/20/2022    LABBLOO No Growth to date 08/20/2022    and Wound Culture: no growth  Urine culture growing ecoli  Radiology: X-Ray: CXR: portable and xray foot, right  CT scan: CT ABDOMEN PELVIS WITHOUT  CONTRAST:   Results for orders placed or performed during the hospital encounter of 08/19/22   CT Abdomen Pelvis  Without Contrast    Narrative    EXAMINATION:  CT ABDOMEN PELVIS WITHOUT CONTRAST    CLINICAL HISTORY:  Abdominal abscess/infection suspected;    TECHNIQUE:  Low dose axial images, sagittal and coronal reformations were obtained from the lung bases to the pubic symphysis, 30 mL of oral Omnipaque 350 was administered..    COMPARISON:  None    FINDINGS:  Heart: Pacemaker lead.  No pericardial effusion as far seen.    Lung Bases: Right anterior lung base pulmonary nodules measuring up to 9 mm.  Mild subsegmental atelectasis.    Liver: Hepatomegaly with no focal hepatic lesions.    Gallbladder: No calcified gallstones.  Slight hyperdensity may relate to sludge    Bile Ducts: No evidence of dilated ducts.    Pancreas: No mass or peripancreatic fat stranding.    Spleen: Focal splenic calcification.    Adrenals: Unremarkable.    Kidneys/ Ureters: Left renal cyst measuring 2.7 x 2.1 cm.    Bladder: No evidence of wall thickening.    Reproductive organs: Status post hysterectomy    GI Tract/Mesentery: No evidence of bowel obstruction or inflammation. No secondary signs of appendicitis.  Colonic diverticulosis.  Hiatal hernia    Peritoneal Space: No ascites. No free air.  16 x 16 mm peripheral low attending partially calcified lesion in the left mid abdomen may relate to epiploic appendagitis of uncertain clinical significance.  Correlate to site of pain.  See coronal image 45, series 601    Retroperitoneum: No significant adenopathy.    Abdominal wall: Fat containing umbilical hernia.    Vasculature: No aneurysm.    Bones: No acute fracture.      Impression    16 x 16 mm peripheral low attending partially calcified lesion in the left mid abdomen may relate to epiploic appendagitis of uncertain clinical significance. Correlate to site of pain. See coronal image 45, series 601    Right anterior lung base pulmonary  nodules measuring up to 9 mm.    Atherosclerotic changes    Small hiatal hernia; renal cyst; focal splenic calcification, colonic diverticulosis    No evidence for abscess    All CT scans   are performed using dose optimization techniques including the following: automated exposure control; adjustment of the mA and/or kV; use of iterative reconstruction technique.  Dose modulation was employed for ALARA by means of: Automated exposure control; adjustment of the mA and/or kV according to patient size (this includes techniques or standardized protocols for targeted exams where dose is matched to indication/reason for exam; i.e. extremities or head); and/or use of iterative reconstructive technique.      Electronically signed by: Jason Garsia  Date:    08/19/2022  Time:    21:28    and CT foot, right  Cardiac Graphics: Echocardiogram:   Transthoracic echo (TTE) complete (Cupid Only):   Results for orders placed or performed during the hospital encounter of 08/19/22   Echo Saline Bubble? No   Result Value Ref Range    BSA 1.73 m2    TDI SEPTAL 0.04 m/s    LV LATERAL E/E' RATIO 18.60 m/s    LV SEPTAL E/E' RATIO 23.25 m/s    LA WIDTH 3.80 cm    IVC diameter 1.54 cm    Left Ventricular Outflow Tract Mean Velocity 0.83 cm/s    Left Ventricular Outflow Tract Mean Gradient 2.79 mmHg    TV mean gradient 19 mmHg    TDI LATERAL 0.05 m/s    LVIDd 3.31 (A) 3.5 - 6.0 cm    IVS 1.69 (A) 0.6 - 1.1 cm    Posterior Wall 1.50 (A) 0.6 - 1.1 cm    Ao root annulus 2.88 cm    LVIDs 2.41 2.1 - 4.0 cm    FS 27 28 - 44 %    LA volume 54.54 cm3    STJ 2.74 cm    Ascending aorta 2.64 cm    LV mass 198.92 g    LA size 3.50 cm    TAPSE 1.80 cm    Left Ventricle Relative Wall Thickness 0.91 cm    AV mean gradient 5 mmHg    AV valve area 2.03 cm2    AV Velocity Ratio 0.69     AV index (prosthetic) 0.72     MV valve area p 1/2 method 2.37 cm2    E/A ratio 0.75     Mean e' 0.05 m/s    E wave deceleration time 320.37 msec    IVRT 60.89 msec    LVOT  diameter 1.90 cm    LVOT area 2.8 cm2    LVOT peak pierce 0.97 m/s    LVOT peak VTI 19.90 cm    Ao peak pierec 1.40 m/s    Ao VTI 27.8 cm    RVOT peak pierce 0.60 m/s    RVOT peak VTI 12.4 cm    Mr max pierce 3.37 m/s    LVOT stroke volume 56.39 cm3    AV peak gradient 8 mmHg    PV mean gradient 0.64 mmHg    E/E' ratio 20.67 m/s    MV Peak E Pierce 0.93 m/s    TR Max Pierce 2.34 m/s    MV stenosis pressure 1/2 time 92.91 ms    MV Peak A Pierce 1.24 m/s    LV Systolic Volume 20.42 mL    LV Systolic Volume Index 12.1 mL/m2    LV Diastolic Volume 44.58 mL    LV Diastolic Volume Index 26.38 mL/m2    LA Volume Index 32.3 mL/m2    LV Mass Index 118 g/m2    RA Major Axis 3.84 cm    Left Atrium Minor Axis 4.76 cm    Left Atrium Major Axis 4.89 cm    Triscuspid Valve Regurgitation Peak Gradient 22 mmHg    RA Width 2.90 cm    Right Atrial Pressure (from IVC) 3 mmHg    EF 55 %    TV rest pulmonary artery pressure 25 mmHg    Narrative    · The left ventricle is normal in size with concentric hypertrophy and   normal systolic function.  · The estimated PA systolic pressure is 25 mmHg.  · The estimated ejection fraction is 55%.  · Indeterminate left ventricular diastolic function.  · Normal right ventricular size with normal right ventricular systolic   function.  · Normal central venous pressure (3 mmHg).  · Mild mitral regurgitation.  · There is abnormal septal wall motion consistent with right ventricular   pacemaker.          Pending Diagnostic Studies:     Procedure Component Value Units Date/Time    MRI Foot (Forefoot) Right Without Contrast [[id number]]     Order Status: Sent Lab Status: No result          Medications:  Reconciled Home Medications:      Medication List      START taking these medications    cefepime in dextrose 5 % 2 gram/50 mL Pgbk  Commonly known as: MAXIPIME  Inject 50 mLs (2 g total) into the vein once daily.        CHANGE how you take these medications    aspirin 325 MG EC tablet  Commonly known as: ECOTRIN  Take 325 mg by  mouth once daily.  What changed: Another medication with the same name was removed. Continue taking this medication, and follow the directions you see here.        CONTINUE taking these medications    ALEVE 220 mg Cap  Generic drug: naproxen sodium  Take 1 tablet by mouth every evening. Aleve PM     carvediloL 6.25 MG tablet  Commonly known as: COREG  Take 1 tablet (6.25 mg total) by mouth 2 (two) times daily with meals.     clonazePAM 0.5 MG tablet  Commonly known as: KlonoPIN  Take 0.5 mg by mouth every evening.     cloNIDine 0.2 MG tablet  Commonly known as: CATAPRES  Take 1 tablet by mouth every evening.     FISH OIL CONCENTRATE ORAL  Take 1,000 mg by mouth once daily.     FLUoxetine 20 MG tablet  Take 20 mg by mouth once daily.     levothyroxine 75 MCG tablet  Commonly known as: SYNTHROID  Take 25 mcg by mouth once daily.        STOP taking these medications    sertraline 25 MG tablet  Commonly known as: ZOLOFT     triamterene-hydrochlorothiazide 37.5-25 mg 37.5-25 mg per tablet  Commonly known as: MAXZIDE-25            Indwelling Lines/Drains at time of discharge:   Lines/Drains/Airways     Peripherally Inserted Central Catheter Line  Duration           PICC Double Lumen 08/22/22 1032 right brachial <1 day                Time spent on the discharge of patient: 41 minutes         Eliseo Spence MD  Department of Hospital Medicine  [facility] - Tele[facility] ([facility])

## 2022-08-22 NOTE — PROGRESS NOTES
[facility] Medicine  Progress Note    Patient Name: Marysol Yancey  MRN: [id number]  Patient Class: IP- Inpatient   Admission Date: 8/19/2022  Length of Stay: 3 days  Attending Physician: Eliseo Spence MD  Primary Care Provider: Bruno Nugent MD        Subjective:     Principal Problem:Osteomyelitis of right foot        HPI:  Patient is a 89 y.o.  female with a PMHx of MI, CHF, HLD, and defibrillator implant in L chest (installed in 2013) who presents to the Emergency Department for evaluation of R-side abdominal pain. Patient reported symptoms occurring earlier today. Abd pain is exacerbated by deep inspiration. Reports having nausea and other associated symptoms include dizziness, sob, and tremors. States last bowel movement was earlier today. Denies any other issues.     In the ED, labs concerning for troponin 0.317, , WBC: 25.9. CT abd did not reveal source of infection or abdominal pain. Chest xray unremarkable. Cards consulted and heparin started. HM consulted and patient admitted.       Overview/Hospital Course:  8/21 admitted for nstemi. Cardiology consulted and elevated troponin(s) related to demand ischemia from foot infection. Podiatry consulted and performed I&D at bedside of right foot. Podiatry recommends treating as cellulitis and does not feel osteo. Infectious disease consulted for antibiotic(s). Mri pending. Patient with aicd.  8/22 anxious and sleep disturbance overnight. Social and infectious disease consulted for home intravenous antibiotic(s) for empiric treatment of osteomyelitis. Tolerated picc placement      Interval History: See hospital course for today      Review of Systems   Constitutional:  Positive for activity change, appetite change (improved) and fatigue. Negative for fever.   HENT:  Positive for dental problem.    Respiratory:  Negative for shortness of breath.    Cardiovascular:  Negative for chest pain and leg swelling.    Gastrointestinal:  Negative for abdominal pain, diarrhea, nausea and vomiting.   Musculoskeletal:  Positive for arthralgias and myalgias.   Skin:  Positive for color change and wound.   Neurological:  Positive for weakness.   Psychiatric/Behavioral:  Positive for sleep disturbance (improved). The patient is nervous/anxious (improved).    Objective:     Vital Signs (Most Recent):  Temp: 97.9 °F (36.6 °C) (08/22/22 1231)  Pulse: 68 (08/22/22 1321)  Resp: 18 (08/22/22 1231)  BP: (!) 140/61 (08/22/22 1231)  SpO2: 96 % (08/22/22 1231)   Vital Signs (24h Range):  Temp:  [97.9 °F (36.6 °C)-99 °F (37.2 °C)] 97.9 °F (36.6 °C)  Pulse:  [66-85] 68  Resp:  [16-20] 18  SpO2:  [93 %-98 %] 96 %  BP: (116-213)/(57-91) 140/61     Weight: 65.7 kg (144 lb 13.5 oz)  Body mass index is 27.37 kg/m².    Intake/Output Summary (Last 24 hours) at 8/22/2022 1413  Last data filed at 8/22/2022 1200  Gross per 24 hour   Intake 600 ml   Output --   Net 600 ml      Physical Exam  Vitals and nursing note reviewed.   Constitutional:       General: She is not in acute distress.     Appearance: She is overweight. She is ill-appearing. She is not toxic-appearing.   HENT:      Head: Normocephalic and atraumatic.      Mouth/Throat:      Dentition: Abnormal dentition.   Cardiovascular:      Rate and Rhythm: Normal rate.   Pulmonary:      Effort: Pulmonary effort is normal. No respiratory distress.   Abdominal:      Palpations: Abdomen is soft.      Tenderness: There is no abdominal tenderness.   Musculoskeletal:         General: Tenderness, deformity and signs of injury present.        Arms:       Right lower leg: No edema.      Left lower leg: No edema.   Skin:     General: Skin is warm and dry.      Capillary Refill: Capillary refill takes 2 to 3 seconds.      Findings: Erythema and lesion present.   Neurological:      Mental Status: Mental status is at baseline.      Motor: Weakness present.   Psychiatric:         Mood and Affect: Mood is anxious.     "     Speech: Speech normal.         Behavior: Behavior is cooperative.       Significant Labs: All pertinent labs within the past 24 hours have been reviewed.    CMP:   Recent Labs   Lab 08/21/22  0503 08/22/22  0654   CREATININE 1.1 1.0       Significant Imaging: I have reviewed all pertinent imaging results/findings within the past 24 hours.  CT: I have reviewed all pertinent results/findings within the past 24 hours and my personal findings are:  fracture deformity seen in setting of osteomylitis      Assessment/Plan:      * Osteomyelitis of right foot  Bilateral foot infection noted  Right dorsum - purulence expressed  Will need to cover for MRSA  Left 2nd digit hyperpigmentation  Will obtain xray, may need CT to r/o osteo  Cont vanc    8/21  Continue intravenous antibiotic(s)   Infectious disease consulted for recommendations   Elevated inflammatory markers  Afebrile   Mri pending but has aicd  Ct foot alternative     8/22  Declined podiatry's confirmatory biopsy requiring amputation  Ct foot with concerns for osteo  Unable to get mri bc of AICD  Infectious disease consulted for intravenous antibiotic(s)   Many allergies and prolonged QTc    Severe sepsis  Leukocytosis and tachycardia noted  Chest xray and abd ct unremarkable  Possible source of infection is skin and possibly bone  Will cover empirically with cefepime and vanc  Blood cultures  U/a pending     This patient does have evidence of infective focus  My overall impression is sepsis. Vital signs were reviewed and noted in progress note.  Antibiotics given-   Antibiotics (From admission, onward)            Start     Stop Route Frequency Ordered    08/20/22 0154  vancomycin - pharmacy to dose  (vancomycin IVPB)        "And" Linked Group Details    -- IV pharmacy to manage frequency 08/20/22 0054    08/20/22 0115  cefepime in dextrose 5 % IVPB 2 g         -- IV Every 24 hours (non-standard times) 08/19/22 2341        Cultures were taken-   Microbiology " Results (last 7 days)     Procedure Component Value Units Date/Time    Aerobic culture [[id number]] Collected: 08/20/22 1700    Order Status: Completed Specimen: Abscess from Foot, Right Updated: 08/22/22 0730     Aerobic Bacterial Culture No growth    Urine culture [[id number]]  (Abnormal) Collected: 08/20/22 0035    Order Status: Completed Specimen: Urine Updated: 08/21/22 2104     Urine Culture, Routine STAPHYLOCOCCUS AUREUS  >100,000cfu/ml  Susceptibility pending  No other significant isolate      Narrative:      Specimen Source->Urine    Blood culture [[id number]] Collected: 08/20/22 0511    Order Status: Completed Specimen: Blood Updated: 08/21/22 1812     Blood Culture, Routine No Growth to date      No Growth to date    Blood culture [[id number]] Collected: 08/20/22 0511    Order Status: Completed Specimen: Blood Updated: 08/21/22 1812     Blood Culture, Routine No Growth to date      No Growth to date        Latest lactate reviewed, they are-  Recent Labs   Lab 08/19/22  2351   LACTATE 1.8       Organ dysfunction indicated by Acute respiratory failure  Source- foot    Source control Achieved by- intravenous antibiotic(s) and I&D        Hypertension  Resume home meds  Hydralazine prn   Controlled     ICD (implantable cardioverter-defibrillator) in place        CAD (coronary artery disease)  Resume home meds      VTE Risk Mitigation (From admission, onward)    None          Discharge Planning   IMTIAZ: 8/22/2022     Code Status: Partial Code   Is the patient medically ready for discharge?:     Reason for patient still in hospital (select all that apply): Patient trending condition, Laboratory test, Treatment, Imaging, Consult recommendations and Pending disposition  Discharge Plan A: Home Health                  Eliseo Spence MD  Department of Hospital Medicine   [place] - Telemetry ([facility])

## 2022-08-22 NOTE — DISCHARGE INSTRUCTIONS
You have been started on new medication(s) from this hospitalization. Please take as directed and schedule hospital follow up appointment with your primary providers.    Homehealth with physical/occupational therapy order has been ordered. Someone will be in contact.    A nurse practitioner may be contacting you to assess your status post-hospitalization.

## 2022-08-23 LAB — BACTERIA UR CULT: ABNORMAL

## 2022-08-24 LAB — BACTERIA SPEC AEROBE CULT: ABNORMAL

## 2022-08-25 ENCOUNTER — LAB VISIT (OUTPATIENT)
Dept: LAB | Facility: HOSPITAL | Age: 87
End: 2022-08-25
Attending: INTERNAL MEDICINE
Payer: MEDICARE

## 2022-08-25 DIAGNOSIS — L03.116 CELLULITIS OF LEFT FOOT: ICD-10-CM

## 2022-08-25 DIAGNOSIS — L02.415 ABSCESS OF RIGHT HIP: ICD-10-CM

## 2022-08-25 DIAGNOSIS — A41.9 SYSTEMIC INFECTION: Primary | ICD-10-CM

## 2022-08-25 DIAGNOSIS — M86.8X7 OSTEOMYELITIS OF FOREFOOT: ICD-10-CM

## 2022-08-25 LAB
ALBUMIN SERPL BCP-MCNC: 3.1 G/DL (ref 3.5–5.2)
ALP SERPL-CCNC: 69 U/L (ref 55–135)
ALT SERPL W/O P-5'-P-CCNC: 13 U/L (ref 10–44)
ANION GAP SERPL CALC-SCNC: 9 MMOL/L (ref 8–16)
AST SERPL-CCNC: 17 U/L (ref 10–40)
BACTERIA BLD CULT: NORMAL
BACTERIA BLD CULT: NORMAL
BILIRUB SERPL-MCNC: 0.5 MG/DL (ref 0.1–1)
BUN SERPL-MCNC: 39 MG/DL (ref 8–23)
CALCIUM SERPL-MCNC: 9.5 MG/DL (ref 8.7–10.5)
CHLORIDE SERPL-SCNC: 106 MMOL/L (ref 95–110)
CO2 SERPL-SCNC: 21 MMOL/L (ref 23–29)
CREAT SERPL-MCNC: 1.3 MG/DL (ref 0.5–1.4)
EST. GFR  (NO RACE VARIABLE): 39 ML/MIN/1.73 M^2
GLUCOSE SERPL-MCNC: 101 MG/DL (ref 70–110)
POTASSIUM SERPL-SCNC: 4.9 MMOL/L (ref 3.5–5.1)
PROT SERPL-MCNC: 6.8 G/DL (ref 6–8.4)
SODIUM SERPL-SCNC: 136 MMOL/L (ref 136–145)
VANCOMYCIN TROUGH SERPL-MCNC: 21.6 UG/ML (ref 10–22)

## 2022-08-25 PROCEDURE — 80202 ASSAY OF VANCOMYCIN: CPT | Performed by: INTERNAL MEDICINE

## 2022-08-25 PROCEDURE — 80053 COMPREHEN METABOLIC PANEL: CPT | Performed by: INTERNAL MEDICINE

## 2022-08-25 PROCEDURE — 36415 COLL VENOUS BLD VENIPUNCTURE: CPT | Performed by: INTERNAL MEDICINE

## 2022-08-26 ENCOUNTER — PES CALL (OUTPATIENT)
Dept: ADMINISTRATIVE | Facility: CLINIC | Age: 87
End: 2022-08-26
Payer: MEDICARE

## 2022-08-29 ENCOUNTER — PES CALL (OUTPATIENT)
Dept: ADMINISTRATIVE | Facility: CLINIC | Age: 87
End: 2022-08-29
Payer: MEDICARE

## 2022-08-29 RX ORDER — MINOCYCLINE HYDROCHLORIDE 100 MG/1
100 CAPSULE ORAL EVERY 12 HOURS
Qty: 84 CAPSULE | Refills: 0 | Status: SHIPPED | OUTPATIENT
Start: 2022-08-29 | End: 2022-09-06

## 2022-09-03 ENCOUNTER — HOSPITAL ENCOUNTER (EMERGENCY)
Facility: HOSPITAL | Age: 87
Discharge: LEFT BEFORE DISCHARGE | End: 2022-09-03
Attending: EMERGENCY MEDICINE
Payer: MEDICARE

## 2022-09-03 VITALS
RESPIRATION RATE: 19 BRPM | OXYGEN SATURATION: 96 % | WEIGHT: 147 LBS | TEMPERATURE: 98 F | HEIGHT: 61 IN | DIASTOLIC BLOOD PRESSURE: 91 MMHG | SYSTOLIC BLOOD PRESSURE: 211 MMHG | HEART RATE: 67 BPM | BODY MASS INDEX: 27.75 KG/M2

## 2022-09-03 DIAGNOSIS — I10 HYPERTENSION, UNSPECIFIED TYPE: ICD-10-CM

## 2022-09-03 DIAGNOSIS — T50.905A ADVERSE EFFECT OF DRUG, INITIAL ENCOUNTER: Primary | ICD-10-CM

## 2022-09-03 DIAGNOSIS — R21 RASH: ICD-10-CM

## 2022-09-03 LAB
ALBUMIN SERPL BCP-MCNC: 3.7 G/DL (ref 3.5–5.2)
ALP SERPL-CCNC: 89 U/L (ref 55–135)
ALT SERPL W/O P-5'-P-CCNC: 10 U/L (ref 10–44)
ANION GAP SERPL CALC-SCNC: 12 MMOL/L (ref 8–16)
AST SERPL-CCNC: 14 U/L (ref 10–40)
BASOPHILS # BLD AUTO: 0.22 K/UL (ref 0–0.2)
BASOPHILS NFR BLD: 1.6 % (ref 0–1.9)
BILIRUB SERPL-MCNC: 0.3 MG/DL (ref 0.1–1)
BNP SERPL-MCNC: 143 PG/ML (ref 0–99)
BUN SERPL-MCNC: 28 MG/DL (ref 8–23)
CALCIUM SERPL-MCNC: 9.7 MG/DL (ref 8.7–10.5)
CHLORIDE SERPL-SCNC: 108 MMOL/L (ref 95–110)
CO2 SERPL-SCNC: 19 MMOL/L (ref 23–29)
CREAT SERPL-MCNC: 1.2 MG/DL (ref 0.5–1.4)
CRP SERPL-MCNC: 25.1 MG/L (ref 0–8.2)
DIFFERENTIAL METHOD: ABNORMAL
EOSINOPHIL # BLD AUTO: 0.7 K/UL (ref 0–0.5)
EOSINOPHIL NFR BLD: 5.2 % (ref 0–8)
ERYTHROCYTE [DISTWIDTH] IN BLOOD BY AUTOMATED COUNT: 14.7 % (ref 11.5–14.5)
ERYTHROCYTE [SEDIMENTATION RATE] IN BLOOD BY WESTERGREN METHOD: 29 MM/HR (ref 0–20)
EST. GFR  (NO RACE VARIABLE): 43 ML/MIN/1.73 M^2
GLUCOSE SERPL-MCNC: 91 MG/DL (ref 70–110)
HCT VFR BLD AUTO: 42.7 % (ref 37–48.5)
HGB BLD-MCNC: 13.3 G/DL (ref 12–16)
IMM GRANULOCYTES # BLD AUTO: 0.08 K/UL (ref 0–0.04)
IMM GRANULOCYTES NFR BLD AUTO: 0.6 % (ref 0–0.5)
LACTATE SERPL-SCNC: 1.1 MMOL/L (ref 0.5–2.2)
LYMPHOCYTES # BLD AUTO: 2.4 K/UL (ref 1–4.8)
LYMPHOCYTES NFR BLD: 16.8 % (ref 18–48)
MCH RBC QN AUTO: 28.7 PG (ref 27–31)
MCHC RBC AUTO-ENTMCNC: 31.1 G/DL (ref 32–36)
MCV RBC AUTO: 92 FL (ref 82–98)
MONOCYTES # BLD AUTO: 1 K/UL (ref 0.3–1)
MONOCYTES NFR BLD: 7.4 % (ref 4–15)
NEUTROPHILS # BLD AUTO: 9.6 K/UL (ref 1.8–7.7)
NEUTROPHILS NFR BLD: 68.4 % (ref 38–73)
NRBC BLD-RTO: 0 /100 WBC
PLATELET # BLD AUTO: 340 K/UL (ref 150–450)
PMV BLD AUTO: 9.4 FL (ref 9.2–12.9)
POTASSIUM SERPL-SCNC: 4.3 MMOL/L (ref 3.5–5.1)
PROT SERPL-MCNC: 7.7 G/DL (ref 6–8.4)
RBC # BLD AUTO: 4.64 M/UL (ref 4–5.4)
SODIUM SERPL-SCNC: 139 MMOL/L (ref 136–145)
WBC # BLD AUTO: 14.03 K/UL (ref 3.9–12.7)

## 2022-09-03 PROCEDURE — 86140 C-REACTIVE PROTEIN: CPT | Performed by: EMERGENCY MEDICINE

## 2022-09-03 PROCEDURE — 87040 BLOOD CULTURE FOR BACTERIA: CPT | Performed by: EMERGENCY MEDICINE

## 2022-09-03 PROCEDURE — 99284 EMERGENCY DEPT VISIT MOD MDM: CPT | Mod: 25

## 2022-09-03 PROCEDURE — 93005 ELECTROCARDIOGRAM TRACING: CPT

## 2022-09-03 PROCEDURE — 96374 THER/PROPH/DIAG INJ IV PUSH: CPT

## 2022-09-03 PROCEDURE — 25000003 PHARM REV CODE 250: Performed by: EMERGENCY MEDICINE

## 2022-09-03 PROCEDURE — 93010 EKG 12-LEAD: ICD-10-PCS | Mod: ,,, | Performed by: INTERNAL MEDICINE

## 2022-09-03 PROCEDURE — 85651 RBC SED RATE NONAUTOMATED: CPT | Performed by: EMERGENCY MEDICINE

## 2022-09-03 PROCEDURE — 80053 COMPREHEN METABOLIC PANEL: CPT | Performed by: EMERGENCY MEDICINE

## 2022-09-03 PROCEDURE — 83605 ASSAY OF LACTIC ACID: CPT | Performed by: EMERGENCY MEDICINE

## 2022-09-03 PROCEDURE — 96375 TX/PRO/DX INJ NEW DRUG ADDON: CPT

## 2022-09-03 PROCEDURE — 83880 ASSAY OF NATRIURETIC PEPTIDE: CPT | Performed by: EMERGENCY MEDICINE

## 2022-09-03 PROCEDURE — 93010 ELECTROCARDIOGRAM REPORT: CPT | Mod: ,,, | Performed by: INTERNAL MEDICINE

## 2022-09-03 PROCEDURE — 85025 COMPLETE CBC W/AUTO DIFF WBC: CPT | Performed by: EMERGENCY MEDICINE

## 2022-09-03 PROCEDURE — 63600175 PHARM REV CODE 636 W HCPCS: Performed by: EMERGENCY MEDICINE

## 2022-09-03 RX ORDER — DIPHENHYDRAMINE HYDROCHLORIDE 50 MG/ML
12.5 INJECTION INTRAMUSCULAR; INTRAVENOUS
Status: COMPLETED | OUTPATIENT
Start: 2022-09-03 | End: 2022-09-03

## 2022-09-03 RX ORDER — METHYLPREDNISOLONE SOD SUCC 125 MG
125 VIAL (EA) INJECTION
Status: COMPLETED | OUTPATIENT
Start: 2022-09-03 | End: 2022-09-03

## 2022-09-03 RX ORDER — CLONIDINE HYDROCHLORIDE 0.2 MG/1
0.2 TABLET ORAL
Status: COMPLETED | OUTPATIENT
Start: 2022-09-03 | End: 2022-09-03

## 2022-09-03 RX ADMIN — METHYLPREDNISOLONE SODIUM SUCCINATE 125 MG: 125 INJECTION, POWDER, FOR SOLUTION INTRAMUSCULAR; INTRAVENOUS at 04:09

## 2022-09-03 RX ADMIN — CLONIDINE HYDROCHLORIDE 0.2 MG: 0.2 TABLET ORAL at 06:09

## 2022-09-03 RX ADMIN — DIPHENHYDRAMINE HYDROCHLORIDE 12.5 MG: 50 INJECTION, SOLUTION INTRAMUSCULAR; INTRAVENOUS at 04:09

## 2022-09-03 NOTE — ED NOTES
"Pts /110 Dr Navarrete aware and at bedside. Pt states her BP " is always high in the hospital" Denies HA or weakness. Pt states she was supposed to take home meds approx 4 hours ago.   "

## 2022-09-03 NOTE — ED PROVIDER NOTES
SCRIBE #1 NOTE: I, Patricio Villalobos, am scribing for, and in the presence of, Leon Navarrete MD. I have scribed the HPI, ROS, and PEx.     SCRIBE #2 NOTE: I, Jose young, am scribing for, and in the presence of,  Patricio Villalobos MD. I have scribed the remaining portions of the note not scribed by Scribe #1.    History     Chief Complaint   Patient presents with    Allergic Reaction     RASH THROUGHOUT ENTIRE BODY PT REPORTS SHE HAS BEEN HAVING THIS SINCE SHE WAS HOSPITALIZED LAST Friday AFTER TAKING IV meds. Pt reports she called PCP and has been taking benadryl at home but no relief.      Review of patient's allergies indicates:   Allergen Reactions    Zetia [ezetimibe]     Atorvastatin     Avelox [moxifloxacin]     Erythromycin     Lisinopril     Norvasc [amlodipine]     Pcn [penicillins]     Statins-hmg-coa reductase inhibitors          History of Present Illness     HPI    9/3/2022, 4:30 AM  History obtained from the patient      History of Present Illness: Marysol Yacney is a 89 y.o. female patient with a PMHx of basil cell carcinoma and DM who presents to the Emergency Department for evaluation of an allergic reaction which onset gradually 8 days ago. Pt began with a full body rash after hospitalization and IV medications. Pt states the rash is even in her scalp and rectum. Symptoms are constant and moderate in severity. No mitigating or exacerbating factors reported. Patient denies any fever, chills, chest pain, facial swelling, trouble swallowing, cough, and all other sxs at this time. Prior Tx includes benadryl with no relief. Pt was seen on 8/29/22 via e-visit for her osteomyelitis on right foot with I&D. The pt was given vancomycin and rocephin antibiotics for 6 days, but stopped taking them on day five because she was not metabolizing them quick enough.      Arrival mode: Personal vehicle     PCP: Bruno Nugent MD        Past Medical History:  Past Medical History:   Diagnosis Date    Abnormal  ECG 12/17/2014    AICD (automatic cardioverter/defibrillator) present 6/14/2013    CAD (coronary artery disease) 6/14/2013    Cancer     basil cell carcinoma    Cardiac arrest 6/14/2013    CHF (congestive heart failure)     CRI (chronic renal insufficiency) 6/14/2013    Depression 6/14/2013    Diabetes mellitus     Fatigue 6/14/2013    Heart block     Hyperlipidemia     Hypertension     LBBB (left bundle branch block) 12/17/2014    Mitral regurgitation 6/14/2013    Mixed hyperlipidemia 6/14/2013    Pulmonary nodule 6/14/2013    Thyroid disease     TR (tricuspid regurgitation) 6/14/2013    Ventricular fibrillation 6/14/2013       Past Surgical History:  No past surgical history on file.      Family History:  Family History   Problem Relation Age of Onset    Heart attack Father     Hyperlipidemia Father     Hypertension Father     Anemia Neg Hx     Arrhythmia Neg Hx     Asthma Neg Hx     Clotting disorder Neg Hx     Fainting Neg Hx     Heart disease Neg Hx     Heart failure Neg Hx     Hypertension Daughter        Social History:  Social History     Tobacco Use    Smoking status: Never    Smokeless tobacco: Never   Substance and Sexual Activity    Alcohol use: Yes     Alcohol/week: 7.0 standard drinks     Types: 7 Glasses of wine per week    Drug use: No    Sexual activity: Not on file        Review of Systems     Review of Systems   Constitutional:  Negative for chills and fever.   HENT:  Negative for facial swelling, sore throat and trouble swallowing.    Respiratory:  Negative for cough and shortness of breath.    Cardiovascular:  Negative for chest pain.   Gastrointestinal:  Negative for nausea.   Genitourinary:  Negative for dysuria.   Musculoskeletal:  Negative for back pain.   Skin:  Positive for rash (Full body (Allergic Reaction)).   Neurological:  Negative for weakness.   Hematological:  Does not bruise/bleed easily.   All other systems reviewed and are negative.     Physical Exam     Initial Vitals   BP  "Pulse Resp Temp SpO2   09/03/22 0437 09/03/22 0420 09/03/22 0420 09/03/22 0420 09/03/22 0700   (!) 189/94 75 18 97.8 °F (36.6 °C) 96 %      MAP       --                 Physical Exam  Nursing Notes and Vital Signs Reviewed.   Constitutional: Patient is in mild distress. Well-developed and well-nourished.  Head: Atraumatic. Normocephalic.  Eyes: PERRL. EOM intact. Conjunctivae are not pale. No scleral icterus.  ENT: Mucous membranes are moist. Oropharynx is clear and symmetric.    Neck: Supple. Full ROM. No lymphadenopathy.  Cardiovascular: Regular rate. Regular rhythm. No murmurs, rubs, or gallops. Distal pulses are 2+ and symmetric.  Pulmonary/Chest: No respiratory distress. Clear to auscultation bilaterally. No wheezing or rales.  Abdominal: Soft and non-distended.  There is no tenderness.  No rebound, guarding, or rigidity. Good bowel sounds.  Genitourinary: No CVA tenderness  Musculoskeletal: Moves all extremities. No obvious deformities. No edema. No calf tenderness.  Skin: Warm and dry. Full body diffuse macular rash sparing face, palms, and soles. Well-healed wound on right dorsal foot with minimal purulent discharge.  Neurological:  Alert, awake, and appropriate.  Normal speech.  No acute focal neurological deficits are appreciated.  Psychiatric: Normal affect. Good eye contact. Appropriate in content.     ED Course   Procedures  ED Vital Signs:  Vitals:    09/03/22 0420 09/03/22 0437 09/03/22 0639 09/03/22 0700   BP:  (!) 189/94 (!) 226/104 (!) 211/91   Pulse: 75   67   Resp: 18   19   Temp: 97.8 °F (36.6 °C)   98 °F (36.7 °C)   TempSrc: Oral   Oral   SpO2:    96%   Weight: 66.7 kg (147 lb)      Height:        09/03/22 0706   BP:    Pulse:    Resp:    Temp:    TempSrc:    SpO2:    Weight:    Height: 5' 1" (1.549 m)       Abnormal Lab Results:  Labs Reviewed   CBC W/ AUTO DIFFERENTIAL - Abnormal; Notable for the following components:       Result Value    WBC 14.03 (*)     MCHC 31.1 (*)     RDW 14.7 (*)     " Immature Granulocytes 0.6 (*)     Gran # (ANC) 9.6 (*)     Immature Grans (Abs) 0.08 (*)     Eos # 0.7 (*)     Baso # 0.22 (*)     Lymph % 16.8 (*)     All other components within normal limits   COMPREHENSIVE METABOLIC PANEL - Abnormal; Notable for the following components:    CO2 19 (*)     BUN 28 (*)     eGFR 43 (*)     All other components within normal limits   SEDIMENTATION RATE - Abnormal; Notable for the following components:    Sed Rate 29 (*)     All other components within normal limits   C-REACTIVE PROTEIN - Abnormal; Notable for the following components:    CRP 25.1 (*)     All other components within normal limits   B-TYPE NATRIURETIC PEPTIDE - Abnormal; Notable for the following components:     (*)     All other components within normal limits   CULTURE, BLOOD   CULTURE, BLOOD   LACTIC ACID, PLASMA   URINALYSIS, REFLEX TO URINE CULTURE        All Lab Results:  Results for orders placed or performed during the hospital encounter of 09/03/22   CBC auto differential   Result Value Ref Range    WBC 14.03 (H) 3.90 - 12.70 K/uL    RBC 4.64 4.00 - 5.40 M/uL    Hemoglobin 13.3 12.0 - 16.0 g/dL    Hematocrit 42.7 37.0 - 48.5 %    MCV 92 82 - 98 fL    MCH 28.7 27.0 - 31.0 pg    MCHC 31.1 (L) 32.0 - 36.0 g/dL    RDW 14.7 (H) 11.5 - 14.5 %    Platelets 340 150 - 450 K/uL    MPV 9.4 9.2 - 12.9 fL    Immature Granulocytes 0.6 (H) 0.0 - 0.5 %    Gran # (ANC) 9.6 (H) 1.8 - 7.7 K/uL    Immature Grans (Abs) 0.08 (H) 0.00 - 0.04 K/uL    Lymph # 2.4 1.0 - 4.8 K/uL    Mono # 1.0 0.3 - 1.0 K/uL    Eos # 0.7 (H) 0.0 - 0.5 K/uL    Baso # 0.22 (H) 0.00 - 0.20 K/uL    nRBC 0 0 /100 WBC    Gran % 68.4 38.0 - 73.0 %    Lymph % 16.8 (L) 18.0 - 48.0 %    Mono % 7.4 4.0 - 15.0 %    Eosinophil % 5.2 0.0 - 8.0 %    Basophil % 1.6 0.0 - 1.9 %    Differential Method Automated    Comprehensive metabolic panel   Result Value Ref Range    Sodium 139 136 - 145 mmol/L    Potassium 4.3 3.5 - 5.1 mmol/L    Chloride 108 95 - 110 mmol/L     CO2 19 (L) 23 - 29 mmol/L    Glucose 91 70 - 110 mg/dL    BUN 28 (H) 8 - 23 mg/dL    Creatinine 1.2 0.5 - 1.4 mg/dL    Calcium 9.7 8.7 - 10.5 mg/dL    Total Protein 7.7 6.0 - 8.4 g/dL    Albumin 3.7 3.5 - 5.2 g/dL    Total Bilirubin 0.3 0.1 - 1.0 mg/dL    Alkaline Phosphatase 89 55 - 135 U/L    AST 14 10 - 40 U/L    ALT 10 10 - 44 U/L    Anion Gap 12 8 - 16 mmol/L    eGFR 43 (A) >60 mL/min/1.73 m^2   Sedimentation rate   Result Value Ref Range    Sed Rate 29 (H) 0 - 20 mm/Hr   C-reactive protein   Result Value Ref Range    CRP 25.1 (H) 0.0 - 8.2 mg/L   Lactic acid, plasma   Result Value Ref Range    Lactate (Lactic Acid) 1.1 0.5 - 2.2 mmol/L   Brain natriuretic peptide   Result Value Ref Range     (H) 0 - 99 pg/mL         Imaging Results:  Imaging Results              X-Ray Chest AP Portable (Final result)  Result time 09/03/22 08:25:40      Final result by Charanjit Faira MD (09/03/22 08:25:40)                   Impression:      No acute chest findings.  No change since 08/19/2022.      Electronically signed by: Charanjit Faria  Date:    09/03/2022  Time:    08:25               Narrative:    EXAMINATION:  XR CHEST AP PORTABLE    CLINICAL HISTORY:  Pneumonia.    COMPARISON:  08/19/2022    FINDINGS:  Lungs are clear.  Heart within normal limits.  Mildly tortuous aorta with vascular calcification.  Single lead left-sided pacemaker/AICD.  No significantbony abnormality.    .                        ED Interpretation by Leon Navarrete MD (09/03/22 05:57:19, [facility] - Emergency Dept., Emergency Medicine)    No acute findings                                     The EKG was ordered, reviewed, and independently interpreted by the ED provider.  Interpretation time: 4:55  Rate: 67 BPM  Rhythm: normal sinus rhythm  Interpretation: Left bundle branch block. Abnormal ECG. No STEMI.           The Emergency Provider reviewed the vital signs and test results, which are outlined above.     ED Discussion     6:18  AM: Discussed pt's case with Dr. Story ([facility]) who recommends following up with deem vs pcp. Dr. Story also recommends giving her topicals vs steroids po.    6:35 AM: Dr. Navarrete transfers care of patient to Dr. Villalobos pending imaging results.    8:00 AM: Reassessed pt at this time.  Pt states her condition has improved at this time. Discussed with pt all pertinent ED information and results. Discussed pt dx of allergic reaction and plan of tx. Gave pt all f/u and return to the ED instructions. All questions and concerns were addressed at this time. Pt expresses understanding of information and instructions, and is comfortable with plan to discharge. Pt is stable for discharge.         Medical Decision Making:   Clinical Tests:   Lab Tests: Ordered and Reviewed  Radiological Study: Ordered and Reviewed  Medical Tests: Ordered and Reviewed         ED Medication(s):  Medications   methylPREDNISolone sodium succinate injection 125 mg (125 mg Intravenous Given 9/3/22 0453)   diphenhydrAMINE injection 12.5 mg (12.5 mg Intravenous Given 9/3/22 0453)   cloNIDine tablet 0.2 mg (0.2 mg Oral Given 9/3/22 0639)       Discharge Medication List as of 9/3/2022  7:17 AM           Follow-up Information       Bruno Nugent MD.    Specialty: Internal Medicine  Contact information:  [address] [facility] [address] [phone]                                 Scribe Attestation:   Scribe #1: I performed the above scribed service and the documentation accurately describes the services I performed. I attest to the accuracy of the note.     Attending:   Physician Attestation Statement for Scribe #1: I, Leon Navarrete MD, personally performed the services described in this documentation, as scribed by Patricio Villalobos, in my presence, and it is both accurate and complete.       Scribe Attestation:   Scribe #2: I performed the above scribed service and the documentation accurately describes the services I  performed. I attest to the accuracy of the note.    Attending Attestation:           Physician Attestation for Scribe:    Physician Attestation Statement for Scribe #2: I, Patricio Villalobos MD, reviewed documentation, as scribed by Jose Valverde in my presence, and it is both accurate and complete. I also acknowledge and confirm the content of the note done by Scribe #1.         Clinical Impression       ICD-10-CM ICD-9-CM   1. Adverse effect of drug, initial encounter  T50.905A E947.9   2. Rash  R21 782.1   3. Hypertension, unspecified type  I10 401.9               Patricio Villalobos MD  09/03/22 0836

## 2022-09-03 NOTE — ED NOTES
Pt presents to ED c/o full body rash and itching since IV antibiotics last week. Pt states she has been taking Benadryl but has had no relief. Pt  has redness and dry patches over entire body. Denies SOB or chest pain.  See full assessments for further.

## 2022-09-03 NOTE — ED NOTES
Received report from RIZWAN Andrews., introduced self to pt, and updated whiteboard    Pt is requesting to leave AMA despite having a BP of 226/104. Dr. Villalobos notified

## 2022-09-06 ENCOUNTER — TELEPHONE (OUTPATIENT)
Dept: ADMINISTRATIVE | Facility: CLINIC | Age: 87
End: 2022-09-06
Payer: MEDICARE

## 2022-09-06 ENCOUNTER — TELEPHONE (OUTPATIENT)
Dept: INFECTIOUS DISEASES | Facility: CLINIC | Age: 87
End: 2022-09-06
Payer: MEDICARE

## 2022-09-06 ENCOUNTER — EXTERNAL HOME HEALTH (OUTPATIENT)
Dept: HOME HEALTH SERVICES | Facility: HOSPITAL | Age: 87
End: 2022-09-06
Payer: MEDICARE

## 2022-09-06 RX ORDER — MINOCYCLINE HYDROCHLORIDE 100 MG/1
100 CAPSULE ORAL EVERY 12 HOURS
Qty: 84 CAPSULE | Refills: 0 | Status: SHIPPED | OUTPATIENT
Start: 2022-09-06 | End: 2022-10-18

## 2022-09-06 NOTE — TELEPHONE ENCOUNTER
Returned call to Malik with home health. Pt needs prescription sent by Dr. Hernandez last week to a different pharmacy. Pharmacy changed per request.

## 2022-09-06 NOTE — TELEPHONE ENCOUNTER
----- Message from Bere Feliz sent at 9/6/2022  3:03 PM CDT -----  Contact: [facility]      Who Called: Malik- Nurse      Does the patient know what this is regarding? Pt has not recvd antibiotic/ no pharmacy has it       Would the patient rather a call back or a response via [facility]?  Callback   Best Call Back Number:  [phone]

## 2022-09-08 LAB
BACTERIA BLD CULT: NORMAL
BACTERIA BLD CULT: NORMAL

## 2022-09-13 ENCOUNTER — DOCUMENT SCAN (OUTPATIENT)
Dept: HOME HEALTH SERVICES | Facility: HOSPITAL | Age: 87
End: 2022-09-13
Payer: MEDICARE

## 2022-09-13 ENCOUNTER — DOCUMENT SCAN (OUTPATIENT)
Dept: HOME HEALTH SERVICES | Facility: HOSPITAL | Age: 87
End: 2022-09-13

## 2022-09-13 ENCOUNTER — TELEPHONE (OUTPATIENT)
Dept: INFECTIOUS DISEASES | Facility: CLINIC | Age: 87
End: 2022-09-13
Payer: MEDICARE

## 2022-09-13 NOTE — TELEPHONE ENCOUNTER
----- Message from Michell Pack sent at 9/13/2022 11:32 AM CDT -----  Contact: [phone]  Patient called, stated that she was told by Dr Hernandez to scheduled an appointment with him (first available is until  11/14). Patient is requesting an early appointment. Thank you.

## 2022-09-19 ENCOUNTER — OFFICE VISIT (OUTPATIENT)
Dept: PODIATRY | Facility: CLINIC | Age: 87
End: 2022-09-19
Payer: MEDICARE

## 2022-09-19 DIAGNOSIS — L97.511 FOOT ULCERATION, RIGHT, LIMITED TO BREAKDOWN OF SKIN: Primary | ICD-10-CM

## 2022-09-19 DIAGNOSIS — R60.0 BILATERAL LOWER EXTREMITY EDEMA: ICD-10-CM

## 2022-09-19 PROCEDURE — 99213 OFFICE O/P EST LOW 20 MIN: CPT | Mod: S$PBB,,, | Performed by: PODIATRIST

## 2022-09-19 PROCEDURE — 99213 PR OFFICE/OUTPT VISIT, EST, LEVL III, 20-29 MIN: ICD-10-PCS | Mod: S$PBB,,, | Performed by: PODIATRIST

## 2022-09-19 PROCEDURE — 99999 PR PBB SHADOW E&M-EST. PATIENT-LVL II: CPT | Mod: PBBFAC,,, | Performed by: PODIATRIST

## 2022-09-19 PROCEDURE — 99212 OFFICE O/P EST SF 10 MIN: CPT | Mod: PBBFAC | Performed by: PODIATRIST

## 2022-09-19 PROCEDURE — 99999 PR PBB SHADOW E&M-EST. PATIENT-LVL II: ICD-10-PCS | Mod: PBBFAC,,, | Performed by: PODIATRIST

## 2022-09-19 NOTE — PROGRESS NOTES
Subjective:       Patient ID: Marysol Yancey is a 89 y.o. female.    Chief Complaint: Follow-up (Follow up from hospital stay- both feet)    HPI: Marysol Yancey presents to the office today to follow-up on ulceration present to the dorsal aspect the right foot.  She is tolerating p.o. antibiotics without complication.  Continues to have increased swelling bilateral lower extremities.  Does state that she sits in a slightly dependent position at home.  No significant improvement in the swelling to the bilateral lower extremities.  She denies any recent falls or injuries.  Presents to clinic today with her grandson present.    Review of patient's allergies indicates:   Allergen Reactions    Zetia [ezetimibe]     Atorvastatin     Avelox [moxifloxacin]     Erythromycin     Lisinopril     Norvasc [amlodipine]     Pcn [penicillins]     Statins-hmg-coa reductase inhibitors     Cefepime Rash       Past Medical History:   Diagnosis Date    Abnormal ECG 12/17/2014    AICD (automatic cardioverter/defibrillator) present 6/14/2013    CAD (coronary artery disease) 6/14/2013    Cancer     basil cell carcinoma    Cardiac arrest 6/14/2013    CHF (congestive heart failure)     CRI (chronic renal insufficiency) 6/14/2013    Depression 6/14/2013    Diabetes mellitus     Fatigue 6/14/2013    Heart block     Hyperlipidemia     Hypertension     LBBB (left bundle branch block) 12/17/2014    Mitral regurgitation 6/14/2013    Mixed hyperlipidemia 6/14/2013    Pulmonary nodule 6/14/2013    Thyroid disease     TR (tricuspid regurgitation) 6/14/2013    Ventricular fibrillation 6/14/2013       Family History   Problem Relation Age of Onset    Heart attack Father     Hyperlipidemia Father     Hypertension Father     Anemia Neg Hx     Arrhythmia Neg Hx     Asthma Neg Hx     Clotting disorder Neg Hx     Fainting Neg Hx     Heart disease Neg Hx     Heart failure Neg Hx     Hypertension Daughter        Social History     Socioeconomic History     Marital status: Single   Tobacco Use    Smoking status: Never    Smokeless tobacco: Never   Substance and Sexual Activity    Alcohol use: Yes     Alcohol/week: 7.0 standard drinks     Types: 7 Glasses of wine per week    Drug use: No       History reviewed. No pertinent surgical history.    Review of Systems       Objective:   There were no vitals taken for this visit.    X-Ray Chest AP Portable  Narrative: EXAMINATION:  XR CHEST AP PORTABLE    CLINICAL HISTORY:  Pneumonia.    COMPARISON:  08/19/2022    FINDINGS:  Lungs are clear.  Heart within normal limits.  Mildly tortuous aorta with vascular calcification.  Single lead left-sided pacemaker/AICD.  No significantbony abnormality.    .  Impression: No acute chest findings.  No change since 08/19/2022.    Electronically signed by: Charanjit Faria  Date:    09/03/2022  Time:    08:25       Physical Exam   LOWER EXTREMITY PHYSICAL EXAMINATION    Vascular:  The right dorsalis pedis pulse 2/4 and the right posterior tibial pulse 1/4.  The left dorsalis pedis pulse 2/4 and posterior tibial pulse on the left is 1/4.  Capillary refill is intact.  Pedal hair growth decreased.     Neurological:  Protective sensation is intact with [place] monofilament. Proprioception is intact. Intact sensation to light touch.  Vibratory sensation is diminished to the 1st metatarsal phalangeal joint.     Musculoskeletal:  Crossover right 2nd toe.  Ambulates in wheelchair    Dermatological:  Skin is thin, shiny, and atrophic.  Xerosis present to the lower extremity skin.  There is no evidence of an ulceration present to the left foot.  Callus formation present to the distal aspect left 2nd toe without underlying wound.  Superficial ulceration present to the dorsal aspect the right foot limited to skin.          Assessment:     1. Foot ulceration, right, limited to breakdown of skin    2. Bilateral lower extremity edema        Plan:     Foot ulceration, right, limited to breakdown  of skin    Bilateral lower extremity edema      Thorough discussion is had with the patient today, concerning the diagnosis, its etiology, and the treatment algorithm at present.    Ulceration to the dorsal aspect the right foot appears limited to skin and show significant improvement since hospitalization.  I do encourage patient to continue with appropriate wound care and dressings which are currently being performed.  No changes to the current wound care orders.    Recommend patient continue with elevation of bilateral lower extremities to decrease fluid accumulation and pooling to the feet.        Future Appointments   Date Time Provider Department Center   9/23/2022  9:20 AM Samuel Mares MD ON CARDIO  Medical C

## 2022-09-21 ENCOUNTER — TELEPHONE (OUTPATIENT)
Dept: CARDIOLOGY | Facility: CLINIC | Age: 87
End: 2022-09-21
Payer: MEDICARE

## 2022-09-21 NOTE — TELEPHONE ENCOUNTER
Can restart hctz at 1/2 tablet   The patient has been notified of this information and all questions answered.      Pt stated she has never been on HCTZ- needs a script sent to [facility] [place] [address]        From Malik with [facility]:  Pt off of HCTZ a few weeks back when out of hospital - pt has had an eight pound weight gain this week 158pounds last week 150 pounds and week before was 144 pounds  Taking all other meds  Has edema to lower extremities - break in skin oozing   /82 - HR-65  Please advise      ----- Message from Yael Shay sent at 9/21/2022  1:47 PM CDT -----  Contact: Malik  [facility]  States the pt has had a 8lb gain w/ no order for fluid pill, no additional info given and can be reached at [phone]///[id number]

## 2022-09-22 ENCOUNTER — TELEPHONE (OUTPATIENT)
Dept: CARDIOLOGY | Facility: CLINIC | Age: 87
End: 2022-09-22
Payer: MEDICARE

## 2022-09-23 ENCOUNTER — OFFICE VISIT (OUTPATIENT)
Dept: CARDIOLOGY | Facility: CLINIC | Age: 87
End: 2022-09-23
Payer: MEDICARE

## 2022-09-23 VITALS
DIASTOLIC BLOOD PRESSURE: 76 MMHG | BODY MASS INDEX: 27.78 KG/M2 | SYSTOLIC BLOOD PRESSURE: 136 MMHG | HEART RATE: 60 BPM | HEIGHT: 61 IN

## 2022-09-23 DIAGNOSIS — R60.9 EDEMA, UNSPECIFIED TYPE: ICD-10-CM

## 2022-09-23 DIAGNOSIS — I25.9 CHRONIC ISCHEMIC HEART DISEASE: ICD-10-CM

## 2022-09-23 DIAGNOSIS — E78.2 MIXED HYPERLIPIDEMIA: Chronic | ICD-10-CM

## 2022-09-23 DIAGNOSIS — I10 PRIMARY HYPERTENSION: Chronic | ICD-10-CM

## 2022-09-23 DIAGNOSIS — I44.7 LBBB (LEFT BUNDLE BRANCH BLOCK): Chronic | ICD-10-CM

## 2022-09-23 DIAGNOSIS — Z95.810 ICD (IMPLANTABLE CARDIOVERTER-DEFIBRILLATOR) IN PLACE: Primary | ICD-10-CM

## 2022-09-23 PROCEDURE — 99213 OFFICE O/P EST LOW 20 MIN: CPT | Mod: PBBFAC | Performed by: INTERNAL MEDICINE

## 2022-09-23 PROCEDURE — 99205 PR OFFICE/OUTPT VISIT, NEW, LEVL V, 60-74 MIN: ICD-10-PCS | Mod: S$PBB,,, | Performed by: INTERNAL MEDICINE

## 2022-09-23 PROCEDURE — 99999 PR PBB SHADOW E&M-EST. PATIENT-LVL III: CPT | Mod: PBBFAC,,, | Performed by: INTERNAL MEDICINE

## 2022-09-23 PROCEDURE — 99999 PR PBB SHADOW E&M-EST. PATIENT-LVL III: ICD-10-PCS | Mod: PBBFAC,,, | Performed by: INTERNAL MEDICINE

## 2022-09-23 PROCEDURE — 99205 OFFICE O/P NEW HI 60 MIN: CPT | Mod: S$PBB,,, | Performed by: INTERNAL MEDICINE

## 2022-09-23 RX ORDER — FUROSEMIDE 20 MG/1
20 TABLET ORAL DAILY
Qty: 30 TABLET | Refills: 11 | Status: SHIPPED | OUTPATIENT
Start: 2022-09-23 | End: 2023-09-23

## 2022-09-23 NOTE — PROGRESS NOTES
Subjective:   Patient ID:  Marysol Yancey is a 89 y.o. female who presents for follow-up of No chief complaint on file.  Pt is s/p osteomyelitis of R foot and (+) troponin ( demand ischemia)  Echo- EF=55%. Pt does not want AICD checks  Risks and benefits explained   Pt with BLE edema    Hypertension  This is a chronic problem. The current episode started more than 1 year ago. The problem has been gradually improving since onset. Pertinent negatives include no chest pain, palpitations or shortness of breath. Past treatments include diuretics and beta blockers. The current treatment provides moderate improvement. There are no compliance problems.    Edema  This is a recurrent problem. The current episode started 1 to 4 weeks ago. The problem occurs intermittently. The problem has been waxing and waning. Pertinent negatives include no chest pain. Nothing aggravates the symptoms. She has tried rest for the symptoms. The treatment provided mild relief.     Review of Systems   Constitutional: Negative. Negative for weight gain.   HENT: Negative.     Eyes: Negative.    Cardiovascular: Negative.  Negative for chest pain, leg swelling and palpitations.   Respiratory: Negative.  Negative for shortness of breath.    Endocrine: Negative.    Hematologic/Lymphatic: Negative.    Skin: Negative.    Musculoskeletal:  Negative for muscle weakness.   Gastrointestinal: Negative.    Genitourinary: Negative.    Neurological: Negative.  Negative for dizziness.   Psychiatric/Behavioral: Negative.     Allergic/Immunologic: Negative.    All other systems reviewed and are negative.  Family History   Problem Relation Age of Onset    Heart attack Father     Hyperlipidemia Father     Hypertension Father     Anemia Neg Hx     Arrhythmia Neg Hx     Asthma Neg Hx     Clotting disorder Neg Hx     Fainting Neg Hx     Heart disease Neg Hx     Heart failure Neg Hx     Hypertension Daughter      Past Medical History:   Diagnosis Date    Abnormal ECG  12/17/2014    AICD (automatic cardioverter/defibrillator) present 6/14/2013    CAD (coronary artery disease) 6/14/2013    Cancer     basil cell carcinoma    Cardiac arrest 6/14/2013    CHF (congestive heart failure)     CRI (chronic renal insufficiency) 6/14/2013    Depression 6/14/2013    Diabetes mellitus     Fatigue 6/14/2013    Heart block     Hyperlipidemia     Hypertension     LBBB (left bundle branch block) 12/17/2014    Mitral regurgitation 6/14/2013    Mixed hyperlipidemia 6/14/2013    Pulmonary nodule 6/14/2013    Thyroid disease     TR (tricuspid regurgitation) 6/14/2013    Ventricular fibrillation 6/14/2013     Social History     Socioeconomic History    Marital status: Single   Tobacco Use    Smoking status: Never    Smokeless tobacco: Never   Substance and Sexual Activity    Alcohol use: Yes     Alcohol/week: 7.0 standard drinks     Types: 7 Glasses of wine per week    Drug use: No     Current Outpatient Medications on File Prior to Visit   Medication Sig Dispense Refill    aspirin (ECOTRIN) 325 MG EC tablet Take 325 mg by mouth once daily.      carvedilol (COREG) 6.25 MG tablet Take 1 tablet (6.25 mg total) by mouth 2 (two) times daily with meals. 180 tablet 3    cefepime in dextrose 5 % (MAXIPIME) 2 gram/50 mL PgBk Inject 50 mLs (2 g total) into the vein once daily.      clonazePAM (KLONOPIN) 0.5 MG tablet Take 0.5 mg by mouth every evening.      cloNIDine (CATAPRES) 0.2 MG tablet Take 1 tablet by mouth every evening.      fluoxetine (PROZAC) 20 MG tablet Take 20 mg by mouth once daily.      levothyroxine (SYNTHROID) 75 MCG tablet Take 25 mcg by mouth once daily.      minocycline (MINOCIN,DYNACIN) 100 MG capsule Take 1 capsule (100 mg total) by mouth every 12 (twelve) hours. 84 capsule 0    naproxen sodium 220 mg Cap Take 1 tablet by mouth every evening. Aleve PM      omega-3 fatty acids (FISH OIL CONCENTRATE ORAL) Take 1,000 mg by mouth once daily.      [DISCONTINUED] sertraline (ZOLOFT) 25 MG  tablet Take 25 mg by mouth once daily.       No current facility-administered medications on file prior to visit.     Review of patient's allergies indicates:   Allergen Reactions    Zetia [ezetimibe]     Atorvastatin     Avelox [moxifloxacin]     Erythromycin     Lisinopril     Norvasc [amlodipine]     Pcn [penicillins]     Statins-hmg-coa reductase inhibitors     Cefepime Rash       Objective:     Physical Exam  Vitals and nursing note reviewed.   Constitutional:       Appearance: She is well-developed.   HENT:      Head: Normocephalic and atraumatic.   Eyes:      Conjunctiva/sclera: Conjunctivae normal.      Pupils: Pupils are equal, round, and reactive to light.   Cardiovascular:      Rate and Rhythm: Normal rate and regular rhythm.      Pulses: Intact distal pulses.      Heart sounds: Normal heart sounds.   Pulmonary:      Effort: Pulmonary effort is normal.      Breath sounds: Normal breath sounds.   Abdominal:      General: Bowel sounds are normal.      Palpations: Abdomen is soft.   Musculoskeletal:         General: Normal range of motion.      Cervical back: Normal range of motion and neck supple.   Skin:     General: Skin is warm and dry.   Neurological:      Mental Status: She is alert and oriented to person, place, and time.       Assessment:     1. ICD (implantable cardioverter-defibrillator) in place    2. Primary hypertension    3. Mixed hyperlipidemia    4. LBBB (left bundle branch block)    5. Chronic ischemic heart disease        Plan:     ICD (implantable cardioverter-defibrillator) in place    Primary hypertension    Mixed hyperlipidemia    LBBB (left bundle branch block)    Chronic ischemic heart disease      Continue coreg, clonidine- HTN  Start lasix

## 2022-10-07 ENCOUNTER — TELEPHONE (OUTPATIENT)
Dept: PULMONOLOGY | Facility: CLINIC | Age: 87
End: 2022-10-07
Payer: MEDICARE

## 2022-10-07 NOTE — TELEPHONE ENCOUNTER
Called pt and advise to stop monocycline per . Mary will review again on Monday to see how pt is doing and other options if necessary. Pt  verbalized understanding. Home health nurse, Urmila, notified as well.

## 2022-10-07 NOTE — TELEPHONE ENCOUNTER
----- Message from Tyler Bruno sent at 10/7/2022 11:51 AM CDT -----  Ely with home health is calling in regards to consulting with a nurse in regards to the decrease in appetite. She is no longer able to take in fluid or food since taking minocycline.   Please call her back [phone].    Thank you    Selvin.

## 2022-10-07 NOTE — TELEPHONE ENCOUNTER
Home health nurse called to report that last week pt stated that taste buds were acting up food and drink tasted bitter but she was still able to tolerate food and drink. When HH nurse saw pt this week pt reports that taste has gotten worse and that she has not been able to tolerate food or drink since starting the antibiotic minocycline. Please advise

## 2022-11-21 PROBLEM — A41.9 SEVERE SEPSIS: Status: RESOLVED | Noted: 2022-08-20 | Resolved: 2022-11-21

## 2022-11-21 PROBLEM — R65.20 SEVERE SEPSIS: Status: RESOLVED | Noted: 2022-08-20 | Resolved: 2022-11-21

## 2022-11-25 ENCOUNTER — DOCUMENT SCAN (OUTPATIENT)
Dept: HOME HEALTH SERVICES | Facility: HOSPITAL | Age: 87
End: 2022-11-25
Payer: MEDICARE

## 2022-11-28 ENCOUNTER — OFFICE VISIT (OUTPATIENT)
Dept: CARDIOLOGY | Facility: CLINIC | Age: 87
End: 2022-11-28
Payer: MEDICARE

## 2022-11-28 DIAGNOSIS — I10 PRIMARY HYPERTENSION: Chronic | ICD-10-CM

## 2022-11-28 DIAGNOSIS — E78.2 MIXED HYPERLIPIDEMIA: Chronic | ICD-10-CM

## 2022-11-28 DIAGNOSIS — I25.9 CHRONIC ISCHEMIC HEART DISEASE: ICD-10-CM

## 2022-11-28 DIAGNOSIS — I46.9 CARDIAC ARREST: Chronic | ICD-10-CM

## 2022-11-28 DIAGNOSIS — Z95.810 ICD (IMPLANTABLE CARDIOVERTER-DEFIBRILLATOR) IN PLACE: Primary | ICD-10-CM

## 2022-11-28 DIAGNOSIS — R53.82 CHRONIC FATIGUE: Chronic | ICD-10-CM

## 2022-11-28 DIAGNOSIS — I25.10 CORONARY ARTERY DISEASE INVOLVING NATIVE CORONARY ARTERY OF NATIVE HEART WITHOUT ANGINA PECTORIS: Chronic | ICD-10-CM

## 2022-11-28 DIAGNOSIS — I44.7 LBBB (LEFT BUNDLE BRANCH BLOCK): Chronic | ICD-10-CM

## 2022-11-28 PROCEDURE — 99214 OFFICE O/P EST MOD 30 MIN: CPT | Mod: 95,,, | Performed by: INTERNAL MEDICINE

## 2022-11-28 PROCEDURE — 99214 PR OFFICE/OUTPT VISIT, EST, LEVL IV, 30-39 MIN: ICD-10-PCS | Mod: 95,,, | Performed by: INTERNAL MEDICINE

## 2022-11-28 NOTE — PROGRESS NOTES
Subjective:   Patient ID:  Marysol Yancey is a 89 y.o. female who presents for follow-up of No chief complaint on file.      HPI  The patient location is: home  The chief complaint leading to consultation is:   Visit type: Virtual visit with synchronous audio and video  Total time spent with patient:   Each patient to whom he or she provides medical services by telemedicine is:  (1) informed of the relationship between the physician and patient and the respective role of any other health care provider with respect to management of the patient; and (2) notified that he or she may decline to receive medical services by telemedicine and may withdraw from such care at any time.     8-22 admit osteomyelitis and (+) troponin ( demand ischemia).  Last  in clinic visit 9-22  Diuretics improved edema . Takes it prn  Echo- EF=55%. Pt does not want AICD checks  Risks and benefits explained   Review of Systems   Constitutional: Negative. Negative for weight gain.   HENT: Negative.     Eyes: Negative.    Cardiovascular: Negative.  Negative for chest pain, leg swelling and palpitations.   Respiratory: Negative.  Negative for shortness of breath.    Endocrine: Negative.    Hematologic/Lymphatic: Negative.    Skin: Negative.    Musculoskeletal:  Negative for muscle weakness.   Gastrointestinal: Negative.    Genitourinary: Negative.    Neurological: Negative.  Negative for dizziness.   Psychiatric/Behavioral: Negative.     Allergic/Immunologic: Negative.    All other systems reviewed and are negative.  Family History   Problem Relation Age of Onset    Heart attack Father     Hyperlipidemia Father     Hypertension Father     Anemia Neg Hx     Arrhythmia Neg Hx     Asthma Neg Hx     Clotting disorder Neg Hx     Fainting Neg Hx     Heart disease Neg Hx     Heart failure Neg Hx     Hypertension Daughter      Past Medical History:   Diagnosis Date    Abnormal ECG 12/17/2014    AICD (automatic cardioverter/defibrillator) present 6/14/2013     CAD (coronary artery disease) 6/14/2013    Cancer     basil cell carcinoma    Cardiac arrest 6/14/2013    CHF (congestive heart failure)     CRI (chronic renal insufficiency) 6/14/2013    Depression 6/14/2013    Diabetes mellitus     Fatigue 6/14/2013    Heart block     Hyperlipidemia     Hypertension     LBBB (left bundle branch block) 12/17/2014    Mitral regurgitation 6/14/2013    Mixed hyperlipidemia 6/14/2013    Pulmonary nodule 6/14/2013    Thyroid disease     TR (tricuspid regurgitation) 6/14/2013    Ventricular fibrillation 6/14/2013     Social History     Socioeconomic History    Marital status: Single   Tobacco Use    Smoking status: Never    Smokeless tobacco: Never   Substance and Sexual Activity    Alcohol use: Yes     Alcohol/week: 7.0 standard drinks     Types: 7 Glasses of wine per week    Drug use: No     Current Outpatient Medications on File Prior to Visit   Medication Sig Dispense Refill    aspirin (ECOTRIN) 325 MG EC tablet Take 325 mg by mouth once daily.      carvedilol (COREG) 6.25 MG tablet Take 1 tablet (6.25 mg total) by mouth 2 (two) times daily with meals. 180 tablet 3    cefepime in dextrose 5 % (MAXIPIME) 2 gram/50 mL PgBk Inject 50 mLs (2 g total) into the vein once daily.      clonazePAM (KLONOPIN) 0.5 MG tablet Take 0.5 mg by mouth every evening.      cloNIDine (CATAPRES) 0.2 MG tablet Take 1 tablet by mouth every evening.      fluoxetine (PROZAC) 20 MG tablet Take 20 mg by mouth once daily.      furosemide (LASIX) 20 MG tablet Take 1 tablet (20 mg total) by mouth once daily. 30 tablet 11    levothyroxine (SYNTHROID) 75 MCG tablet Take 25 mcg by mouth once daily.      naproxen sodium 220 mg Cap Take 1 tablet by mouth every evening. Aleve PM      omega-3 fatty acids (FISH OIL CONCENTRATE ORAL) Take 1,000 mg by mouth once daily.      [DISCONTINUED] sertraline (ZOLOFT) 25 MG tablet Take 25 mg by mouth once daily.       No current facility-administered medications on file prior to  visit.     Review of patient's allergies indicates:   Allergen Reactions    Zetia [ezetimibe]     Atorvastatin     Avelox [moxifloxacin]     Erythromycin     Lisinopril     Norvasc [amlodipine]     Pcn [penicillins]     Statins-hmg-coa reductase inhibitors     Cefepime Rash       Objective:     Physical Exam  Constitutional:       Appearance: Normal appearance.   Neurological:      Mental Status: She is alert.       Assessment:     1. ICD (implantable cardioverter-defibrillator) in place    2. Chronic ischemic heart disease    3. Coronary artery disease involving native coronary artery of native heart without angina pectoris    4. Cardiac arrest    5. Mixed hyperlipidemia    6. Primary hypertension    7. LBBB (left bundle branch block)    8. Chronic fatigue        Plan:     ICD (implantable cardioverter-defibrillator) in place    Chronic ischemic heart disease    Coronary artery disease involving native coronary artery of native heart without angina pectoris    Cardiac arrest    Mixed hyperlipidemia    Primary hypertension    LBBB (left bundle branch block)    Chronic fatigue      Continue diuretics prn- edema  Continue coreg, clonidine- HTN

## 2023-02-16 ENCOUNTER — OFFICE VISIT (OUTPATIENT)
Dept: PODIATRY | Facility: CLINIC | Age: 88
End: 2023-02-16
Payer: MEDICARE

## 2023-02-16 VITALS — WEIGHT: 147.06 LBS | BODY MASS INDEX: 27.77 KG/M2 | HEIGHT: 61 IN

## 2023-02-16 DIAGNOSIS — M20.5X1 CROSSOVER TOE DEFORMITY OF RIGHT FOOT: ICD-10-CM

## 2023-02-16 DIAGNOSIS — L97.312: Primary | ICD-10-CM

## 2023-02-16 DIAGNOSIS — M86.8X7 OTHER OSTEOMYELITIS OF RIGHT FOOT: ICD-10-CM

## 2023-02-16 PROCEDURE — 99999 PR PBB SHADOW E&M-EST. PATIENT-LVL III: CPT | Mod: PBBFAC,,, | Performed by: PODIATRIST

## 2023-02-16 PROCEDURE — 99999 PR PBB SHADOW E&M-EST. PATIENT-LVL III: ICD-10-PCS | Mod: PBBFAC,,, | Performed by: PODIATRIST

## 2023-02-16 PROCEDURE — 11042 PR DEBRIDEMENT, SKIN, SUB-Q TISSUE,=<20 SQ CM: ICD-10-PCS | Mod: S$PBB,,, | Performed by: PODIATRIST

## 2023-02-16 PROCEDURE — 99213 OFFICE O/P EST LOW 20 MIN: CPT | Mod: PBBFAC | Performed by: PODIATRIST

## 2023-02-16 PROCEDURE — 11042 DBRDMT SUBQ TIS 1ST 20SQCM/<: CPT | Mod: PBBFAC | Performed by: PODIATRIST

## 2023-02-16 PROCEDURE — 99214 OFFICE O/P EST MOD 30 MIN: CPT | Mod: 25,S$PBB,, | Performed by: PODIATRIST

## 2023-02-16 PROCEDURE — 99214 PR OFFICE/OUTPT VISIT, EST, LEVL IV, 30-39 MIN: ICD-10-PCS | Mod: 25,S$PBB,, | Performed by: PODIATRIST

## 2023-02-16 PROCEDURE — 11042 DBRDMT SUBQ TIS 1ST 20SQCM/<: CPT | Mod: S$PBB,,, | Performed by: PODIATRIST

## 2023-02-16 NOTE — PROGRESS NOTES
Subjective:       Patient ID: Marysol Yancey is a 89 y.o. female.    Chief Complaint: Foot Problem (Pt has a bone infection in toe, rates pain 3/10, nondiabetic, wearing socks and shoes, last seen PCP Dr. Nugent on 02/14/2023.)    HPI: Marysol Yancey presents to the office today, with an open wound to the plantar aspect of the right foot.  States 3/10 pain.  Was recently seen by her primary care provider for cellulitis to the right foot.  X-ray was performed which showed poorly defined bone margin suggestive of osteomyelitis.  She reports mild discomfort with ambulation.  Does have history of foot deformity.  Her daughter, who is present at clinic today, states that she is mildly compliant with her care.    Review of patient's allergies indicates:   Allergen Reactions    Zetia [ezetimibe]     Atorvastatin     Avelox [moxifloxacin]     Erythromycin     Lisinopril     Norvasc [amlodipine]     Pcn [penicillins]     Statins-hmg-coa reductase inhibitors     Cefepime Rash       Past Medical History:   Diagnosis Date    Abnormal ECG 12/17/2014    AICD (automatic cardioverter/defibrillator) present 6/14/2013    CAD (coronary artery disease) 6/14/2013    Cancer     basil cell carcinoma    Cardiac arrest 6/14/2013    CHF (congestive heart failure)     CRI (chronic renal insufficiency) 6/14/2013    Depression 6/14/2013    Diabetes mellitus     Fatigue 6/14/2013    Heart block     Hyperlipidemia     Hypertension     LBBB (left bundle branch block) 12/17/2014    Mitral regurgitation 6/14/2013    Mixed hyperlipidemia 6/14/2013    Pulmonary nodule 6/14/2013    Thyroid disease     TR (tricuspid regurgitation) 6/14/2013    Ventricular fibrillation 6/14/2013       Family History   Problem Relation Age of Onset    Heart attack Father     Hyperlipidemia Father     Hypertension Father     Anemia Neg Hx     Arrhythmia Neg Hx     Asthma Neg Hx     Clotting disorder Neg Hx     Fainting Neg Hx     Heart disease Neg Hx     Heart failure Neg  "Hx     Hypertension Daughter        Social History     Socioeconomic History    Marital status: Single   Tobacco Use    Smoking status: Never    Smokeless tobacco: Never   Substance and Sexual Activity    Alcohol use: Yes     Alcohol/week: 7.0 standard drinks     Types: 7 Glasses of wine per week    Drug use: No       History reviewed. No pertinent surgical history.    Review of Systems       Objective:   Ht 5' 1" (1.549 m)   Wt 66.7 kg (147 lb 0.8 oz)   BMI 27.78 kg/m²     X-Ray Chest AP Portable  Narrative: EXAMINATION:  XR CHEST AP PORTABLE    CLINICAL HISTORY:  Pneumonia.    COMPARISON:  08/19/2022    FINDINGS:  Lungs are clear.  Heart within normal limits.  Mildly tortuous aorta with vascular calcification.  Single lead left-sided pacemaker/AICD.  No significantbony abnormality.    .  Impression: No acute chest findings.  No change since 08/19/2022.    Electronically signed by: Charanjit Faria  Date:    09/03/2022  Time:    08:25       Physical Exam   LOWER EXTREMITY PHYSICAL EXAMINATION    Vascular:  The right dorsalis pedis pulse 2/4 and the right posterior tibial pulse 2/4.  The left dorsalis pedis pulse 2/4 and posterior tibial pulse on the left is 2/4.  Capillary refill is intact.  Pedal hair growth intact    Neurological:  Protective sensation is intact with Lacona Bubba monofilament. Proprioception is intact. Intact sensation to light touch.  Vibratory sensation is diminished to the 1st metatarsal phalangeal joint.   Musculoskeletal: Manual Muscle Testing is 5/5 with dorsiflexion, plantar flexion, abduction, and adduction.   Deformity present to the right foot.     Dermatological:  Skin is thin, shiny, and atrophic.  There is an ulceration present to the plantar aspect of the right foot     Ulcer#1  Ulcer location:  Ulceration present to the plantar aspect of the 4th metatarsal head.  Pre debridement Measurements:  Superficial discolored callus  Post debridement Measurements :  0.4 cm x 0.3 cm x " 0.1 cm deep.  Signs of infection:  No signs of acute infection  Erythema:  None  Undermining/Tunneling:  No probe to bone  Drainage:  None  Periwound skin:  Hyperkeratotic  Wound Base:  Granular      Imaging:     Impressions   02/14/2023 10:07 AM CST    1.  Acute fracture of the head of the third metatarsal with poorly defined bony  margin suggesting acute osteomyelitis.  There also may be involvement of the  proximal phalanx of the middle toe.  2.  Subluxations of the first and second rays as described which could occur  with degenerative change or early neuropathic joint.       Imaging Results - X-ray foot right complete (02/14/2023 10:07 AM CST)  Narrative   02/14/2023 10:07 AM CST    EXAM: XR FOOT COMP RIGHT MIN 3 VIEWS  CLINICAL HISTORY: Cellulitis.  FINDINGS: There is soft tissue swelling of the forefoot with thickening of the  skin and extensive cutaneous edema.  There is erosion of the head of the third  metatarsal with a pathologic fracture.  The distal fracture fragment is  deviated laterally.  There is somewhat poorly defined margins of the proximal  most aspect of the proximal phalanx of the middle toe.  No definite additional  osseous erosion.  There is dislocation of the second metatarsophalangeal joint  with the second toe deviated medially.  Hallux valgus deformity also with  subluxation of the interphalangeal joint of the great toe medially.  No soft  tissue gas.  No radiopaque foreign bodies.  Dictated and Electronically Signed By: Jack Logan MD on February 14, 2023         Assessment:     1. Skin ulcer of ankle, right, with fat layer exposed    2. Crossover toe deformity of right foot    3. Other osteomyelitis of right foot        Plan:     Skin ulcer of ankle, right, with fat layer exposed    Crossover toe deformity of right foot    Other osteomyelitis of right foot      Thorough discussion is had with the patient today, concerning the diagnosis, its etiology, and the treatment algorithm at  present.    The wound was surgically debrided after adequate prep with alcohol and/or betadine paint. Excisional wound debridement was performed using sharp #10/#15 blade/rounded scalpel and tissue nipper, with removal of all non-viable skin and soft tissues; necrotic skin/tissue formation. The woundbase/wound bed was also debrided to encourage bleeding as to promote/stimulate healing. Debridement was excisional and included epidermal, dermal and subcutaneous tissues. Post debridement measurements are as above. Hemostasis was achieved. Patient tolerated procedure well and without complications. Local woundcare with Medihoney dressings and bandage thereafter.     Postoperative shoe was fashioned with 1/4 inch felt padding to completely offload the area of ulceration.  Patient will utilize shoe at all times while in the hallux or outside of the house    We did discuss the x-rays in detail concerning for potential of acute osteomyelitis.  Unfortunately, there is no way to determine if this is acute or chronic.  We did discuss completing the oral antibiotics as long as there is no open or exposed bone, would recommend treating the ulceration as a diabetic foot ulcer rather than infective ulcer.    Foot counseling and education is provided at this visit. Patient is advised to wear socks and shoes at all times.  Do not walk barefoot, or with just socks, even when indoors.  Be sure to check and inspect the inside of the shoe before putting them on her feet.  Protect your feet at all times.  Walking shoes and/or athletic shoes are the best types of shoe gear. Do not wear vinyl or plastic type shoe gear, as they do not stretch and/or breathe.  Protect your feet from hot and/or cold. Elevate the extremities when sitting.  Do not wear excessively tight socks and/or shoe gear. Wiggle your toes for a few minutes throughout the day. Move your ankles up and down, in and out, to help blood flow in your lower extremity.     She will  follow-up in 3 weeks      Future Appointments   Date Time Provider Department Center   3/13/2023  2:45 PM Meri Daly DPM [facility]   5/29/2023 11:20 AM Samuel Mares MD [facility]

## 2023-03-13 ENCOUNTER — OFFICE VISIT (OUTPATIENT)
Dept: PODIATRY | Facility: CLINIC | Age: 88
End: 2023-03-13
Payer: MEDICARE

## 2023-03-13 VITALS — WEIGHT: 147 LBS | HEIGHT: 61 IN | BODY MASS INDEX: 27.75 KG/M2

## 2023-03-13 DIAGNOSIS — Z87.2 HISTORY OF FOOT ULCER: Primary | ICD-10-CM

## 2023-03-13 DIAGNOSIS — L97.512: ICD-10-CM

## 2023-03-13 DIAGNOSIS — M20.5X1 CROSSOVER TOE DEFORMITY OF RIGHT FOOT: ICD-10-CM

## 2023-03-13 PROCEDURE — 99213 PR OFFICE/OUTPT VISIT, EST, LEVL III, 20-29 MIN: ICD-10-PCS | Mod: S$PBB,,, | Performed by: PODIATRIST

## 2023-03-13 PROCEDURE — 99999 PR PBB SHADOW E&M-EST. PATIENT-LVL III: ICD-10-PCS | Mod: PBBFAC,,, | Performed by: PODIATRIST

## 2023-03-13 PROCEDURE — 99213 OFFICE O/P EST LOW 20 MIN: CPT | Mod: PBBFAC | Performed by: PODIATRIST

## 2023-03-13 PROCEDURE — 99213 OFFICE O/P EST LOW 20 MIN: CPT | Mod: S$PBB,,, | Performed by: PODIATRIST

## 2023-03-13 PROCEDURE — 99999 PR PBB SHADOW E&M-EST. PATIENT-LVL III: CPT | Mod: PBBFAC,,, | Performed by: PODIATRIST

## 2023-03-13 NOTE — PROGRESS NOTES
Subjective:       Patient ID: Marysol Yancey is a 89 y.o. female.    Chief Complaint: Wound Check (Wound check, 0 pain, non diabetic wears tennis shoes and socks, Last seen PCP Dr. Nugent 02/14/2023)    HPI: Marysol Yancey presents to the office today, with an open wound to the plantar aspect of the right foot.  States 0/10 pain.  She reports no discomfort with ambulation.  Does have history of foot deformity.  Her daughter is present today.    Review of patient's allergies indicates:   Allergen Reactions    Zetia [ezetimibe]     Atorvastatin     Avelox [moxifloxacin]     Erythromycin     Lisinopril     Norvasc [amlodipine]     Pcn [penicillins]     Statins-hmg-coa reductase inhibitors     Cefepime Rash       Past Medical History:   Diagnosis Date    Abnormal ECG 12/17/2014    AICD (automatic cardioverter/defibrillator) present 6/14/2013    CAD (coronary artery disease) 6/14/2013    Cancer     basil cell carcinoma    Cardiac arrest 6/14/2013    CHF (congestive heart failure)     CRI (chronic renal insufficiency) 6/14/2013    Depression 6/14/2013    Diabetes mellitus     Fatigue 6/14/2013    Heart block     Hyperlipidemia     Hypertension     LBBB (left bundle branch block) 12/17/2014    Mitral regurgitation 6/14/2013    Mixed hyperlipidemia 6/14/2013    Pulmonary nodule 6/14/2013    Thyroid disease     TR (tricuspid regurgitation) 6/14/2013    Ventricular fibrillation 6/14/2013       Family History   Problem Relation Age of Onset    Heart attack Father     Hyperlipidemia Father     Hypertension Father     Anemia Neg Hx     Arrhythmia Neg Hx     Asthma Neg Hx     Clotting disorder Neg Hx     Fainting Neg Hx     Heart disease Neg Hx     Heart failure Neg Hx     Hypertension Daughter        Social History     Socioeconomic History    Marital status: Single   Tobacco Use    Smoking status: Never    Smokeless tobacco: Never   Substance and Sexual Activity    Alcohol use: Yes     Alcohol/week: 7.0 standard drinks      "Types: 7 Glasses of wine per week    Drug use: No       History reviewed. No pertinent surgical history.    Review of Systems       Objective:   Ht 5' 1" (1.549 m)   Wt 66.7 kg (147 lb)   BMI 27.78 kg/m²     X-Ray Chest AP Portable  Narrative: EXAMINATION:  XR CHEST AP PORTABLE    CLINICAL HISTORY:  Pneumonia.    COMPARISON:  08/19/2022    FINDINGS:  Lungs are clear.  Heart within normal limits.  Mildly tortuous aorta with vascular calcification.  Single lead left-sided pacemaker/AICD.  No significantbony abnormality.    .  Impression: No acute chest findings.  No change since 08/19/2022.    Electronically signed by: Charanjit Faria  Date:    09/03/2022  Time:    08:25       Physical Exam   LOWER EXTREMITY PHYSICAL EXAMINATION    Vascular:  The right dorsalis pedis pulse 2/4 and the right posterior tibial pulse 2/4.  The left dorsalis pedis pulse 2/4 and posterior tibial pulse on the left is 2/4.  Capillary refill is intact.  Pedal hair growth intact    Neurological:  Protective sensation is intact with [place] monofilament. Proprioception is intact. Intact sensation to light touch.  Vibratory sensation is diminished to the 1st metatarsal phalangeal joint.     Musculoskeletal: Manual Muscle Testing is 5/5 with dorsiflexion, plantar flexion, abduction, and adduction.   Deformity present to the right foot.     Dermatological:  Skin is thin, shiny, and atrophic.  The previous ulceration present to the plantar aspect of the right foot no signs of callusing or ulceration present.  No edema or erythema.    Imaging:     Impressions   02/14/2023 10:07 AM CST    1.  Acute fracture of the head of the third metatarsal with poorly defined bony  margin suggesting acute osteomyelitis.  There also may be involvement of the  proximal phalanx of the middle toe.  2.  Subluxations of the first and second rays as described which could occur  with degenerative change or early neuropathic joint.       Imaging Results - " X-ray foot right complete (02/14/2023 10:07 AM CST)  Narrative   02/14/2023 10:07 AM CST    EXAM: XR FOOT COMP RIGHT MIN 3 VIEWS  CLINICAL HISTORY: Cellulitis.  FINDINGS: There is soft tissue swelling of the forefoot with thickening of the  skin and extensive cutaneous edema.  There is erosion of the head of the third  metatarsal with a pathologic fracture.  The distal fracture fragment is  deviated laterally.  There is somewhat poorly defined margins of the proximal  most aspect of the proximal phalanx of the middle toe.  No definite additional  osseous erosion.  There is dislocation of the second metatarsophalangeal joint  with the second toe deviated medially.  Hallux valgus deformity also with  subluxation of the interphalangeal joint of the great toe medially.  No soft  tissue gas.  No radiopaque foreign bodies.  Dictated and Electronically Signed By: Jack Logan MD on February 14, 2023         Assessment:     1. History of foot ulcer    2. Skin ulcer of foot, right, with fat layer exposed    3. Crossover toe deformity of right foot        Plan:     History of foot ulcer    Skin ulcer of foot, right, with fat layer exposed    Crossover toe deformity of right foot    Thorough discussion is had with the patient today, concerning the diagnosis, its etiology, and the treatment algorithm at present.    Foot counseling and education is provided at this visit. Patient is advised to wear socks and shoes at all times.  Do not walk barefoot, or with just socks, even when indoors.  Be sure to check and inspect the inside of the shoe before putting them on her feet.  Protect your feet at all times.  Walking shoes and/or athletic shoes are the best types of shoe gear. Do not wear vinyl or plastic type shoe gear, as they do not stretch and/or breathe.  Protect your feet from hot and/or cold. Elevate the extremities when sitting.  Do not wear excessively tight socks and/or shoe gear. Wiggle your toes for a few minutes  throughout the day. Move your ankles up and down, in and out, to help blood flow in your lower extremity.   Postoperative shoe was fashioned with 1/4 inch felt padding to completely offload the area of ulceration.  Patient will utilize shoe at all times while in the hallux or outside of the house        Future Appointments   Date Time Provider Department Center   5/29/2023 11:20 AM Samuel Mares MD [facility] CARDIO  Medical C

## 2023-04-12 NOTE — PROGRESS NOTES
Subjective:   Patient ID:  Marysol Yancey is a 84 y.o. female who presents for follow-up of Follow-up (needs meds refilled)  Patient denies CP, angina or anginal equivalent.AICD last checked 2016 . Pt does not want AICD checked, Risks and benefits explained.  Echo-2013 nml lv function.  Hypertension   This is a chronic problem. The current episode started more than 1 year ago. The problem has been gradually improving since onset. The problem is controlled. Pertinent negatives include no chest pain, palpitations or shortness of breath. Past treatments include beta blockers, diuretics and central alpha agonists. The current treatment provides moderate improvement. Compliance problems include exercise.    Coronary Artery Disease   Presents for follow-up visit. Pertinent negatives include no chest pain, chest pressure, chest tightness, dizziness, leg swelling, muscle weakness, palpitations, shortness of breath or weight gain. The symptoms have been stable. Compliance with diet is variable. Compliance with exercise is variable. Compliance with medications is good.       Review of Systems   Constitution: Negative. Negative for weight gain.   HENT: Negative.    Eyes: Negative.    Cardiovascular: Negative.  Negative for chest pain, leg swelling and palpitations.   Respiratory: Negative.  Negative for chest tightness and shortness of breath.    Endocrine: Negative.    Hematologic/Lymphatic: Negative.    Skin: Negative.    Musculoskeletal: Negative for muscle weakness.   Gastrointestinal: Negative.    Genitourinary: Negative.    Neurological: Negative.  Negative for dizziness.   Psychiatric/Behavioral: Negative.    Allergic/Immunologic: Negative.      Family History   Problem Relation Age of Onset    Heart attack Father     Hyperlipidemia Father     Hypertension Father     Anemia Neg Hx     Arrhythmia Neg Hx     Asthma Neg Hx     Clotting disorder Neg Hx     Fainting Neg Hx     Heart disease Neg Hx     Heart failure  Neg Hx     Hypertension Daughter      Past Medical History:   Diagnosis Date    Abnormal ECG 12/17/2014    AICD (automatic cardioverter/defibrillator) present 6/14/2013    CAD (coronary artery disease) 6/14/2013    Cancer     basil cell carcinoma    Cardiac arrest 6/14/2013    CHF (congestive heart failure)     CRI (chronic renal insufficiency) 6/14/2013    Depression 6/14/2013    Diabetes mellitus     Fatigue 6/14/2013    Heart block     Hyperlipidemia     Hypertension     LBBB (left bundle branch block) 12/17/2014    Mitral regurgitation 6/14/2013    Mixed hyperlipidemia 6/14/2013    Pulmonary nodule 6/14/2013    Thyroid disease     TR (tricuspid regurgitation) 6/14/2013    Ventricular fibrillation 6/14/2013     Current Outpatient Prescriptions on File Prior to Visit   Medication Sig Dispense Refill    aspirin (ECOTRIN) 81 MG EC tablet Take 81 mg by mouth once daily.      cloNIDine (CATAPRES) 0.2 MG tablet Take 1 tablet by mouth every evening.      levothyroxine (SYNTHROID) 75 MCG tablet Take 75 mcg by mouth once daily.      naproxen sodium (ALEVE) 220 mg Cap Take 1 tablet by mouth every evening. Aleve PM      triamterene-hydrochlorothiazide 37.5-25 mg (MAXZIDE-25) 37.5-25 mg per tablet Take 0.5 tablets by mouth once daily.      fluoxetine (PROZAC) 20 MG tablet Take 20 mg by mouth once daily.      [DISCONTINUED] DEXTROMETHORPHAN HBR/CHLOR-MAL (CORICIDIN HBP COUGH & COLD ORAL) Take by mouth. PRN      [DISCONTINUED] fluticasone (FLONASE) 50 mcg/actuation nasal spray 1 spray by Each Nare route daily as needed.       [DISCONTINUED] multivitamin (ONE DAILY MULTIVITAMIN) per tablet Take 1 tablet by mouth once daily.       No current facility-administered medications on file prior to visit.      Review of patient's allergies indicates:   Allergen Reactions    Zetia [ezetimibe]     Atorvastatin     Avelox [moxifloxacin]     Erythromycin     Lisinopril     Norvasc [amlodipine]     Pcn  [penicillins]     Statins-hmg-coa reductase inhibitors        Objective:     Physical Exam   Constitutional: She is oriented to person, place, and time. She appears well-developed and well-nourished.   HENT:   Head: Normocephalic and atraumatic.   Eyes: Conjunctivae and EOM are normal. Pupils are equal, round, and reactive to light.   Neck: Normal range of motion. Neck supple.   Cardiovascular: Normal rate, regular rhythm, normal heart sounds and intact distal pulses.    Pulmonary/Chest: Effort normal and breath sounds normal.   Abdominal: Soft. Bowel sounds are normal.   Musculoskeletal: Normal range of motion.   Neurological: She is alert and oriented to person, place, and time.   Skin: Skin is warm and dry.   Psychiatric: She has a normal mood and affect.   Nursing note and vitals reviewed.      Assessment:     1. AICD (automatic cardioverter/defibrillator) present    2. Coronary artery disease involving native coronary artery of native heart without angina pectoris    3. Essential hypertension    4. Mixed hyperlipidemia        Plan:     AICD (automatic cardioverter/defibrillator) present    Coronary artery disease involving native coronary artery of native heart without angina pectoris    Essential hypertension    Mixed hyperlipidemia    continue clonidine, hctz, coreg -htn  Continue asa- cad       PMD

## 2023-08-01 ENCOUNTER — HOSPITAL ENCOUNTER (EMERGENCY)
Facility: HOSPITAL | Age: 88
Discharge: HOME | End: 2023-08-01
Attending: FAMILY MEDICINE
Payer: MEDICARE

## 2023-08-01 VITALS
OXYGEN SATURATION: 98 % | DIASTOLIC BLOOD PRESSURE: 97 MMHG | HEART RATE: 79 BPM | WEIGHT: 149.06 LBS | BODY MASS INDEX: 28.14 KG/M2 | SYSTOLIC BLOOD PRESSURE: 192 MMHG | HEIGHT: 61 IN | RESPIRATION RATE: 16 BRPM | TEMPERATURE: 98 F

## 2023-08-01 DIAGNOSIS — S00.03XA HEMATOMA OF SCALP, INITIAL ENCOUNTER: Primary | ICD-10-CM

## 2023-08-01 DIAGNOSIS — R53.1 WEAKNESS: ICD-10-CM

## 2023-08-01 PROCEDURE — 93010 EKG 12-LEAD: ICD-10-PCS | Mod: ,,, | Performed by: INTERNAL MEDICINE

## 2023-08-01 PROCEDURE — 93010 ELECTROCARDIOGRAM REPORT: CPT | Mod: ,,, | Performed by: INTERNAL MEDICINE

## 2023-08-01 PROCEDURE — 25000003 PHARM REV CODE 250: Performed by: FAMILY MEDICINE

## 2023-08-01 PROCEDURE — 99284 EMERGENCY DEPT VISIT MOD MDM: CPT | Mod: 25

## 2023-08-01 PROCEDURE — 93005 ELECTROCARDIOGRAM TRACING: CPT

## 2023-08-01 RX ORDER — ACETAMINOPHEN 500 MG
1000 TABLET ORAL
Status: DISCONTINUED | OUTPATIENT
Start: 2023-08-01 | End: 2023-08-02 | Stop reason: HOSPADM

## 2023-08-01 RX ORDER — IBUPROFEN 600 MG/1
600 TABLET ORAL
Status: COMPLETED | OUTPATIENT
Start: 2023-08-01 | End: 2023-08-01

## 2023-08-01 RX ADMIN — IBUPROFEN 600 MG: 600 TABLET, FILM COATED ORAL at 10:08

## 2023-08-02 NOTE — ED PROVIDER NOTES
SCRIBE #1 NOTE: I, Manju Coley, am scribing for, and in the presence of, Alicia Stacy MD. I have scribed the entire note.       History     Chief Complaint   Patient presents with    Fall     To ED via [facility]. Pt fell while cooking and struck head. No LOC or blood thinners. Pt has lac to occipital region bandaged and bleeding controlled. Pt hx inner ear and balance issues. Pt hypertensive at 222/130      Review of patient's allergies indicates:   Allergen Reactions    Zetia [ezetimibe]     Atorvastatin     Avelox [moxifloxacin]     Erythromycin     Lisinopril     Norvasc [amlodipine]     Pcn [penicillins]     Statins-hmg-coa reductase inhibitors     Cefepime Rash         History of Present Illness     HPI    8/1/2023, 10:20 PM  History obtained from the patient      History of Present Illness: Marysol Yancey is a 89 y.o. female patient with a PMHx of CHF, cancer, HTN, DM, HLD, CAD and CRI who presents to the Emergency Department for evaluation after a fall today. Pt states she walks with a walker and reports she let go of the walker to hold a plate and fell and hit her head on the tile floor. Pt denies syncope. Pt denies being on blood thinners.  Associated sxs include HA. Symptoms are constant and moderate in severity. No mitigating or exacerbating factors reported. No further complaints or concerns at this time.       Arrival mode: Ambulance service    PCP: Bruno Nugent MD        Past Medical History:  Past Medical History:   Diagnosis Date    Abnormal ECG 12/17/2014    AICD (automatic cardioverter/defibrillator) present 6/14/2013    CAD (coronary artery disease) 6/14/2013    Cancer     basil cell carcinoma    Cardiac arrest 6/14/2013    CHF (congestive heart failure)     CRI (chronic renal insufficiency) 6/14/2013    Depression 6/14/2013    Diabetes mellitus     Fatigue 6/14/2013    Heart block     Hyperlipidemia     Hypertension     LBBB (left bundle branch block) 12/17/2014    Mitral regurgitation  6/14/2013    Mixed hyperlipidemia 6/14/2013    Pulmonary nodule 6/14/2013    Thyroid disease     TR (tricuspid regurgitation) 6/14/2013    Ventricular fibrillation 6/14/2013       Past Surgical History:  No past surgical history on file.      Family History:  Family History   Problem Relation Age of Onset    Heart attack Father     Hyperlipidemia Father     Hypertension Father     Anemia Neg Hx     Arrhythmia Neg Hx     Asthma Neg Hx     Clotting disorder Neg Hx     Fainting Neg Hx     Heart disease Neg Hx     Heart failure Neg Hx     Hypertension Daughter        Social History:  Social History     Tobacco Use    Smoking status: Never    Smokeless tobacco: Never   Substance and Sexual Activity    Alcohol use: Yes     Alcohol/week: 7.0 standard drinks of alcohol     Types: 7 Glasses of wine per week    Drug use: No    Sexual activity: Not on file        Review of Systems     Review of Systems   Neurological:  Positive for headaches. Negative for syncope.      Physical Exam     Initial Vitals [08/01/23 2021]   BP Pulse Resp Temp SpO2   (!) 222/130 87 16 98.8 °F (37.1 °C) 98 %      MAP       --          Physical Exam  Nursing Notes and Vital Signs Reviewed.  Constitutional: Patient is in no acute distress. Well-developed and well-nourished.  Head: Scalp contusion oozing blood.   Eyes: PERRL. EOM intact. Conjunctivae are not pale. No scleral icterus.  ENT: Mucous membranes are moist. Oropharynx is clear and symmetric.    Neck: Supple. Full ROM. No lymphadenopathy.  Cardiovascular: Regular rate. Regular rhythm. No murmurs, rubs, or gallops. Distal pulses are 2+ and symmetric.  Pulmonary/Chest: No respiratory distress. Clear to auscultation bilaterally. No wheezing or rales.  Abdominal: Soft and non-distended.  There is no tenderness.  No rebound, guarding, or rigidity.  Genitourinary: No CVA tenderness  Musculoskeletal: Moves all extremities. No obvious deformities. No edema.   Skin: Warm and dry.  Neurological:  Alert,  "awake, and appropriate.  Normal speech.  No acute focal neurological deficits are appreciated.  Psychiatric: Normal affect. Good eye contact. Appropriate in content.     ED Course   Procedures  ED Vital Signs:  Vitals:    08/01/23 2021 08/01/23 2109 08/01/23 2135 08/01/23 2229   BP: (!) 222/130 (!) 223/100 (!) 209/102 (!) 192/97   Pulse: 87 84 83 79   Resp: 16 16 17 16   Temp: 98.8 °F (37.1 °C)   98.2 °F (36.8 °C)   TempSrc: Oral   Oral   SpO2: 98% 99% 98% 98%   Weight:  67.6 kg (149 lb 0.5 oz)     Height: 5' 1" (1.549 m)          Abnormal Lab Results:  Labs Reviewed - No data to display       All Lab Results:  None    Imaging Results:  Imaging Results              CT Head Without Contrast (Final result)  Result time 08/01/23 21:32:11      Final result by Jason Garsia MD (08/01/23 21:32:11)                   Impression:      No intracranial hemorrhage mass effect or midline shift.  Mild moderate left posterior high convexity scalp hematoma.    All CT scans   are performed using dose optimization techniques including the following: automated exposure control; adjustment of the mA and/or kV; use of iterative reconstruction technique.  Dose modulation was employed for ALARA by means of: Automated exposure control; adjustment of the mA and/or kV according to patient size (this includes techniques or standardized protocols for targeted exams where dose is matched to indication/reason for exam; i.e. extremities or head); and/or use of iterative reconstructive technique.      Electronically signed by: Jason Garsia  Date:    08/01/2023  Time:    21:32               Narrative:    EXAMINATION:  CT HEAD WITHOUT CONTRAST    CLINICAL HISTORY:  Head trauma, moderate-severe;    TECHNIQUE:  Low dose axial CT images obtained throughout the head without intravenous contrast. Sagittal and coronal reconstructions were performed.    COMPARISON:  None.    FINDINGS:  Atrophy and chronic white matter changes.  No extra-axial blood or " fluid collections.    No parenchymal mass, hemorrhage, edema or major vascular distribution infarct.    Skull/extracranial contents (limited evaluation): No fracture. Mastoid air cells and paranasal sinuses are essentially clear.    Mild moderate left posterior high convexity scalp hematoma                                       The EKG was ordered, reviewed, and independently interpreted by the ED provider.  Interpretation time: 20:59  Rate: 80 BPM  Rhythm: normal sinus rhythm with sinus arrythmia  Interpretation: LVH with QRS widening and repolarization abnormality ( [place] product ) Cannot rule out Anterior infarct ,age undetermined. No STEMI.             The Emergency Provider reviewed the vital signs and test results, which are outlined above.     ED Discussion     10:28 PM: Reassessed pt at this time.  Discussed with pt all pertinent ED information and results. Discussed pt dx and plan of tx. Gave pt all f/u and return to the ED instructions. All questions and concerns were addressed at this time. Pt expresses understanding of information and instructions, and is comfortable with plan to discharge. Pt is stable for discharge.    I discussed with patient and/or family/caretaker that evaluation in the ED does not suggest any emergent or life threatening medical conditions requiring immediate intervention beyond what was provided in the ED, and I believe patient is safe for discharge.  Regardless, an unremarkable evaluation in the ED does not preclude the development or presence of a serious of life threatening condition. As such, patient was instructed to return immediately for any worsening or change in current symptoms.         Medical Decision Making:   Clinical Tests:   Radiological Study: Ordered and Reviewed           ED Medication(s):  Medications   ibuprofen tablet 600 mg (600 mg Oral Given 8/1/23 2205)       Discharge Medication List as of 8/1/2023 10:20 PM           Follow-up Information       Schedule  an appointment as soon as possible for a visit  with Bruno Nugent MD.    Specialty: Internal Medicine  Why: As needed  Contact information:  [id number] [address]  [phone]                                 Scribe Attestation:   Scribe #1: I performed the above scribed service and the documentation accurately describes the services I performed. I attest to the accuracy of the note.     Attending:   Physician Attestation Statement for Scribe #1: I, Alicia Stacy MD, personally performed the services described in this documentation, as scribed by Manju Coley, in my presence, and it is both accurate and complete.           Clinical Impression       ICD-10-CM ICD-9-CM   1. Hematoma of scalp, initial encounter  S00.03XA 920   2. Weakness  R53.1 780.79       Disposition:   Disposition: Discharged  Condition: Stable         Alicia Stacy MD  08/04/23 [id number]

## 2023-08-07 ENCOUNTER — HOSPITAL ENCOUNTER (EMERGENCY)
Facility: HOSPITAL | Age: 88
Discharge: HOME | End: 2023-08-07
Attending: EMERGENCY MEDICINE
Payer: MEDICARE

## 2023-08-07 VITALS
SYSTOLIC BLOOD PRESSURE: 205 MMHG | DIASTOLIC BLOOD PRESSURE: 99 MMHG | WEIGHT: 141 LBS | BODY MASS INDEX: 26.64 KG/M2 | RESPIRATION RATE: 19 BRPM | TEMPERATURE: 98 F | OXYGEN SATURATION: 99 % | HEART RATE: 88 BPM

## 2023-08-07 DIAGNOSIS — I50.9 CHF (CONGESTIVE HEART FAILURE): ICD-10-CM

## 2023-08-07 DIAGNOSIS — F41.9 ANXIETY: ICD-10-CM

## 2023-08-07 DIAGNOSIS — R09.81 NASAL CONGESTION: Primary | ICD-10-CM

## 2023-08-07 DIAGNOSIS — I10 CHRONIC HYPERTENSION: ICD-10-CM

## 2023-08-07 LAB — SARS-COV-2 RDRP RESP QL NAA+PROBE: NEGATIVE

## 2023-08-07 PROCEDURE — 99283 EMERGENCY DEPT VISIT LOW MDM: CPT | Mod: 25

## 2023-08-07 PROCEDURE — U0002 COVID-19 LAB TEST NON-CDC: HCPCS | Performed by: EMERGENCY MEDICINE

## 2023-08-07 PROCEDURE — 25000242 PHARM REV CODE 250 ALT 637 W/ HCPCS: Performed by: EMERGENCY MEDICINE

## 2023-08-07 PROCEDURE — 25000003 PHARM REV CODE 250: Performed by: EMERGENCY MEDICINE

## 2023-08-07 RX ORDER — OXYMETAZOLINE HCL 0.05 %
1 SPRAY, NON-AEROSOL (ML) NASAL
Status: COMPLETED | OUTPATIENT
Start: 2023-08-07 | End: 2023-08-07

## 2023-08-07 RX ORDER — FLUTICASONE PROPIONATE 50 MCG
1 SPRAY, SUSPENSION (ML) NASAL 2 TIMES DAILY PRN
Qty: 15 G | Refills: 0 | Status: SHIPPED | OUTPATIENT
Start: 2023-08-07

## 2023-08-07 RX ORDER — HYDROXYZINE PAMOATE 25 MG/1
25 CAPSULE ORAL
Status: COMPLETED | OUTPATIENT
Start: 2023-08-07 | End: 2023-08-07

## 2023-08-07 RX ORDER — CETIRIZINE HYDROCHLORIDE 10 MG/1
10 TABLET ORAL DAILY
Qty: 30 TABLET | Refills: 0 | Status: SHIPPED | OUTPATIENT
Start: 2023-08-07 | End: 2024-08-06

## 2023-08-07 RX ORDER — FLUTICASONE PROPIONATE 50 MCG
2 SPRAY, SUSPENSION (ML) NASAL DAILY
Status: DISCONTINUED | OUTPATIENT
Start: 2023-08-07 | End: 2023-08-07 | Stop reason: HOSPADM

## 2023-08-07 RX ORDER — CLONIDINE HYDROCHLORIDE 0.2 MG/1
0.2 TABLET ORAL
Status: COMPLETED | OUTPATIENT
Start: 2023-08-07 | End: 2023-08-07

## 2023-08-07 RX ADMIN — Medication 1 SPRAY: at 06:08

## 2023-08-07 RX ADMIN — CLONIDINE HYDROCHLORIDE 0.2 MG: 0.2 TABLET ORAL at 07:08

## 2023-08-07 RX ADMIN — HYDROXYZINE PAMOATE 25 MG: 25 CAPSULE ORAL at 06:08

## 2023-08-07 RX ADMIN — FLUTICASONE PROPIONATE 100 MCG: 50 SPRAY, METERED NASAL at 06:08

## 2023-08-07 NOTE — ED PROVIDER NOTES
SCRIBE #1 NOTE: I, Jessica Brennan, am scribing for, and in the presence of, Shital Sunshine MD. I have scribed the entire note.       History     Chief Complaint   Patient presents with    congestion     States her oral secretions are thicker and she's having a hard time getting them up x 3 days. Attempting to get secretions out but unable. Able to eat and drink normally, denies cough, denies shortness of breath.     Review of patient's allergies indicates:   Allergen Reactions    Zetia [ezetimibe]     Atorvastatin     Avelox [moxifloxacin]     Erythromycin     Lisinopril     Norvasc [amlodipine]     Pcn [penicillins]     Statins-hmg-coa reductase inhibitors     Cefepime Rash    Tylenol [acetaminophen] Rash         History of Present Illness     HPI    8/7/2023, 6:39 PM  History obtained from the patient      History of Present Illness: Marysol Yancey is a 89 y.o. female patient with a PMHx of CHF, HTN, DM who presents to the Emergency Department for evaluation of congestion which onset over the past few days. Pt complains of nasal congestion, cough, and SOB. Pt reported she was here last Tuesday for a fall that she hit her head. Symptoms are constant and moderate in severity. No mitigating or exacerbating factors reported. Patient denies any chest pain, dysuria, weakness, dizziness, n/v/d, and all other sxs at this time. Prior Tx reported. No further complaints or concerns at this time.       Arrival mode: Personal vehicle      PCP: Bruno Nugent MD        Past Medical History:  Past Medical History:   Diagnosis Date    Abnormal ECG 12/17/2014    AICD (automatic cardioverter/defibrillator) present 6/14/2013    CAD (coronary artery disease) 6/14/2013    Cancer     basil cell carcinoma    Cardiac arrest 6/14/2013    CHF (congestive heart failure)     CRI (chronic renal insufficiency) 6/14/2013    Depression 6/14/2013    Diabetes mellitus     Fatigue 6/14/2013    Heart block     Hyperlipidemia      Hypertension     LBBB (left bundle branch block) 12/17/2014    Mitral regurgitation 6/14/2013    Mixed hyperlipidemia 6/14/2013    Pulmonary nodule 6/14/2013    Thyroid disease     TR (tricuspid regurgitation) 6/14/2013    Ventricular fibrillation 6/14/2013       Past Surgical History:  No past surgical history on file.      Family History:  Family History   Problem Relation Age of Onset    Heart attack Father     Hyperlipidemia Father     Hypertension Father     Anemia Neg Hx     Arrhythmia Neg Hx     Asthma Neg Hx     Clotting disorder Neg Hx     Fainting Neg Hx     Heart disease Neg Hx     Heart failure Neg Hx     Hypertension Daughter        Social History:  Social History     Tobacco Use    Smoking status: Never    Smokeless tobacco: Never   Substance and Sexual Activity    Alcohol use: Yes     Alcohol/week: 7.0 standard drinks of alcohol     Types: 7 Glasses of wine per week    Drug use: No    Sexual activity: Not on file        Review of Systems     Review of Systems   Constitutional:  Negative for fever.   HENT:  Positive for congestion and postnasal drip. Negative for sore throat.    Respiratory:  Positive for cough and shortness of breath.    Cardiovascular:  Negative for chest pain.   Gastrointestinal:  Negative for diarrhea, nausea and vomiting.   Genitourinary:  Negative for dysuria.   Musculoskeletal:  Negative for back pain.   Skin:  Negative for rash.   Neurological:  Negative for dizziness and weakness.   Hematological:  Does not bruise/bleed easily.   All other systems reviewed and are negative.       Physical Exam     Initial Vitals [08/07/23 1752]   BP Pulse Resp Temp SpO2   (!) 262/122 74 20 98.6 °F (37 °C) 98 %      MAP       --          Physical Exam  Nursing Notes and Vital Signs Reviewed.  Constitutional: Patient is Anxious. Elderly.   Head: Atraumatic. Normocephalic.  Eyes: PERRL. EOM intact. Conjunctivae are not pale. No scleral icterus.  ENT: Mucous membranes are moist. Oropharynx is  clear and symmetric.    Neck: Supple. Full ROM. No lymphadenopathy.  Cardiovascular: Regular rate. Regular rhythm. No murmurs, rubs, or gallops. Distal pulses are 2+ and symmetric.  Pulmonary/Chest: No respiratory distress. Clear to auscultation bilaterally. No wheezing or rales.  Abdominal: Soft and non-distended.  There is no tenderness.  No rebound, guarding, or rigidity. Good bowel sounds.  Genitourinary: No CVA tenderness  Musculoskeletal: Moves all extremities. No obvious deformities. No edema. No calf tenderness.  Skin: Warm and dry.  Neurological:  Alert, awake, and appropriate.  Normal speech.  No acute focal neurological deficits are appreciated.  Psychiatric: Normal affect. Good eye contact. Appropriate in content.     ED Course   Procedures  ED Vital Signs:  Vitals:    08/07/23 1752 08/07/23 1930 08/07/23 1952   BP: (!) 262/122  (!) 248/111   Pulse: 74  95   Resp: 20  18   Temp: 98.6 °F (37 °C)     TempSrc: Oral     SpO2: 98%  98%   Weight:  64 kg (141 lb)        Abnormal Lab Results:  Labs Reviewed   SARS-COV-2 RNA AMPLIFICATION, QUAL        All Lab Results:  Results for orders placed or performed during the hospital encounter of 08/07/23   COVID-19 Rapid Screening   Result Value Ref Range    SARS-CoV-2 RNA, Amplification, Qual Negative Negative        Imaging Results:  Imaging Results              X-Ray Chest AP Portable (Final result)  Result time 08/07/23 19:44:26      Final result by Jayson Clark MD (08/07/23 19:44:26)                   Impression:      No acute abnormality.      Electronically signed by: Jayson Clark  Date:    08/07/2023  Time:    19:44               Narrative:    EXAMINATION:  XR CHEST AP PORTABLE    CLINICAL HISTORY:  Heart failure, unspecified    TECHNIQUE:  Single frontal view of the chest was performed.    COMPARISON:  None    FINDINGS:  Left chest cardiac pacing device.The lungs are clear, with normal appearance of pulmonary vasculature and no pleural effusion or  pneumothorax.    The cardiac silhouette is normal in size. The hilar and mediastinal contours are unremarkable.    Bones are intact.                                          ED Discussion       6:44 PM: Patient was informed of high blood pressure and refused to allow the nurse to record a measurement again.     7:57 PM: Reassessed pt at this time. Discussed with pt all pertinent ED information and results. Discussed pt dx and plan of tx. Gave pt all f/u and return to the ED instructions. All questions and concerns were addressed at this time. Pt expresses understanding of information and instructions, and is comfortable with plan to discharge. Pt is stable for discharge.    I discussed with patient and/or family/caretaker that evaluation in the ED does not suggest any emergent or life threatening medical conditions requiring immediate intervention beyond what was provided in the ED, and I believe patient is safe for discharge.  Regardless, an unremarkable evaluation in the ED does not preclude the development or presence of a serious of life threatening condition. As such, patient was instructed to return immediately for any worsening or change in current symptoms.      Medical Decision Making:   Differential Diagnosis:   Covid  Pneumonia  URI  Anxiety  Chronic Uncontrolled HTN  White Coat HTN  Clinical Tests:   Lab Tests: Ordered and Reviewed  Radiological Study: Ordered and Reviewed  ED Management:  Presents with URI symptoms, post nasal drip and increased mucous production, patient very anxious but non-ill appearing, of note has chronic htn, staets she has white coat htn and BP is at baseline for her, given her usual clonidine dose in the ER with improvement, CXR reviewed and otherwise normal, covid negative, no indication for further workup, will tx with nasal sprays.            ED Medication(s):  Medications   fluticasone propionate 50 mcg/actuation nasal spray 100 mcg (100 mcg Each Nostril Given 8/7/23 5187)    hydrOXYzine pamoate capsule 25 mg (25 mg Oral Given 8/7/23 1833)   oxymetazoline 0.05 % nasal spray 1 spray (1 spray Each Nostril Given 8/7/23 1836)   cloNIDine tablet 0.2 mg (0.2 mg Oral Given 8/7/23 1952)       New Prescriptions    CETIRIZINE (ZYRTEC) 10 MG TABLET    Take 1 tablet (10 mg total) by mouth once daily.    FLUTICASONE PROPIONATE (FLONASE) 50 MCG/ACTUATION NASAL SPRAY    1 spray (50 mcg total) by Each Nostril route 2 (two) times daily as needed for Rhinitis.        Follow-up Information       Otolaryngology. Schedule an appointment as soon as possible for a visit in 2 days.    Specialty: Otolaryngology  Why: return to the emergency room, If symptoms worsen  Contact information:  [address] [place] [address] [id number] [phone]                               Scribe Attestation:   Scribe #1: I performed the above scribed service and the documentation accurately describes the services I performed. I attest to the accuracy of the note.     Attending:   Physician Attestation Statement for Scribe #1: I, Shital Sunshine MD, personally performed the services described in this documentation, as scribed by Jessica Brennan, in my presence, and it is both accurate and complete.           Clinical Impression       ICD-10-CM ICD-9-CM   1. Nasal congestion  R09.81 478.19   2. CHF (congestive heart failure)  I50.9 428.0   3. Chronic hypertension  I10 401.9   4. Anxiety  F41.9 300.00       Disposition:   Disposition: Discharged  Condition: Stable        Shital Sunshine MD  08/08/23 1203

## 2023-08-27 ENCOUNTER — HOSPITAL ENCOUNTER (EMERGENCY)
Facility: HOSPITAL | Age: 88
Discharge: HOME | End: 2023-08-27
Attending: EMERGENCY MEDICINE
Payer: MEDICARE

## 2023-08-27 VITALS
SYSTOLIC BLOOD PRESSURE: 211 MMHG | HEART RATE: 78 BPM | BODY MASS INDEX: 26.64 KG/M2 | OXYGEN SATURATION: 95 % | RESPIRATION RATE: 20 BRPM | HEIGHT: 61 IN | TEMPERATURE: 98 F | DIASTOLIC BLOOD PRESSURE: 111 MMHG

## 2023-08-27 DIAGNOSIS — F41.9 ANXIETY: ICD-10-CM

## 2023-08-27 DIAGNOSIS — I10 CHRONIC HYPERTENSION: ICD-10-CM

## 2023-08-27 DIAGNOSIS — K11.9 SALIVARY DISORDER: Primary | ICD-10-CM

## 2023-08-27 PROCEDURE — 99281 EMR DPT VST MAYX REQ PHY/QHP: CPT

## 2023-08-27 NOTE — ED PROVIDER NOTES
SCRIBE #1 NOTE: I, Henna Vital, am scribing for, and in the presence of, Shital Sunshine MD. I have scribed the entire note.      History      Chief Complaint   Patient presents with    Salivary Problem     Pt reports excessive saliva production x 2 weeks       Review of patient's allergies indicates:   Allergen Reactions    Zetia [ezetimibe]     Atorvastatin     Avelox [moxifloxacin]     Erythromycin     Lisinopril     Norvasc [amlodipine]     Pcn [penicillins]     Statins-hmg-coa reductase inhibitors     Cefepime Rash    Tylenol [acetaminophen] Rash        HPI   HPI    8/27/2023, 4:26 PM   History obtained from the patient and the pt's daughter at bedside      History of Present Illness: Marysol Yancey is a 89 y.o. female patient with a PMHx of AICD, CAD, CHF, DM, HLD, HTN, thyroid disease, and TR who presents to the Emergency Department for increased salivary production which onset 3 weeks ago. Pt was seen in the ER on 8/7/23 for a similar complaint and was advised to f/u with ENT, but the pt has not made an appointment to see an ENT doctor. Pt has been using flonase with no relief. Symptoms are constant and moderate in severity. No mitigating or exacerbating factors reported. No associated sxs reported. Patient denies any fever, chills, CP, SOB, weakness, and all other sxs at this time. No further complaints or concerns at this time.         Arrival mode: Personal vehicle     PCP: Bruno Nugent MD       Past Medical History:  Past Medical History:   Diagnosis Date    Abnormal ECG 12/17/2014    AICD (automatic cardioverter/defibrillator) present 6/14/2013    CAD (coronary artery disease) 6/14/2013    Cancer     basil cell carcinoma    Cardiac arrest 6/14/2013    CHF (congestive heart failure)     CRI (chronic renal insufficiency) 6/14/2013    Depression 6/14/2013    Diabetes mellitus     Fatigue 6/14/2013    Heart block     Hyperlipidemia     Hypertension     LBBB (left bundle branch block) 12/17/2014     Mitral regurgitation 6/14/2013    Mixed hyperlipidemia 6/14/2013    Pulmonary nodule 6/14/2013    Thyroid disease     TR (tricuspid regurgitation) 6/14/2013    Ventricular fibrillation 6/14/2013       Past Surgical History:  No past surgical history on file.      Family History:  Family History   Problem Relation Age of Onset    Heart attack Father     Hyperlipidemia Father     Hypertension Father     Anemia Neg Hx     Arrhythmia Neg Hx     Asthma Neg Hx     Clotting disorder Neg Hx     Fainting Neg Hx     Heart disease Neg Hx     Heart failure Neg Hx     Hypertension Daughter        Social History:  Social History     Tobacco Use    Smoking status: Never    Smokeless tobacco: Never   Substance and Sexual Activity    Alcohol use: Yes     Alcohol/week: 7.0 standard drinks of alcohol     Types: 7 Glasses of wine per week    Drug use: No    Sexual activity: Not on file       ROS   Review of Systems   Constitutional:  Negative for chills and fever.   HENT:  Negative for sore throat.         (+) increased salivary production   Respiratory:  Negative for shortness of breath.    Cardiovascular:  Negative for chest pain.   Gastrointestinal:  Negative for nausea.   Genitourinary:  Negative for dysuria.   Musculoskeletal:  Negative for back pain.   Skin:  Negative for rash.   Neurological:  Negative for weakness.   Hematological:  Does not bruise/bleed easily.   All other systems reviewed and are negative.      Physical Exam      Initial Vitals [08/27/23 1612]   BP Pulse Resp Temp SpO2   (!) 211/111 84 20 98.2 °F (36.8 °C) 98 %      MAP       --          Physical Exam  Nursing Notes and Vital Signs Reviewed.  Constitutional: Patient is in no acute distress. Well-developed and well-nourished.  Head: Atraumatic. Normocephalic.  Eyes: PERRL. EOM intact. Conjunctivae are not pale. No scleral icterus.  ENT: Mucous membranes are moist. Oropharynx is clear and symmetric.  Airway is patent. Pt is continuously spitting in a bag. No  "facial swelling, no intra oral swelling, no stridor, no drooling.   Neck: Supple. Full ROM. No lymphadenopathy.  Cardiovascular: Regular rate. Regular rhythm. No murmurs, rubs, or gallops. Distal pulses are 2+ and symmetric.  Pulmonary/Chest: No respiratory distress. Clear to auscultation bilaterally. No wheezing or rales.  Abdominal: Soft and non-distended.  There is no tenderness.  No rebound, guarding, or rigidity.  Musculoskeletal: Moves all extremities. No obvious deformities. No edema.  Skin: Warm and dry.  Neurological:  Alert, awake, and appropriate.  Normal speech.  No acute focal neurological deficits are appreciated.  Psychiatric: Anxious. Agitated. Good eye contact.    ED Course    Procedures  ED Vital Signs:  Vitals:    08/27/23 1612   BP: (!) 211/111   Pulse: 84   Resp: 20   Temp: 98.2 °F (36.8 °C)   TempSrc: Oral   SpO2: 98%   Height: 5' 1" (1.549 m)       Abnormal Lab Results:  Labs Reviewed   HIV 1 / 2 ANTIBODY   HEPATITIS C ANTIBODY   HEP C VIRUS HOLD SPECIMEN          Imaging Results:  Imaging Results    None                 The Emergency Provider reviewed the vital signs and test results, which are outlined above.    ED Discussion     4:27 PM: Reassessed pt at this time. Discussed with pt all pertinent ED information and results. Discussed pt dx and plan of tx. Gave pt all f/u and return to the ED instructions. All questions and concerns were addressed at this time. Pt expresses understanding of information and instructions, and is comfortable with plan to discharge. Pt is stable for discharge.    I discussed with patient and/or family/caretaker that evaluation in the ED does not suggest any emergent or life threatening medical conditions requiring immediate intervention beyond what was provided in the ED, and I believe patient is safe for discharge.  Regardless, an unremarkable evaluation in the ED does not preclude the development or presence of a serious of life threatening condition. As such, " patient was instructed to return immediately for any worsening or change in current symptoms.           ED Medication(s):  Medications - No data to display     Follow-up Information       Otolaryngology. Schedule an appointment as soon as possible for a visit in 2 days.    Specialty: Otolaryngology  Why: Return to the emergency Room, If symptoms worsen  Contact information:  [address]  [facility] [address]  [phone]                           New Prescriptions    No medications on file         Medical Decision Making    Medical Decision Making      Patient presents with same symptoms I saw her for on 8/7/23 in the ER, she was advised to follow up with ENT and a referral was placed, she did not follow up, she did see her PCP though on 8/9.  She continues to have increase in saliva production, no vomiting and no shortness of breath, no concerns for dehydration.  She is very anxious and has chronic htn and white coat htn.  I do not feel that there is anything else I can do for her in the ER, I am not concerned for airway obstruction, her dentition is intact, no concerns for infection.  Her BP is chronically elevated and she is not symptomatic.  Family is at bedside with patient, she has a lot of anxiety I advised her to follow up with ENT, she is stable for discharge in my opinion.                 Scribe Attestation:   Scribe #1: I performed the above scribed service and the documentation accurately describes the services I performed. I attest to the accuracy of the note.    Attending:   Physician Attestation Statement for Scribe #1: I, Shital Sunshine MD, personally performed the services described in this documentation, as scribed by Henna Vital, in my presence, and it is both accurate and complete.          Clinical Impression       ICD-10-CM ICD-9-CM   1. Salivary disorder  K11.9 527.9   2. Anxiety  F41.9 300.00   3. Chronic hypertension  I10 401.9       Disposition:   Disposition:  Discharged  Condition: Stable         Shital Sunshine MD  08/27/23 1952

## 2023-08-28 ENCOUNTER — OFFICE VISIT (OUTPATIENT)
Dept: OTOLARYNGOLOGY | Facility: CLINIC | Age: 88
End: 2023-08-28
Payer: MEDICARE

## 2023-08-28 DIAGNOSIS — R25.1 TREMOR: ICD-10-CM

## 2023-08-28 DIAGNOSIS — J32.3 CHRONIC SPHENOIDAL SINUSITIS: ICD-10-CM

## 2023-08-28 DIAGNOSIS — K11.7 SIALORRHEA: Primary | ICD-10-CM

## 2023-08-28 PROCEDURE — 99212 OFFICE O/P EST SF 10 MIN: CPT | Mod: PBBFAC | Performed by: STUDENT IN AN ORGANIZED HEALTH CARE EDUCATION/TRAINING PROGRAM

## 2023-08-28 PROCEDURE — 99204 PR OFFICE/OUTPT VISIT, NEW, LEVL IV, 45-59 MIN: ICD-10-PCS | Mod: 25,S$PBB,, | Performed by: STUDENT IN AN ORGANIZED HEALTH CARE EDUCATION/TRAINING PROGRAM

## 2023-08-28 PROCEDURE — 99999 PR PBB SHADOW E&M-EST. PATIENT-LVL II: CPT | Mod: PBBFAC,,, | Performed by: STUDENT IN AN ORGANIZED HEALTH CARE EDUCATION/TRAINING PROGRAM

## 2023-08-28 PROCEDURE — 99204 OFFICE O/P NEW MOD 45 MIN: CPT | Mod: 25,S$PBB,, | Performed by: STUDENT IN AN ORGANIZED HEALTH CARE EDUCATION/TRAINING PROGRAM

## 2023-08-28 PROCEDURE — 92511 PR NASOPHARYNGOSCOPY: ICD-10-PCS | Mod: S$PBB,,, | Performed by: STUDENT IN AN ORGANIZED HEALTH CARE EDUCATION/TRAINING PROGRAM

## 2023-08-28 PROCEDURE — 92511 NASOPHARYNGOSCOPY: CPT | Mod: 25,PBBFAC | Performed by: STUDENT IN AN ORGANIZED HEALTH CARE EDUCATION/TRAINING PROGRAM

## 2023-08-28 PROCEDURE — 99999 PR PBB SHADOW E&M-EST. PATIENT-LVL II: ICD-10-PCS | Mod: PBBFAC,,, | Performed by: STUDENT IN AN ORGANIZED HEALTH CARE EDUCATION/TRAINING PROGRAM

## 2023-08-28 PROCEDURE — 92511 NASOPHARYNGOSCOPY: CPT | Mod: S$PBB,,, | Performed by: STUDENT IN AN ORGANIZED HEALTH CARE EDUCATION/TRAINING PROGRAM

## 2023-08-28 RX ORDER — CLINDAMYCIN HYDROCHLORIDE 300 MG/1
300 CAPSULE ORAL EVERY 8 HOURS
Qty: 30 CAPSULE | Refills: 0 | Status: SHIPPED | OUTPATIENT
Start: 2023-08-28 | End: 2023-09-07

## 2023-09-05 ENCOUNTER — TELEPHONE (OUTPATIENT)
Dept: OTOLARYNGOLOGY | Facility: CLINIC | Age: 88
End: 2023-09-05
Payer: MEDICARE

## 2023-09-05 NOTE — TELEPHONE ENCOUNTER
----- Message from Farrukh Aquino sent at 9/5/2023  8:52 AM CDT -----  Name of Who is Calling: Pt daughter         What is the request in detail: Would like a call back from the office in regards to getting in on if pt needs to be seen for rash or need an derm appt . Please advise       Can the clinic reply by [facility]:NO         What Number to Call Back if not in [facility]:.Telephone Information:  Mobile          [phone]

## 2023-09-20 ENCOUNTER — HOSPITAL ENCOUNTER (OUTPATIENT)
Facility: HOSPITAL | Age: 88
Discharge: HOSPICE | End: 2023-09-21
Attending: EMERGENCY MEDICINE | Admitting: INTERNAL MEDICINE
Payer: MEDICARE

## 2023-09-20 DIAGNOSIS — R11.2 NAUSEA AND VOMITING, UNSPECIFIED VOMITING TYPE: ICD-10-CM

## 2023-09-20 DIAGNOSIS — N17.9 AKI (ACUTE KIDNEY INJURY): ICD-10-CM

## 2023-09-20 DIAGNOSIS — R07.9 CHEST PAIN: ICD-10-CM

## 2023-09-20 DIAGNOSIS — E86.0 DEHYDRATION: Primary | ICD-10-CM

## 2023-09-20 DIAGNOSIS — R79.89 ELEVATED TROPONIN: ICD-10-CM

## 2023-09-20 LAB
ALBUMIN SERPL BCP-MCNC: 4.7 G/DL (ref 3.5–5.2)
ALP SERPL-CCNC: 80 U/L (ref 55–135)
ALT SERPL W/O P-5'-P-CCNC: 14 U/L (ref 10–44)
ANION GAP SERPL CALC-SCNC: 14 MMOL/L (ref 8–16)
ANION GAP SERPL CALC-SCNC: 15 MMOL/L (ref 8–16)
AST SERPL-CCNC: 23 U/L (ref 10–40)
BACTERIA #/AREA URNS HPF: ABNORMAL /HPF
BASOPHILS # BLD AUTO: 0.08 K/UL (ref 0–0.2)
BASOPHILS NFR BLD: 0.6 % (ref 0–1.9)
BILIRUB SERPL-MCNC: 0.8 MG/DL (ref 0.1–1)
BILIRUB UR QL STRIP: NEGATIVE
BNP SERPL-MCNC: 496 PG/ML (ref 0–99)
BUN SERPL-MCNC: 36 MG/DL (ref 8–23)
BUN SERPL-MCNC: 42 MG/DL (ref 8–23)
CALCIUM SERPL-MCNC: 11.2 MG/DL (ref 8.7–10.5)
CALCIUM SERPL-MCNC: 9.2 MG/DL (ref 8.7–10.5)
CHLORIDE SERPL-SCNC: 104 MMOL/L (ref 95–110)
CHLORIDE SERPL-SCNC: 107 MMOL/L (ref 95–110)
CLARITY UR: CLEAR
CO2 SERPL-SCNC: 25 MMOL/L (ref 23–29)
CO2 SERPL-SCNC: 25 MMOL/L (ref 23–29)
COLOR UR: YELLOW
CREAT SERPL-MCNC: 1.4 MG/DL (ref 0.5–1.4)
CREAT SERPL-MCNC: 1.5 MG/DL (ref 0.5–1.4)
DIFFERENTIAL METHOD: ABNORMAL
EOSINOPHIL # BLD AUTO: 0 K/UL (ref 0–0.5)
EOSINOPHIL NFR BLD: 0.1 % (ref 0–8)
ERYTHROCYTE [DISTWIDTH] IN BLOOD BY AUTOMATED COUNT: 14.4 % (ref 11.5–14.5)
EST. GFR  (NO RACE VARIABLE): 33 ML/MIN/1.73 M^2
EST. GFR  (NO RACE VARIABLE): 36 ML/MIN/1.73 M^2
GLUCOSE SERPL-MCNC: 151 MG/DL (ref 70–110)
GLUCOSE SERPL-MCNC: 95 MG/DL (ref 70–110)
GLUCOSE UR QL STRIP: ABNORMAL
HCT VFR BLD AUTO: 56.9 % (ref 37–48.5)
HGB BLD-MCNC: 18.8 G/DL (ref 12–16)
HGB UR QL STRIP: ABNORMAL
HYALINE CASTS #/AREA URNS LPF: 1 /LPF
IMM GRANULOCYTES # BLD AUTO: 0.07 K/UL (ref 0–0.04)
IMM GRANULOCYTES NFR BLD AUTO: 0.5 % (ref 0–0.5)
KETONES UR QL STRIP: ABNORMAL
LACTATE SERPL-SCNC: 2.9 MMOL/L (ref 0.5–2.2)
LEUKOCYTE ESTERASE UR QL STRIP: NEGATIVE
LIPASE SERPL-CCNC: 15 U/L (ref 4–60)
LYMPHOCYTES # BLD AUTO: 1.1 K/UL (ref 1–4.8)
LYMPHOCYTES NFR BLD: 7.5 % (ref 18–48)
MCH RBC QN AUTO: 30.2 PG (ref 27–31)
MCHC RBC AUTO-ENTMCNC: 33 G/DL (ref 32–36)
MCV RBC AUTO: 92 FL (ref 82–98)
MICROSCOPIC COMMENT: ABNORMAL
MONOCYTES # BLD AUTO: 0.7 K/UL (ref 0.3–1)
MONOCYTES NFR BLD: 4.9 % (ref 4–15)
NEUTROPHILS # BLD AUTO: 12.4 K/UL (ref 1.8–7.7)
NEUTROPHILS NFR BLD: 86.4 % (ref 38–73)
NITRITE UR QL STRIP: NEGATIVE
NRBC BLD-RTO: 0 /100 WBC
PH UR STRIP: 8 [PH] (ref 5–8)
PLATELET # BLD AUTO: 285 K/UL (ref 150–450)
PMV BLD AUTO: 9.6 FL (ref 9.2–12.9)
POTASSIUM SERPL-SCNC: 3.7 MMOL/L (ref 3.5–5.1)
POTASSIUM SERPL-SCNC: 3.7 MMOL/L (ref 3.5–5.1)
PROT SERPL-MCNC: 8.8 G/DL (ref 6–8.4)
PROT UR QL STRIP: ABNORMAL
RBC # BLD AUTO: 6.22 M/UL (ref 4–5.4)
RBC #/AREA URNS HPF: 11 /HPF (ref 0–4)
SODIUM SERPL-SCNC: 143 MMOL/L (ref 136–145)
SODIUM SERPL-SCNC: 147 MMOL/L (ref 136–145)
SP GR UR STRIP: 1.02 (ref 1–1.03)
TROPONIN I SERPL DL<=0.01 NG/ML-MCNC: 0.1 NG/ML (ref 0–0.03)
TROPONIN I SERPL DL<=0.01 NG/ML-MCNC: 0.1 NG/ML (ref 0–0.03)
URN SPEC COLLECT METH UR: ABNORMAL
UROBILINOGEN UR STRIP-ACNC: NEGATIVE EU/DL
WBC # BLD AUTO: 14.28 K/UL (ref 3.9–12.7)
WBC #/AREA URNS HPF: 1 /HPF (ref 0–5)
YEAST URNS QL MICRO: ABNORMAL

## 2023-09-20 PROCEDURE — 80053 COMPREHEN METABOLIC PANEL: CPT | Performed by: EMERGENCY MEDICINE

## 2023-09-20 PROCEDURE — 93010 EKG 12-LEAD: ICD-10-PCS | Mod: ,,, | Performed by: INTERNAL MEDICINE

## 2023-09-20 PROCEDURE — 96375 TX/PRO/DX INJ NEW DRUG ADDON: CPT

## 2023-09-20 PROCEDURE — 25000003 PHARM REV CODE 250: Performed by: EMERGENCY MEDICINE

## 2023-09-20 PROCEDURE — 96361 HYDRATE IV INFUSION ADD-ON: CPT

## 2023-09-20 PROCEDURE — G0378 HOSPITAL OBSERVATION PER HR: HCPCS

## 2023-09-20 PROCEDURE — 83690 ASSAY OF LIPASE: CPT | Performed by: EMERGENCY MEDICINE

## 2023-09-20 PROCEDURE — 25000003 PHARM REV CODE 250: Performed by: INTERNAL MEDICINE

## 2023-09-20 PROCEDURE — 93005 ELECTROCARDIOGRAM TRACING: CPT

## 2023-09-20 PROCEDURE — P9612 CATHETERIZE FOR URINE SPEC: HCPCS

## 2023-09-20 PROCEDURE — 99285 EMERGENCY DEPT VISIT HI MDM: CPT | Mod: 25

## 2023-09-20 PROCEDURE — S5010 5% DEXTROSE AND 0.45% SALINE: HCPCS | Performed by: INTERNAL MEDICINE

## 2023-09-20 PROCEDURE — 85025 COMPLETE CBC W/AUTO DIFF WBC: CPT | Performed by: EMERGENCY MEDICINE

## 2023-09-20 PROCEDURE — 36415 COLL VENOUS BLD VENIPUNCTURE: CPT | Performed by: EMERGENCY MEDICINE

## 2023-09-20 PROCEDURE — 81000 URINALYSIS NONAUTO W/SCOPE: CPT | Performed by: EMERGENCY MEDICINE

## 2023-09-20 PROCEDURE — 80048 BASIC METABOLIC PNL TOTAL CA: CPT | Mod: XB | Performed by: NURSE PRACTITIONER

## 2023-09-20 PROCEDURE — 25000003 PHARM REV CODE 250: Performed by: NURSE PRACTITIONER

## 2023-09-20 PROCEDURE — 84484 ASSAY OF TROPONIN QUANT: CPT | Mod: 91 | Performed by: EMERGENCY MEDICINE

## 2023-09-20 PROCEDURE — 63600175 PHARM REV CODE 636 W HCPCS: Performed by: NURSE PRACTITIONER

## 2023-09-20 PROCEDURE — 83605 ASSAY OF LACTIC ACID: CPT | Performed by: EMERGENCY MEDICINE

## 2023-09-20 PROCEDURE — 83880 ASSAY OF NATRIURETIC PEPTIDE: CPT | Performed by: EMERGENCY MEDICINE

## 2023-09-20 PROCEDURE — 96374 THER/PROPH/DIAG INJ IV PUSH: CPT

## 2023-09-20 PROCEDURE — 63600175 PHARM REV CODE 636 W HCPCS: Performed by: EMERGENCY MEDICINE

## 2023-09-20 PROCEDURE — 93010 ELECTROCARDIOGRAM REPORT: CPT | Mod: ,,, | Performed by: INTERNAL MEDICINE

## 2023-09-20 PROCEDURE — 36415 COLL VENOUS BLD VENIPUNCTURE: CPT | Performed by: NURSE PRACTITIONER

## 2023-09-20 PROCEDURE — 84484 ASSAY OF TROPONIN QUANT: CPT | Performed by: EMERGENCY MEDICINE

## 2023-09-20 RX ORDER — CLONIDINE HYDROCHLORIDE 0.2 MG/1
0.2 TABLET ORAL
Status: COMPLETED | OUTPATIENT
Start: 2023-09-20 | End: 2023-09-20

## 2023-09-20 RX ORDER — IBUPROFEN 200 MG
16 TABLET ORAL
Status: DISCONTINUED | OUTPATIENT
Start: 2023-09-20 | End: 2023-09-21 | Stop reason: HOSPADM

## 2023-09-20 RX ORDER — GLUCAGON 1 MG
1 KIT INJECTION
Status: DISCONTINUED | OUTPATIENT
Start: 2023-09-20 | End: 2023-09-21 | Stop reason: HOSPADM

## 2023-09-20 RX ORDER — SODIUM CHLORIDE 0.9 % (FLUSH) 0.9 %
10 SYRINGE (ML) INJECTION EVERY 12 HOURS PRN
Status: DISCONTINUED | OUTPATIENT
Start: 2023-09-20 | End: 2023-09-21 | Stop reason: HOSPADM

## 2023-09-20 RX ORDER — NALOXONE HCL 0.4 MG/ML
0.02 VIAL (ML) INJECTION
Status: DISCONTINUED | OUTPATIENT
Start: 2023-09-20 | End: 2023-09-21 | Stop reason: HOSPADM

## 2023-09-20 RX ORDER — HYDROXYZINE PAMOATE 25 MG/1
25 CAPSULE ORAL EVERY 12 HOURS PRN
Status: DISCONTINUED | OUTPATIENT
Start: 2023-09-20 | End: 2023-09-21 | Stop reason: HOSPADM

## 2023-09-20 RX ORDER — ONDANSETRON 2 MG/ML
4 INJECTION INTRAMUSCULAR; INTRAVENOUS EVERY 6 HOURS PRN
Status: DISCONTINUED | OUTPATIENT
Start: 2023-09-20 | End: 2023-09-21 | Stop reason: HOSPADM

## 2023-09-20 RX ORDER — SODIUM CHLORIDE 9 MG/ML
INJECTION, SOLUTION INTRAVENOUS CONTINUOUS
Status: DISCONTINUED | OUTPATIENT
Start: 2023-09-20 | End: 2023-09-20

## 2023-09-20 RX ORDER — HYDROXYZINE PAMOATE 25 MG/1
25 CAPSULE ORAL EVERY 12 HOURS PRN
Status: DISCONTINUED | OUTPATIENT
Start: 2023-09-20 | End: 2023-09-20

## 2023-09-20 RX ORDER — DILTIAZEM HYDROCHLORIDE 5 MG/ML
20 INJECTION INTRAVENOUS
Status: COMPLETED | OUTPATIENT
Start: 2023-09-20 | End: 2023-09-20

## 2023-09-20 RX ORDER — IBUPROFEN 200 MG
24 TABLET ORAL
Status: DISCONTINUED | OUTPATIENT
Start: 2023-09-20 | End: 2023-09-21 | Stop reason: HOSPADM

## 2023-09-20 RX ORDER — ONDANSETRON 2 MG/ML
4 INJECTION INTRAMUSCULAR; INTRAVENOUS
Status: COMPLETED | OUTPATIENT
Start: 2023-09-20 | End: 2023-09-20

## 2023-09-20 RX ORDER — HYDRALAZINE HYDROCHLORIDE 20 MG/ML
10 INJECTION INTRAMUSCULAR; INTRAVENOUS EVERY 4 HOURS PRN
Status: DISCONTINUED | OUTPATIENT
Start: 2023-09-20 | End: 2023-09-21 | Stop reason: HOSPADM

## 2023-09-20 RX ORDER — MORPHINE SULFATE 4 MG/ML
4 INJECTION, SOLUTION INTRAMUSCULAR; INTRAVENOUS
Status: COMPLETED | OUTPATIENT
Start: 2023-09-20 | End: 2023-09-20

## 2023-09-20 RX ORDER — DEXTROSE MONOHYDRATE AND SODIUM CHLORIDE 5; .45 G/100ML; G/100ML
INJECTION, SOLUTION INTRAVENOUS CONTINUOUS
Status: DISCONTINUED | OUTPATIENT
Start: 2023-09-20 | End: 2023-09-21 | Stop reason: HOSPADM

## 2023-09-20 RX ADMIN — DEXTROSE MONOHYDRATE AND SODIUM CHLORIDE: 5; .45 INJECTION, SOLUTION INTRAVENOUS at 11:09

## 2023-09-20 RX ADMIN — ONDANSETRON 4 MG: 2 INJECTION INTRAMUSCULAR; INTRAVENOUS at 04:09

## 2023-09-20 RX ADMIN — HYDROXYZINE PAMOATE 25 MG: 25 CAPSULE ORAL at 09:09

## 2023-09-20 RX ADMIN — MORPHINE SULFATE 4 MG: 4 INJECTION INTRAVENOUS at 04:09

## 2023-09-20 RX ADMIN — DEXTROSE MONOHYDRATE AND SODIUM CHLORIDE: 5; .45 INJECTION, SOLUTION INTRAVENOUS at 05:09

## 2023-09-20 RX ADMIN — SODIUM CHLORIDE 1000 ML: 9 INJECTION, SOLUTION INTRAVENOUS at 04:09

## 2023-09-20 RX ADMIN — HYDRALAZINE HYDROCHLORIDE 10 MG: 20 INJECTION, SOLUTION INTRAMUSCULAR; INTRAVENOUS at 09:09

## 2023-09-20 RX ADMIN — CLONIDINE HYDROCHLORIDE 0.2 MG: 0.2 TABLET ORAL at 09:09

## 2023-09-20 RX ADMIN — DILTIAZEM HYDROCHLORIDE 20 MG: 5 INJECTION, SOLUTION INTRAVENOUS at 04:09

## 2023-09-20 NOTE — ED PROVIDER NOTES
SCRIBE #1 NOTE: I, Qi Cruz, am scribing for, and in the presence of, Sarah Garcia MD. I have scribed the HPI, ROS, and PEx.      SCRIBE #2 NOTE: I, Williams Cheng, am scribing for, and in the presence of,  River Swanson MD. I have scribed the remaining portions of the note not scribed by Scribe #1.      History     Chief Complaint   Patient presents with    Abdominal Pain     Abdominal pain with NV x few days that worsened today. Noncompliant with meds.     Review of patient's allergies indicates:   Allergen Reactions    Zetia [ezetimibe]     Atorvastatin     Avelox [moxifloxacin]     Erythromycin     Lisinopril     Norvasc [amlodipine]     Pcn [penicillins]     Statins-hmg-coa reductase inhibitors     Cefepime Rash    Tylenol [acetaminophen] Rash         History of Present Illness     HPI    9/20/2023, 4:51 AM  History obtained from the patient      History of Present Illness: Marysol Yancey is a 89 y.o. female patient with a PMHx of CAD s/p AICD placement (2013), CHF, basal cell carcinoma, chronic renal insufficiency, DM, HLD, and HTN who presents to the Emergency Department via [facility] for evaluation of generalized abdominal pain which onset a few days PTA, worse tonight. The pt is noncompliant with her medications. Symptoms are constant and moderate in severity. No mitigating or exacerbating factors reported. Associated sxs include N/V. Patient denies any diarrhea, constipation, CP, SOB, cough, fever, and all other sxs at this time. No prior Tx reported. The pt's great grandson is at bedside. No further complaints or concerns at this time.       Arrival mode: [facility]    PCP: Bruno Nugent MD        Past Medical History:  Past Medical History:   Diagnosis Date    Abnormal ECG 12/17/2014    AICD (automatic cardioverter/defibrillator) present 6/14/2013    CAD (coronary artery disease) 6/14/2013    Cancer     basil cell carcinoma    Cardiac arrest 6/14/2013    CHF (congestive heart failure)     CRI  (chronic renal insufficiency) 6/14/2013    Depression 6/14/2013    Diabetes mellitus     Fatigue 6/14/2013    Heart block     Hyperlipidemia     Hypertension     LBBB (left bundle branch block) 12/17/2014    Mitral regurgitation 6/14/2013    Mixed hyperlipidemia 6/14/2013    Pulmonary nodule 6/14/2013    Thyroid disease     TR (tricuspid regurgitation) 6/14/2013    Ventricular fibrillation 6/14/2013       Past Surgical History:  No past surgical history on file.      Family History:  Family History   Problem Relation Age of Onset    Heart attack Father     Hyperlipidemia Father     Hypertension Father     Anemia Neg Hx     Arrhythmia Neg Hx     Asthma Neg Hx     Clotting disorder Neg Hx     Fainting Neg Hx     Heart disease Neg Hx     Heart failure Neg Hx     Hypertension Daughter        Social History:  Social History     Tobacco Use    Smoking status: Never    Smokeless tobacco: Never   Substance and Sexual Activity    Alcohol use: Yes     Alcohol/week: 7.0 standard drinks of alcohol     Types: 7 Glasses of wine per week    Drug use: No    Sexual activity: Not on file        Review of Systems     Review of Systems   Constitutional:  Negative for fever.   HENT:  Negative for sore throat.    Respiratory:  Negative for cough and shortness of breath.    Cardiovascular:  Negative for chest pain.   Gastrointestinal:  Positive for abdominal pain (generalized), nausea and vomiting. Negative for constipation and diarrhea.   Genitourinary:  Negative for dysuria.   Musculoskeletal:  Negative for back pain.   Skin:  Negative for rash.   Neurological:  Negative for weakness.   Hematological:  Does not bruise/bleed easily.   All other systems reviewed and are negative.     Physical Exam     Initial Vitals [09/20/23 0341]   BP Pulse Resp Temp SpO2   (!) 200/148 (!) 125 18 98.5 °F (36.9 °C) 97 %      MAP       --          Physical Exam  Nursing Notes and Vital Signs Reviewed.  Constitutional: Patient is in moderate distress.  Well-developed and well-nourished.  Head: Atraumatic. Normocephalic.  Eyes: PERRL. EOM intact. Conjunctivae are not pale. No scleral icterus.  ENT: Mucous membranes are moist. Oropharynx is clear and symmetric.    Neck: Supple. Full ROM. No lymphadenopathy.  Cardiovascular: Tachycardic. Regular rhythm. No murmurs, rubs, or gallops. Distal pulses are 2+ and symmetric.  Pulmonary/Chest: No respiratory distress. Clear to auscultation bilaterally. No wheezing or rales.  Abdominal: Soft and non-distended.  Generalized tenderness.  No rebound, guarding, or rigidity. Good bowel sounds.  Genitourinary: No CVA tenderness  Musculoskeletal: Moves all extremities. No obvious deformities. No edema. No calf tenderness.  Skin: Warm and dry.  Neurological:  Alert, awake, and appropriate.  Normal speech.  No acute focal neurological deficits are appreciated.  Psychiatric: Normal affect. Good eye contact. Appropriate in content.     ED Course   Procedures  ED Vital Signs:  Vitals:    09/20/23 0341 09/20/23 0356 09/20/23 0414 09/20/23 0442   BP: (!) 200/148 (!) 181/118  (!) 211/102   Pulse: (!) 125      Resp: 18      Temp: 98.5 °F (36.9 °C)      TempSrc: Oral      SpO2: 97%      Weight:   56.9 kg (125 lb 7.1 oz)     09/20/23 0455 09/20/23 0501 09/20/23 0601 09/20/23 0800   BP:  (!) 148/76 (!) 143/72 (!) 172/80   Pulse:  83 74 87   Resp: 18 20 20 20   Temp:       TempSrc:       SpO2:  97% 97% 96%   Weight:        09/20/23 0915 09/20/23 1100 09/20/23 1115 09/20/23 1125   BP: (!) 195/95 (!) 78/48 (!) 78/53 (!) 94/56   Pulse: 102 87 (!) 125 79   Resp: 20 16 13 15   Temp:       TempSrc:       SpO2: 100% 96% 96% 95%   Weight:        09/20/23 1130   BP: 117/64   Pulse: 75   Resp: 15   Temp:    TempSrc:    SpO2: 95%   Weight:        Abnormal Lab Results:  Labs Reviewed   CBC W/ AUTO DIFFERENTIAL - Abnormal; Notable for the following components:       Result Value    WBC 14.28 (*)     RBC 6.22 (*)     Hemoglobin 18.8 (*)     Hematocrit 56.9  (*)     Gran # (ANC) 12.4 (*)     Immature Grans (Abs) 0.07 (*)     Gran % 86.4 (*)     Lymph % 7.5 (*)     All other components within normal limits   COMPREHENSIVE METABOLIC PANEL - Abnormal; Notable for the following components:    Sodium 147 (*)     Glucose 151 (*)     BUN 36 (*)     Creatinine 1.5 (*)     Calcium 11.2 (*)     Total Protein 8.8 (*)     eGFR 33 (*)     All other components within normal limits   URINALYSIS, REFLEX TO URINE CULTURE - Abnormal; Notable for the following components:    Protein, UA 3+ (*)     Glucose, UA 3+ (*)     Ketones, UA 1+ (*)     Occult Blood UA 1+ (*)     All other components within normal limits    Narrative:     In and Out Cath as needed it patient unable to void  Specimen Source->Urine   URINALYSIS MICROSCOPIC - Abnormal; Notable for the following components:    RBC, UA 11 (*)     All other components within normal limits    Narrative:     In and Out Cath as needed it patient unable to void  Specimen Source->Urine   TROPONIN I - Abnormal; Notable for the following components:    Troponin I 0.096 (*)     All other components within normal limits   B-TYPE NATRIURETIC PEPTIDE - Abnormal; Notable for the following components:     (*)     All other components within normal limits   LACTIC ACID, PLASMA - Abnormal; Notable for the following components:    Lactate (Lactic Acid) 2.9 (*)     All other components within normal limits   TROPONIN I - Abnormal; Notable for the following components:    Troponin I 0.096 (*)     All other components within normal limits   B-TYPE NATRIURETIC PEPTIDE   TROPONIN I   LIPASE   LIPASE        All Lab Results:  Results for orders placed or performed during the hospital encounter of 09/20/23   CBC auto differential   Result Value Ref Range    WBC 14.28 (H) 3.90 - 12.70 K/uL    RBC 6.22 (H) 4.00 - 5.40 M/uL    Hemoglobin 18.8 (H) 12.0 - 16.0 g/dL    Hematocrit 56.9 (H) 37.0 - 48.5 %    MCV 92 82 - 98 fL    MCH 30.2 27.0 - 31.0 pg    MCHC  33.0 32.0 - 36.0 g/dL    RDW 14.4 11.5 - 14.5 %    Platelets 285 150 - 450 K/uL    MPV 9.6 9.2 - 12.9 fL    Immature Granulocytes 0.5 0.0 - 0.5 %    Gran # (ANC) 12.4 (H) 1.8 - 7.7 K/uL    Immature Grans (Abs) 0.07 (H) 0.00 - 0.04 K/uL    Lymph # 1.1 1.0 - 4.8 K/uL    Mono # 0.7 0.3 - 1.0 K/uL    Eos # 0.0 0.0 - 0.5 K/uL    Baso # 0.08 0.00 - 0.20 K/uL    nRBC 0 0 /100 WBC    Gran % 86.4 (H) 38.0 - 73.0 %    Lymph % 7.5 (L) 18.0 - 48.0 %    Mono % 4.9 4.0 - 15.0 %    Eosinophil % 0.1 0.0 - 8.0 %    Basophil % 0.6 0.0 - 1.9 %    Differential Method Automated    Comprehensive metabolic panel   Result Value Ref Range    Sodium 147 (H) 136 - 145 mmol/L    Potassium 3.7 3.5 - 5.1 mmol/L    Chloride 107 95 - 110 mmol/L    CO2 25 23 - 29 mmol/L    Glucose 151 (H) 70 - 110 mg/dL    BUN 36 (H) 8 - 23 mg/dL    Creatinine 1.5 (H) 0.5 - 1.4 mg/dL    Calcium 11.2 (H) 8.7 - 10.5 mg/dL    Total Protein 8.8 (H) 6.0 - 8.4 g/dL    Albumin 4.7 3.5 - 5.2 g/dL    Total Bilirubin 0.8 0.1 - 1.0 mg/dL    Alkaline Phosphatase 80 55 - 135 U/L    AST 23 10 - 40 U/L    ALT 14 10 - 44 U/L    eGFR 33 (A) >60 mL/min/1.73 m^2    Anion Gap 15 8 - 16 mmol/L   Urinalysis, Reflex to Urine Culture Urine, Catheterized    Specimen: Urine   Result Value Ref Range    Specimen UA Urine, Catheterized     Color, UA Yellow Yellow, Straw, Deepika    Appearance, UA Clear Clear    pH, UA 8.0 5.0 - 8.0    Specific Gravity, UA 1.020 1.005 - 1.030    Protein, UA 3+ (A) Negative    Glucose, UA 3+ (A) Negative    Ketones, UA 1+ (A) Negative    Bilirubin (UA) Negative Negative    Occult Blood UA 1+ (A) Negative    Nitrite, UA Negative Negative    Urobilinogen, UA Negative <2.0 EU/dL    Leukocytes, UA Negative Negative   Urinalysis Microscopic   Result Value Ref Range    RBC, UA 11 (H) 0 - 4 /hpf    WBC, UA 1 0 - 5 /hpf    Bacteria None None-Occ /hpf    Yeast, UA None None    Hyaline Casts, UA 1 0-1/lpf /lpf    Microscopic Comment SEE COMMENT    Troponin I   Result Value  Ref Range    Troponin I 0.096 (H) 0.000 - 0.026 ng/mL   BNP   Result Value Ref Range     (H) 0 - 99 pg/mL   Lactic acid, plasma   Result Value Ref Range    Lactate (Lactic Acid) 2.9 (H) 0.5 - 2.2 mmol/L   Troponin I   Result Value Ref Range    Troponin I 0.096 (H) 0.000 - 0.026 ng/mL   Lipase   Result Value Ref Range    Lipase 15 4 - 60 U/L   Basic Metabolic Panel   Result Value Ref Range    Sodium 143 136 - 145 mmol/L    Potassium 3.7 3.5 - 5.1 mmol/L    Chloride 104 95 - 110 mmol/L    CO2 25 23 - 29 mmol/L    Glucose 95 70 - 110 mg/dL    BUN 42 (H) 8 - 23 mg/dL    Creatinine 1.4 0.5 - 1.4 mg/dL    Calcium 9.2 8.7 - 10.5 mg/dL    Anion Gap 14 8 - 16 mmol/L    eGFR 36 (A) >60 mL/min/1.73 m^2   Comprehensive Metabolic Panel (CMP)   Result Value Ref Range    Sodium 141 136 - 145 mmol/L    Potassium 4.1 3.5 - 5.1 mmol/L    Chloride 108 95 - 110 mmol/L    CO2 19 (L) 23 - 29 mmol/L    Glucose 90 70 - 110 mg/dL    BUN 42 (H) 8 - 23 mg/dL    Creatinine 1.3 0.5 - 1.4 mg/dL    Calcium 8.8 8.7 - 10.5 mg/dL    Total Protein 6.3 6.0 - 8.4 g/dL    Albumin 3.4 (L) 3.5 - 5.2 g/dL    Total Bilirubin 0.7 0.1 - 1.0 mg/dL    Alkaline Phosphatase 59 55 - 135 U/L    AST 25 10 - 40 U/L    ALT 10 10 - 44 U/L    eGFR 39 (A) >60 mL/min/1.73 m^2    Anion Gap 14 8 - 16 mmol/L   CBC with Automated Differential   Result Value Ref Range    WBC 10.22 3.90 - 12.70 K/uL    RBC 5.03 4.00 - 5.40 M/uL    Hemoglobin 15.1 12.0 - 16.0 g/dL    Hematocrit 49.3 (H) 37.0 - 48.5 %    MCV 98 82 - 98 fL    MCH 30.0 27.0 - 31.0 pg    MCHC 30.6 (L) 32.0 - 36.0 g/dL    RDW 14.6 (H) 11.5 - 14.5 %    Platelets 233 150 - 450 K/uL    MPV 10.2 9.2 - 12.9 fL    Immature Granulocytes 0.4 0.0 - 0.5 %    Gran # (ANC) 6.4 1.8 - 7.7 K/uL    Immature Grans (Abs) 0.04 0.00 - 0.04 K/uL    Lymph # 2.4 1.0 - 4.8 K/uL    Mono # 0.9 0.3 - 1.0 K/uL    Eos # 0.4 0.0 - 0.5 K/uL    Baso # 0.12 0.00 - 0.20 K/uL    nRBC 0 0 /100 WBC    Gran % 62.2 38.0 - 73.0 %    Lymph % 23.7  18.0 - 48.0 %    Mono % 8.9 4.0 - 15.0 %    Eosinophil % 3.6 0.0 - 8.0 %    Basophil % 1.2 0.0 - 1.9 %    Differential Method Automated          Imaging Results:  Imaging Results              CT Abdomen Pelvis  Without Contrast (Final result)  Result time 09/20/23 06:52:00      Final result by Waldemar Washington MD (09/20/23 06:52:00)                   Impression:     No acute findings identified.  3.5 cm probable complex cyst left kidney.  Consider follow-up ultrasound or CT scan with contrast.  Colonic diverticulosis.    All CT scans at [this location] are performed using dose modulation techniques as appropriate to a performed exam including the following:  Automated exposure control; adjustment of the mA and/or kV according to patient size (this includes techniques or standardized protocols for targeted exams where dose is matched to indication / reason for exam; i.e. extremities or head); use of iterative reconstruction technique.    Finalized on: 9/20/2023 6:52 AM By:  Waldemar Washington MD  [facility]# [id number]      2023-09-20 06:54:07.124    [facility]               Narrative:    EXAM: CT ABDOMEN PELVIS WITHOUT CONTRAST    CLINICAL INDICATION: Acute generalized abdominal pain    TECHNIQUE: Noncontrast CT scan performed through the abdomen and pelvis.    FINDINGS: 5 mm nodule in the right middle lobe.  An adjacent 3 mm nodule also noted.  No nodules demonstrated larger than 5 mm.  Pacemaker leads.  The liver is normal.   There is contrast in the gallbladder.  No biliary ductal dilatation.  The spleen is normal.  No pancreatic abnormality is identified.  The adrenal glands are normal.  No obstructing kidney stones or hydronephrosis.  3.5 cm complex left renal cyst.  1 cm cyst right kidney.  Aortic and branch structure atherosclerosis.  No enlarged lymph nodes or abnormal fluid collections identified.  Colonic diverticulosis.  No evidence of diverticulitis.  No evidence of bowel obstruction or evidence of acute appendicitis.  The  bladder is unremarkable.  No acute osseous abnormalities are identified.                                         X-Ray Chest AP Portable (Final result)  Result time 09/20/23 07:35:54      Final result by Mick Loo MD (09/20/23 07:35:54)                   Impression:      No acute finding in the chest.      Electronically signed by: Mick Loo  Date:    09/20/2023  Time:    07:35               Narrative:    EXAMINATION:  XR CHEST AP PORTABLE    CLINICAL HISTORY:  Chest Pain;    FINDINGS:  Comparison: August 7, 2023    Left-sided cardiac conduction device is redemonstrated.    Mediastinal silhouette is within normal limits.  The lungs are clear.  No pneumothorax or pleural effusion.  No acute osseous finding.                                       The EKG was ordered, reviewed, and independently interpreted by the ED provider.  Interpretation time: 04:33  Rate: 117 BPM  Rhythm: Sinus tachycardia with premature atrial complexes  Interpretation: Left ventricular hypertrophy with repolarization abnormality ([place] product). Anterior infarct, age undetermined. No STEMI.           The Emergency Provider reviewed the vital signs and test results, which are outlined above.     ED Discussion     6:00 AM: Dr. Garcia transfers care of patient to Dr. Swanson pending lab results.    Discussed with hospital medicine, Dr. Vital, who is agreeable to hospitalization       Medical Decision Making  89 y.o. F who has been vomiting for 2 days. Dehydrated. tachycardic. Hemoglobin 18.8. Lactic 2.9. CT abdomen pelvis WNL. Troponin elevated (lower than prior however). Treated with antiemetics and IV fluids.  Admitted to Medicine for further management    Amount and/or Complexity of Data Reviewed  External Data Reviewed: labs and notes.     Details: Past medical history list reviewed.  Medication list reviewed.  Allergy list reviewed  Labs: ordered. Decision-making details documented in ED Course.  Radiology: ordered. Decision-making  details documented in ED Course.  ECG/medicine tests: ordered and independent interpretation performed. Decision-making details documented in ED Course.    Risk  Prescription drug management.  Decision regarding hospitalization.  Risk Details: Differential diagnosis;  Gastroenteritis, Bowel obstruction, Colitis, Diverticulitis, Cholecystitis, Appendicitis, Perforated bowel, Herniation, Infectious etiology, UTI, Pyelonephritis, Inferior cardiac infarct, Biliary obstruction, kidney stone                   ED Medication(s):  Medications   dextrose 5 % and 0.45 % NaCl infusion ( Intravenous New Bag 9/20/23 1100)   sodium chloride 0.9% flush 10 mL (has no administration in time range)   naloxone 0.4 mg/mL injection 0.02 mg (has no administration in time range)   glucose chewable tablet 16 g (has no administration in time range)   glucose chewable tablet 24 g (has no administration in time range)   glucagon (human recombinant) injection 1 mg (has no administration in time range)   ondansetron injection 4 mg (has no administration in time range)   dextrose 10% bolus 125 mL 125 mL (has no administration in time range)   dextrose 10% bolus 250 mL 250 mL (has no administration in time range)   diltiaZEM injection 20 mg (20 mg Intravenous Given 9/20/23 0442)   sodium chloride 0.9% bolus 1,000 mL 1,000 mL (0 mLs Intravenous Stopped 9/20/23 0843)   ondansetron injection 4 mg (4 mg Intravenous Given 9/20/23 0455)   morphine injection 4 mg (4 mg Intravenous Given 9/20/23 0455)   cloNIDine tablet 0.2 mg (0.2 mg Oral Given 9/20/23 0926)       New Prescriptions    No medications on file               Scribe Attestation:   Scribe #1: I performed the above scribed service and the documentation accurately describes the services I performed. I attest to the accuracy of the note.     Attending:   Physician Attestation Statement for Scribe #1: I, personally performed the services described in this documentation, as  scribed by Qi Cruz, in my presence, and it is both accurate and complete.       Scribe Attestation:   Scribe #2: I performed the above scribed service and the documentation accurately describes the services I performed. I attest to the accuracy of the note.    Attending Attestation:           Physician Attestation for Scribe:    Physician Attestation Statement for Scribe #2: I, River Swanson MD, reviewed documentation, as scribed by Williams Cheng in my presence, and it is both accurate and complete. I also acknowledge and confirm the content of the note done by Scribe #1.           Clinical Impression       ICD-10-CM ICD-9-CM   1. Dehydration  E86.0 276.51   2. Chest pain  R07.9 786.50   3. MAT (acute kidney injury)  N17.9 584.9   4. Elevated troponin  R77.8 790.6   5. Nausea and vomiting, unspecified vomiting type  R11.2 787.01       Disposition:   Disposition: Placed in Observation  Condition: River Gutiérrez MD  09/21/23 9253

## 2023-09-20 NOTE — HPI
The patient is a 89 yo female with past medical history of  CAD, CHF, depression, HLD, HTN, heart block, ventricular fibrillation who presented to the ED with nausea and vomiting as well as generalized abdominal pain. She reports for the last 3 days she had decreased appetite and was only taking in a few bites. Yesterday she started vomiting. She developed severe sharp abdominal pain and came to the ED. She denies abdominal pain or tenderness at this time. She reports she was given medication since her arrival which has helped. Labs are consistent with dehydration. Hospital medicine consulted. Patient placed in observation.

## 2023-09-20 NOTE — ASSESSMENT & PLAN NOTE
-initially with SBP in 230s  -given dose of clonidine in the ED  -BP decreased significantly  -hold home antihypertensive medications  -monitor blood pressure

## 2023-09-20 NOTE — SUBJECTIVE & OBJECTIVE
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    Review of patient's allergies indicates:   Allergen Reactions    Zetia [ezetimibe]     Atorvastatin     Avelox [moxifloxacin]     Erythromycin     Lisinopril     Norvasc [amlodipine]     Pcn [penicillins]     Statins-hmg-coa reductase inhibitors     Cefepime Rash    Tylenol [acetaminophen] Rash       No current facility-administered medications on file prior to encounter.     Current Outpatient Medications on File Prior to Encounter   Medication Sig    aspirin (ECOTRIN) 325 MG EC tablet Take 325 mg by mouth once daily.    carvedilol (COREG) 6.25 MG tablet Take 1 tablet (6.25 mg total) by mouth 2 (two) times daily with meals.    cefepime in dextrose 5 % (MAXIPIME) 2 gram/50 mL PgBk Inject 50 mLs (2 g total) into the vein once daily.    cetirizine (ZYRTEC) 10 MG tablet Take 1 tablet (10 mg total) by mouth once daily.    clonazePAM (KLONOPIN) 0.5 MG tablet Take 0.5 mg by mouth every evening.    cloNIDine (CATAPRES) 0.2 MG tablet Take 1 tablet by mouth every evening.    fluoxetine (PROZAC) 20 MG tablet Take 20 mg by mouth once daily.    fluticasone propionate (FLONASE) 50 mcg/actuation nasal spray 1 spray (50 mcg total) by Each Nostril route 2 (two) times daily as needed for Rhinitis.    furosemide (LASIX) 20  MG tablet Take 1 tablet (20 mg total) by mouth once daily.    levothyroxine (SYNTHROID) 75 MCG tablet Take 25 mcg by mouth once daily.    naproxen sodium 220 mg Cap Take 1 tablet by mouth every evening. Aleve PM    omega-3 fatty acids (FISH OIL CONCENTRATE ORAL) Take 1,000 mg by mouth once daily.    [DISCONTINUED] sertraline (ZOLOFT) 25 MG tablet Take 25 mg by mouth once daily.     Family History       Problem Relation (Age of Onset)    Heart attack Father    Hyperlipidemia Father    Hypertension Father, Daughter          Tobacco Use    Smoking status: Never    Smokeless tobacco: Never   Substance and Sexual Activity    Alcohol use: Yes     Alcohol/week: 7.0 standard drinks of alcohol     Types: 7 Glasses of wine per week    Drug use: No    Sexual activity: Not on file     Review of Systems   Constitutional:  Positive for appetite change.   Gastrointestinal:  Positive for abdominal pain, nausea and vomiting.   All other systems reviewed and are negative.    Objective:     Vital Signs (Most Recent):  Temp: 98.5 °F (36.9 °C) (09/20/23 0341)  Pulse: (!) 125 (09/20/23 1115)  Resp: 13 (09/20/23 1115)  BP: (!) 78/53 (09/20/23 1115)  SpO2: 96 % (09/20/23 1115) Vital Signs (24h Range):  Temp:  [98.5 °F (36.9 °C)] 98.5 °F (36.9 °C)  Pulse:  [] 125  Resp:  [13-20] 13  SpO2:  [96 %-100 %] 96 %  BP: ()/() 78/53     Weight: 56.9 kg (125 lb 7.1 oz)  Body mass index is 23.7 kg/m².     Physical Exam  HENT:      Head: Normocephalic and atraumatic.   Cardiovascular:      Rate and Rhythm: Regular rhythm. Tachycardia present.      Heart sounds: No murmur heard.  Pulmonary:      Effort: Pulmonary effort is normal. No respiratory distress.      Breath sounds: Normal breath sounds. No wheezing.   Abdominal:      General: Bowel sounds are normal. There is no distension.      Palpations: Abdomen is soft.      Tenderness: There is no abdominal tenderness.   Musculoskeletal:         General: No swelling.   Skin:     General:  Skin is warm and dry.   Neurological:      Mental Status: She is alert and oriented to person, place, and time. Mental status is at baseline.                Significant Labs: All pertinent labs within the past 24 hours have been reviewed.  CBC:   Recent Labs   Lab 09/20/23  0411   WBC 14.28*   HGB 18.8*   HCT 56.9*        CMP:   Recent Labs   Lab 09/20/23  0411   *   K 3.7      CO2 25   *   BUN 36*   CREATININE 1.5*   CALCIUM 11.2*   PROT 8.8*   ALBUMIN 4.7   BILITOT 0.8   ALKPHOS 80   AST 23   ALT 14   ANIONGAP 15     Lactic Acid:   Recent Labs   Lab 09/20/23  0457   LACTATE 2.9*     Urine Studies:   Recent Labs   Lab 09/20/23  0423   COLORU Yellow   APPEARANCEUA Clear   PHUR 8.0   SPECGRAV 1.020   PROTEINUA 3+*   GLUCUA 3+*   KETONESU 1+*   BILIRUBINUA Negative   OCCULTUA 1+*   NITRITE Negative   UROBILINOGEN Negative   LEUKOCYTESUR Negative   RBCUA 11*   WBCUA 1   BACTERIA None   HYALINECASTS 1       Significant Imaging: I have reviewed all pertinent imaging results/findings within the past 24 hours.

## 2023-09-21 VITALS
OXYGEN SATURATION: 93 % | BODY MASS INDEX: 26.35 KG/M2 | WEIGHT: 139.56 LBS | HEART RATE: 87 BPM | HEIGHT: 61 IN | SYSTOLIC BLOOD PRESSURE: 167 MMHG | RESPIRATION RATE: 17 BRPM | DIASTOLIC BLOOD PRESSURE: 70 MMHG | TEMPERATURE: 99 F

## 2023-09-21 LAB
ALBUMIN SERPL BCP-MCNC: 3.4 G/DL (ref 3.5–5.2)
ALP SERPL-CCNC: 59 U/L (ref 55–135)
ALT SERPL W/O P-5'-P-CCNC: 10 U/L (ref 10–44)
ANION GAP SERPL CALC-SCNC: 14 MMOL/L (ref 8–16)
AST SERPL-CCNC: 25 U/L (ref 10–40)
BASOPHILS # BLD AUTO: 0.12 K/UL (ref 0–0.2)
BASOPHILS NFR BLD: 1.2 % (ref 0–1.9)
BILIRUB SERPL-MCNC: 0.7 MG/DL (ref 0.1–1)
BUN SERPL-MCNC: 42 MG/DL (ref 8–23)
CALCIUM SERPL-MCNC: 8.8 MG/DL (ref 8.7–10.5)
CHLORIDE SERPL-SCNC: 108 MMOL/L (ref 95–110)
CO2 SERPL-SCNC: 19 MMOL/L (ref 23–29)
CREAT SERPL-MCNC: 1.3 MG/DL (ref 0.5–1.4)
DIFFERENTIAL METHOD: ABNORMAL
EOSINOPHIL # BLD AUTO: 0.4 K/UL (ref 0–0.5)
EOSINOPHIL NFR BLD: 3.6 % (ref 0–8)
ERYTHROCYTE [DISTWIDTH] IN BLOOD BY AUTOMATED COUNT: 14.6 % (ref 11.5–14.5)
EST. GFR  (NO RACE VARIABLE): 39 ML/MIN/1.73 M^2
GLUCOSE SERPL-MCNC: 90 MG/DL (ref 70–110)
HCT VFR BLD AUTO: 49.3 % (ref 37–48.5)
HGB BLD-MCNC: 15.1 G/DL (ref 12–16)
IMM GRANULOCYTES # BLD AUTO: 0.04 K/UL (ref 0–0.04)
IMM GRANULOCYTES NFR BLD AUTO: 0.4 % (ref 0–0.5)
LYMPHOCYTES # BLD AUTO: 2.4 K/UL (ref 1–4.8)
LYMPHOCYTES NFR BLD: 23.7 % (ref 18–48)
MCH RBC QN AUTO: 30 PG (ref 27–31)
MCHC RBC AUTO-ENTMCNC: 30.6 G/DL (ref 32–36)
MCV RBC AUTO: 98 FL (ref 82–98)
MONOCYTES # BLD AUTO: 0.9 K/UL (ref 0.3–1)
MONOCYTES NFR BLD: 8.9 % (ref 4–15)
NEUTROPHILS # BLD AUTO: 6.4 K/UL (ref 1.8–7.7)
NEUTROPHILS NFR BLD: 62.2 % (ref 38–73)
NRBC BLD-RTO: 0 /100 WBC
PLATELET # BLD AUTO: 233 K/UL (ref 150–450)
PMV BLD AUTO: 10.2 FL (ref 9.2–12.9)
POTASSIUM SERPL-SCNC: 4.1 MMOL/L (ref 3.5–5.1)
PROT SERPL-MCNC: 6.3 G/DL (ref 6–8.4)
RBC # BLD AUTO: 5.03 M/UL (ref 4–5.4)
SODIUM SERPL-SCNC: 141 MMOL/L (ref 136–145)
WBC # BLD AUTO: 10.22 K/UL (ref 3.9–12.7)

## 2023-09-21 PROCEDURE — 36415 COLL VENOUS BLD VENIPUNCTURE: CPT | Performed by: INTERNAL MEDICINE

## 2023-09-21 PROCEDURE — 51798 US URINE CAPACITY MEASURE: CPT

## 2023-09-21 PROCEDURE — 85025 COMPLETE CBC W/AUTO DIFF WBC: CPT | Performed by: INTERNAL MEDICINE

## 2023-09-21 PROCEDURE — 80053 COMPREHEN METABOLIC PANEL: CPT | Performed by: INTERNAL MEDICINE

## 2023-09-21 PROCEDURE — G0378 HOSPITAL OBSERVATION PER HR: HCPCS

## 2023-09-21 RX ORDER — PANTOPRAZOLE SODIUM 40 MG/1
40 TABLET, DELAYED RELEASE ORAL DAILY
Qty: 30 TABLET | Refills: 0 | Status: SHIPPED | OUTPATIENT
Start: 2023-09-21 | End: 2023-10-21

## 2023-09-21 NOTE — PLAN OF CARE
Problem: Infection  Goal: Absence of Infection Signs and Symptoms  Outcome: Ongoing, Progressing     Problem: Adult Inpatient Plan of Care  Goal: Plan of Care Review  Outcome: Ongoing, Progressing  Goal: Patient-Specific Goal (Individualized)  Outcome: Ongoing, Progressing  Goal: Absence of Hospital-Acquired Illness or Injury  Outcome: Ongoing, Progressing  Goal: Optimal Comfort and Wellbeing  Outcome: Ongoing, Progressing  Goal: Readiness for Transition of Care  Outcome: Ongoing, Progressing     Problem: Skin Injury Risk Increased  Goal: Skin Health and Integrity  Outcome: Ongoing, Progressing     
[facility] - Telemetry (Hospital)  Discharge Final Note    Primary Care Provider: Bruno Nugent MD    Expected Discharge Date: 9/21/2023    Final Discharge Note (most recent)       Final Note - 09/21/23 1250          Final Note    Assessment Type Final Discharge Note     Anticipated Discharge Disposition Hospice/Home     Hospital Resources/Appts/Education Provided Post-Acute resouces added to AVS        Post-Acute Status    Post-Acute Authorization Hospice     Hospice Status Set-up Complete/Auth obtained     Discharge Delays None known at this time                   SW confirmed hospice consents signed for [facility]. Pt's daughter, Marci, states DME is being delivered to home within next 3 hrs. Family will transport patient home. AVS updated.     No CM needs for discharge.     Important Message from Medicare              Follow-up providers       Bruno Nugent MD   Specialty: Internal Medicine   Relationship: PCP - General    [facility]  [address] [place] [address]   Phone: [phone]       Next Steps: Follow up in 1 week(s)              After-discharge care                Home Medical Care       [facility]   [facility]    [address] [facility] [address] [facility] [address] [place] [address]   Phone: [phone]                               
Patient discharged with personal belongings. Discharge education and medications reviewed with patient. Hospice nurse will reach out to patient. LDA and telemetry box removed per MD order. Discharge medications delivered to beside.    Problem: Infection  Goal: Absence of Infection Signs and Symptoms  Outcome: Met     Problem: Adult Inpatient Plan of Care  Goal: Plan of Care Review  Outcome: Met  Goal: Patient-Specific Goal (Individualized)  Outcome: Met  Goal: Absence of Hospital-Acquired Illness or Injury  Outcome: Met  Goal: Optimal Comfort and Wellbeing  Outcome: Met  Goal: Readiness for Transition of Care  Outcome: Met     Problem: Skin Injury Risk Increased  Goal: Skin Health and Integrity  Outcome: Met     Problem: Fall Injury Risk  Goal: Absence of Fall and Fall-Related Injury  Outcome: Met     
Pt admitted to room 243 from ER. Updated patient on plan of care. Instructed patient to use call light for assistance, call light in reach. Hourly rounding performed. Vitals q4 hours. IVF infusing, IV CDI. Pt A/Ox4, bed alarm on, instructed to call for mobility. Education provided, questions answered/encouraged. Chart check complete. Afib controlled rate on tele box #[id number]    Problem: Adult Inpatient Plan of Care  Goal: Plan of Care Review  Outcome: Ongoing, Progressing     
Normal

## 2023-09-21 NOTE — HOSPITAL COURSE
The patient is a 89 yo female with past medical history of  CAD, CHF, depression, HLD, HTN, heart block, ventricular fibrillation who presented to the ED with nausea and vomiting as well as generalized abdominal pain. She reports for the last 3 days she had decreased appetite and was only taking in a few bites. Yesterday she started vomiting. She developed severe sharp abdominal pain and came to the ED. She denies abdominal pain or tenderness at this time. She reports she was given medication since her arrival which has helped. Labs are consistent with dehydration. Hospital medicine consulted. Patient placed in observation.    9/21  Examination of patient and bedside, patient denied acute issues overnight, stated improvement in abdominal discomfort, denied chest pain, able to tolerate p.o. diet/fluids without issues.    Hemodynamically stable.    Afebrile, no leukocytosis, hemoglobin improved, creatinine improved/trended down, responded well to the IV fluids.    CT A/P- No acute findings identified.  3.5 cm probable complex cyst left kidney.   Discussed with patient's daughter Ms. Covarrubias who is medical power of ,/surrogate decisionmaker, at bedside.  She stated that patient lives at home with great grandson, Mr. Buchanan.  Patient's daughter Ms. Covarrubias assist with managing patient's care.  Patient's daughter expressed inclination towards hospice given patient's age, her preference is not to go to any further hospital, extensive medical workup/management, accordingly she expressed to opt for hospice at home upon discharge.   discussed with patient's daughter, daughter signed consents for [facility] upon discharge.  As per daughter, patient currently at baseline.  Considering clinical and hemodynamic stability, planning to discharge patient today home with home hospice.  Patient/daughter agreed to the plan, stated okay for discharge today.  Medications to be delivered bedside.

## 2023-09-21 NOTE — CONSULTS
[facility] - Telemetry (Hospital)  Initial Discharge Assessment       Primary Care Provider: Bruno Nugent MD    Admission Diagnosis: Dehydration [E86.0]  MAT (acute kidney injury) [N17.9]  Chest pain [R07.9]    Admission Date: 9/20/2023  Expected Discharge Date: 9/21/2023    Transition of Care Barriers: None    Payor: MEDICARE / Plan: MEDICARE PART A & B / Product Type: Government /     Extended Emergency Contact Information  Primary Emergency Contact: Marci Cowan   [facility]  Home Phone: [phone]  Mobile Phone: [phone]  Relation: Daughter  Secondary Emergency Contact: christina lopez  Mobile Phone: [phone]  Relation: Grandchild    Discharge Plan A: Hospice/home, Home with family         [id number] - [place] [employer] - [address]  [address]  [place] [address]  Phone: [phone] Fax: [phone]    [id number] - [place], [place] - [address] AT [facility]  [address]  [place] [address]  Phone: [phone] Fax: [phone]      Initial Assessment (most recent)       Adult Discharge Assessment - 09/21/23 1032          Discharge Assessment    Assessment Type Discharge Planning Assessment     Confirmed/corrected address, phone number and insurance Yes     Confirmed Demographics Correct on Facesheet     Source of Information patient     Communicated IMTIAZ with patient/caregiver Date not available/Unable to determine     People in Home other relative(s)     Facility Arrived From: Home     Do you expect to return to your current living situation? Yes     Do you have help at home or someone to help you manage your care at home? Yes     Who are your caregiver(s) and their phone number(s)? Marci, clark, and Christina pugh     Prior to hospitilization cognitive status: Alert/Oriented     Current cognitive status: Alert/Oriented     Walking or Climbing Stairs ambulation difficulty, requires equipment      Equipment Currently Used at Home walker, standard     Readmission within 30 days? No     Patient currently being followed by outpatient case management? No     Do you currently have service(s) that help you manage your care at home? No     Do you take prescription medications? Yes     Do you have prescription coverage? Yes     Do you have any problems affording any of your prescribed medications? No     Who is going to help you get home at discharge? Pt's daughter, Marci     How do you get to doctors appointments? family or friend will provide     Are you on dialysis? No     Do you take coumadin? No     DME Needed Upon Discharge  none     Discharge Plan discussed with: Patient;Adult children     Transition of Care Barriers None     Discharge Plan A Hospice/home;Home with family                   RISHABH met with patient and daughter, Marci, at bedside to complete discharge assessment and discuss hospice consult. Pt lives at home with great grandson, Dewayne. Pt's daughter, Marci, assists with managing pt's care. Patient and daughter, Marci requesting hospice services for discharge. Family has no experience with hospice and does not have a preference. Pt referred to [facility] d/t Medicare.gov star rating. Informational visit arranged with [facility] rep. Plan to discharge home today once hospice set up complete.    RISHABH updated pt's whiteboard to provide CM contact and anticipated discharge plan. SW to f/u on status.

## 2023-09-21 NOTE — DISCHARGE SUMMARY
[facility] - Telemetry ([facility] Medicine  Discharge Summary      Patient Name: Marysol Yancey  MRN: [id number]  [facility]: [id number]  Patient Class: OP- Observation  Admission Date: 9/20/2023  Hospital Length of Stay: 0 days  Discharge Date and Time: 9/21/2023  Attending Physician: Lana Pavon,*   Discharging Provider: Lana Pavon MD  Primary Care Provider: Bruno Nugent MD    Primary Care Team: [facility] MEDICINE B    HPI:   The patient is a 89 yo female with past medical history of  CAD, CHF, depression, HLD, HTN, heart block, ventricular fibrillation who presented to the ED with nausea and vomiting as well as generalized abdominal pain. She reports for the last 3 days she had decreased appetite and was only taking in a few bites. Yesterday she started vomiting. She developed severe sharp abdominal pain and came to the ED. She denies abdominal pain or tenderness at this time. She reports she was given medication since her arrival which has helped. Labs are consistent with dehydration. Hospital medicine consulted. Patient placed in observation.      * No surgery found *      Hospital Course:   The patient is a 89 yo female with past medical history of  CAD, CHF, depression, HLD, HTN, heart block, ventricular fibrillation who presented to the ED with nausea and vomiting as well as generalized abdominal pain. She reports for the last 3 days she had decreased appetite and was only taking in a few bites. Yesterday she started vomiting. She developed severe sharp abdominal pain and came to the ED. She denies abdominal pain or tenderness at this time. She reports she was given medication since her arrival which has helped. Labs are consistent with dehydration. Hospital medicine consulted. Patient placed in observation.    9/21  Examination of patient and bedside, patient denied acute issues overnight, stated improvement in abdominal discomfort, denied chest pain, able to tolerate p.o.  diet/fluids without issues.    Hemodynamically stable.    Afebrile, no leukocytosis, hemoglobin improved, creatinine improved/trended down, responded well to the IV fluids.    CT A/P- No acute findings identified.  3.5 cm probable complex cyst left kidney.   Discussed with patient's daughter Ms. Covarrubias who is medical power of ,/surrogate decisionmaker, at bedside.  She stated that patient lives at home with great grandson, Mr. Buchanan.  Patient's daughter Ms. Covarrubias assist with managing patient's care.  Patient's daughter expressed inclination towards hospice given patient's age, her preference is not to go to any further hospital, extensive medical workup/management, accordingly she expressed to opt for hospice at home upon discharge.   discussed with patient's daughter, daughter signed consents for [facility] upon discharge.  As per daughter, patient currently at baseline.  Considering clinical and hemodynamic stability, planning to discharge patient today home with home hospice.  Patient/daughter agreed to the plan, stated okay for discharge today.  Medications to be delivered bedside.                   Physical Exam  HENT:      Head: Normocephalic and atraumatic.   Cardiovascular:      Rate and Rhythm: Regular rhythm.     Heart sounds: No murmur heard.  Pulmonary:      Effort: Pulmonary effort is normal. No respiratory distress.      Breath sounds: Normal breath sounds. No wheezing.   Abdominal:      General: Bowel sounds are normal. There is no distension.      Palpations: Abdomen is soft.      Tenderness: There is no abdominal tenderness.   Musculoskeletal:         General: No swelling.   Skin:     General: Skin is warm and dry.   Neurological:      Mental Status: She is alert and oriented to person, place, and time. Mental status is at baseline.           Goals of Care Treatment Preferences:  Code Status: DNR      Consults:   Consults (From admission, onward)        Status Ordering  Provider     Inpatient consult to Social Work  Once        Provider:  (Not yet assigned)    Completed KARLIE NARAYANAN          No new Assessment & Plan notes have been filed under this hospital service since the last note was generated.  Service: Hospital Medicine    Final Active Diagnoses:    Diagnosis Date Noted POA    PRINCIPAL PROBLEM:  Dehydration [E86.0] 09/20/2023 Yes    Nausea and vomiting [R11.2] 09/20/2023 Yes    Hypertension [I10] 06/14/2013 Yes     Chronic    Depression [F32.A] 06/14/2013 Yes     Chronic      Problems Resolved During this Admission:       Discharged Condition: fair    Disposition: Hospice/Home    Follow Up:   Contact information for after-discharge care     Home Medical Care     [facility] .    Service: Home Hospice  Contact information:  [address] [facility] [address] [phone]                           Patient Instructions:   No discharge procedures on file.    Significant Diagnostic Studies:   Results for orders placed or performed during the hospital encounter of 09/20/23   CBC auto differential   Result Value Ref Range    WBC 14.28 (H) 3.90 - 12.70 K/uL    RBC 6.22 (H) 4.00 - 5.40 M/uL    Hemoglobin 18.8 (H) 12.0 - 16.0 g/dL    Hematocrit 56.9 (H) 37.0 - 48.5 %    MCV 92 82 - 98 fL    MCH 30.2 27.0 - 31.0 pg    MCHC 33.0 32.0 - 36.0 g/dL    RDW 14.4 11.5 - 14.5 %    Platelets 285 150 - 450 K/uL    MPV 9.6 9.2 - 12.9 fL    Immature Granulocytes 0.5 0.0 - 0.5 %    Gran # (ANC) 12.4 (H) 1.8 - 7.7 K/uL    Immature Grans (Abs) 0.07 (H) 0.00 - 0.04 K/uL    Lymph # 1.1 1.0 - 4.8 K/uL    Mono # 0.7 0.3 - 1.0 K/uL    Eos # 0.0 0.0 - 0.5 K/uL    Baso # 0.08 0.00 - 0.20 K/uL    nRBC 0 0 /100 WBC    Gran % 86.4 (H) 38.0 - 73.0 %    Lymph % 7.5 (L) 18.0 - 48.0 %    Mono % 4.9 4.0 - 15.0 %    Eosinophil % 0.1 0.0 - 8.0 %    Basophil % 0.6 0.0 - 1.9 %    Differential Method Automated    Comprehensive metabolic panel   Result Value Ref Range    Sodium 147  (H) 136 - 145 mmol/L    Potassium 3.7 3.5 - 5.1 mmol/L    Chloride 107 95 - 110 mmol/L    CO2 25 23 - 29 mmol/L    Glucose 151 (H) 70 - 110 mg/dL    BUN 36 (H) 8 - 23 mg/dL    Creatinine 1.5 (H) 0.5 - 1.4 mg/dL    Calcium 11.2 (H) 8.7 - 10.5 mg/dL    Total Protein 8.8 (H) 6.0 - 8.4 g/dL    Albumin 4.7 3.5 - 5.2 g/dL    Total Bilirubin 0.8 0.1 - 1.0 mg/dL    Alkaline Phosphatase 80 55 - 135 U/L    AST 23 10 - 40 U/L    ALT 14 10 - 44 U/L    eGFR 33 (A) >60 mL/min/1.73 m^2    Anion Gap 15 8 - 16 mmol/L   Urinalysis, Reflex to Urine Culture Urine, Catheterized    Specimen: Urine   Result Value Ref Range    Specimen UA Urine, Catheterized     Color, UA Yellow Yellow, Straw, Deepika    Appearance, UA Clear Clear    pH, UA 8.0 5.0 - 8.0    Specific Gravity, UA 1.020 1.005 - 1.030    Protein, UA 3+ (A) Negative    Glucose, UA 3+ (A) Negative    Ketones, UA 1+ (A) Negative    Bilirubin (UA) Negative Negative    Occult Blood UA 1+ (A) Negative    Nitrite, UA Negative Negative    Urobilinogen, UA Negative <2.0 EU/dL    Leukocytes, UA Negative Negative   Urinalysis Microscopic   Result Value Ref Range    RBC, UA 11 (H) 0 - 4 /hpf    WBC, UA 1 0 - 5 /hpf    Bacteria None None-Occ /hpf    Yeast, UA None None    Hyaline Casts, UA 1 0-1/lpf /lpf    Microscopic Comment SEE COMMENT    Troponin I   Result Value Ref Range    Troponin I 0.096 (H) 0.000 - 0.026 ng/mL   BNP   Result Value Ref Range     (H) 0 - 99 pg/mL   Lactic acid, plasma   Result Value Ref Range    Lactate (Lactic Acid) 2.9 (H) 0.5 - 2.2 mmol/L   Troponin I   Result Value Ref Range    Troponin I 0.096 (H) 0.000 - 0.026 ng/mL   Lipase   Result Value Ref Range    Lipase 15 4 - 60 U/L   Basic Metabolic Panel   Result Value Ref Range    Sodium 143 136 - 145 mmol/L    Potassium 3.7 3.5 - 5.1 mmol/L    Chloride 104 95 - 110 mmol/L    CO2 25 23 - 29 mmol/L    Glucose 95 70 - 110 mg/dL    BUN 42 (H) 8 - 23 mg/dL    Creatinine 1.4 0.5 - 1.4 mg/dL    Calcium 9.2 8.7 - 10.5  mg/dL    Anion Gap 14 8 - 16 mmol/L    eGFR 36 (A) >60 mL/min/1.73 m^2   Comprehensive Metabolic Panel (CMP)   Result Value Ref Range    Sodium 141 136 - 145 mmol/L    Potassium 4.1 3.5 - 5.1 mmol/L    Chloride 108 95 - 110 mmol/L    CO2 19 (L) 23 - 29 mmol/L    Glucose 90 70 - 110 mg/dL    BUN 42 (H) 8 - 23 mg/dL    Creatinine 1.3 0.5 - 1.4 mg/dL    Calcium 8.8 8.7 - 10.5 mg/dL    Total Protein 6.3 6.0 - 8.4 g/dL    Albumin 3.4 (L) 3.5 - 5.2 g/dL    Total Bilirubin 0.7 0.1 - 1.0 mg/dL    Alkaline Phosphatase 59 55 - 135 U/L    AST 25 10 - 40 U/L    ALT 10 10 - 44 U/L    eGFR 39 (A) >60 mL/min/1.73 m^2    Anion Gap 14 8 - 16 mmol/L   CBC with Automated Differential   Result Value Ref Range    WBC 10.22 3.90 - 12.70 K/uL    RBC 5.03 4.00 - 5.40 M/uL    Hemoglobin 15.1 12.0 - 16.0 g/dL    Hematocrit 49.3 (H) 37.0 - 48.5 %    MCV 98 82 - 98 fL    MCH 30.0 27.0 - 31.0 pg    MCHC 30.6 (L) 32.0 - 36.0 g/dL    RDW 14.6 (H) 11.5 - 14.5 %    Platelets 233 150 - 450 K/uL    MPV 10.2 9.2 - 12.9 fL    Immature Granulocytes 0.4 0.0 - 0.5 %    Gran # (ANC) 6.4 1.8 - 7.7 K/uL    Immature Grans (Abs) 0.04 0.00 - 0.04 K/uL    Lymph # 2.4 1.0 - 4.8 K/uL    Mono # 0.9 0.3 - 1.0 K/uL    Eos # 0.4 0.0 - 0.5 K/uL    Baso # 0.12 0.00 - 0.20 K/uL    nRBC 0 0 /100 WBC    Gran % 62.2 38.0 - 73.0 %    Lymph % 23.7 18.0 - 48.0 %    Mono % 8.9 4.0 - 15.0 %    Eosinophil % 3.6 0.0 - 8.0 %    Basophil % 1.2 0.0 - 1.9 %    Differential Method Automated        Imaging Results          CT Abdomen Pelvis  Without Contrast (Final result)  Result time 09/20/23 06:52:00    Final result by Waldemar Washington MD (09/20/23 06:52:00)                 Impression:     No acute findings identified.  3.5 cm probable complex cyst left kidney.  Consider follow-up ultrasound or CT scan with contrast.  Colonic diverticulosis.    All CT scans at [this location] are performed using dose modulation techniques as appropriate to a performed exam including the following:   Automated exposure control; adjustment of the mA and/or kV according to patient size (this includes techniques or standardized protocols for targeted exams where dose is matched to indication / reason for exam; i.e. extremities or head); use of iterative reconstruction technique.    Finalized on: 9/20/2023 6:52 AM By:  Waldemar Washington MD  [facility]# [id number]      2023-09-20 06:54:07.124    [facility]             Narrative:    EXAM: CT ABDOMEN PELVIS WITHOUT CONTRAST    CLINICAL INDICATION: Acute generalized abdominal pain    TECHNIQUE: Noncontrast CT scan performed through the abdomen and pelvis.    FINDINGS: 5 mm nodule in the right middle lobe.  An adjacent 3 mm nodule also noted.  No nodules demonstrated larger than 5 mm.  Pacemaker leads.  The liver is normal.   There is contrast in the gallbladder.  No biliary ductal dilatation.  The spleen is normal.  No pancreatic abnormality is identified.  The adrenal glands are normal.  No obstructing kidney stones or hydronephrosis.  3.5 cm complex left renal cyst.  1 cm cyst right kidney.  Aortic and branch structure atherosclerosis.  No enlarged lymph nodes or abnormal fluid collections identified.  Colonic diverticulosis.  No evidence of diverticulitis.  No evidence of bowel obstruction or evidence of acute appendicitis.  The bladder is unremarkable.  No acute osseous abnormalities are identified.                                 X-Ray Chest AP Portable (Final result)  Result time 09/20/23 07:35:54    Final result by Mick Loo MD (09/20/23 07:35:54)                 Impression:      No acute finding in the chest.      Electronically signed by: Mick Loo  Date:    09/20/2023  Time:    07:35             Narrative:    EXAMINATION:  XR CHEST AP PORTABLE    CLINICAL HISTORY:  Chest Pain;    FINDINGS:  Comparison: August 7, 2023    Left-sided cardiac conduction device is redemonstrated.    Mediastinal silhouette is within normal limits.  The lungs are clear.  No pneumothorax or  pleural effusion.  No acute osseous finding.                                Pending Diagnostic Studies:     None         Medications:         Medication List      START taking these medications    multivitamin Tab  Take 1 tablet by mouth once daily.     pantoprazole 40 MG tablet  Commonly known as: PROTONIX  Take 1 tablet (40 mg total) by mouth once daily.        CONTINUE taking these medications    aspirin 325 MG EC tablet  Commonly known as: ECOTRIN     carvediloL 6.25 MG tablet  Commonly known as: COREG  Take 1 tablet (6.25 mg total) by mouth 2 (two) times daily with meals.     cetirizine 10 MG tablet  Commonly known as: ZYRTEC  Take 1 tablet (10 mg total) by mouth once daily.     clonazePAM 0.5 MG tablet  Commonly known as: KlonoPIN     cloNIDine 0.2 MG tablet  Commonly known as: CATAPRES     FISH OIL CONCENTRATE ORAL     FLUoxetine 20 MG tablet     fluticasone propionate 50 mcg/actuation nasal spray  Commonly known as: FLONASE  1 spray (50 mcg total) by Each Nostril route 2 (two) times daily as needed for Rhinitis.     furosemide 20 MG tablet  Commonly known as: LASIX  Take 1 tablet (20 mg total) by mouth once daily.     levothyroxine 75 MCG tablet  Commonly known as: SYNTHROID     naproxen sodium 220 mg Cap        STOP taking these medications    cefepime in dextrose 5 % 2 gram/50 mL Pgbk  Commonly known as: MAXIPIME           Where to Get Your Medications      These medications were sent to [facility] [address] [place] [facility] [address]    Hours: Mon-Fri, 8a-5:30p Phone: [phone]   · multivitamin Tab  · pantoprazole 40 MG tablet         Indwelling Lines/Drains at time of discharge:   Lines/Drains/Airways     None                 Time spent on the discharge of patient: 90 minutes         Lana Pavon MD  Department of Hospital Medicine  [facility] - Telemetry ([facility])

## 2025-05-21 ENCOUNTER — HOSPITAL ENCOUNTER (INPATIENT)
Facility: HOSPITAL | Age: OVER 89
LOS: 2 days | Discharge: HOME HEALTH | DRG: 291 | End: 2025-05-23
Attending: EMERGENCY MEDICINE | Admitting: STUDENT IN AN ORGANIZED HEALTH CARE EDUCATION/TRAINING PROGRAM
Payer: COMMERCIAL

## 2025-05-21 DIAGNOSIS — R06.02 SOB (SHORTNESS OF BREATH): ICD-10-CM

## 2025-05-21 DIAGNOSIS — I50.33 ACUTE ON CHRONIC DIASTOLIC CONGESTIVE HEART FAILURE: ICD-10-CM

## 2025-05-21 DIAGNOSIS — R09.02 HYPOXIA: ICD-10-CM

## 2025-05-21 DIAGNOSIS — I50.9 CHF (CONGESTIVE HEART FAILURE): ICD-10-CM

## 2025-05-21 DIAGNOSIS — I50.9 CONGESTIVE HEART FAILURE, UNSPECIFIED HF CHRONICITY, UNSPECIFIED HEART FAILURE TYPE: Primary | ICD-10-CM

## 2025-05-21 PROBLEM — R79.89 ELEVATED TROPONIN: Status: ACTIVE | Noted: 2025-05-21

## 2025-05-21 LAB
ABSOLUTE EOSINOPHIL (OHS): 0.5 K/UL
ABSOLUTE MONOCYTE (OHS): 0.64 K/UL (ref 0.3–1)
ABSOLUTE NEUTROPHIL COUNT (OHS): 6.04 K/UL (ref 1.8–7.7)
ALBUMIN SERPL BCP-MCNC: 3.9 G/DL (ref 3.5–5.2)
ALP SERPL-CCNC: 82 UNIT/L (ref 40–150)
ALT SERPL W/O P-5'-P-CCNC: 19 UNIT/L (ref 10–44)
ANION GAP (OHS): 14 MMOL/L (ref 8–16)
AORTIC ROOT ANNULUS: 2.8 CM
AORTIC SIZE INDEX: 1.5 CM/M2
ASCENDING AORTA: 2.6 CM
AST SERPL-CCNC: 22 UNIT/L (ref 11–45)
AV INDEX (PROSTH): 0.69
AV MEAN GRADIENT: 4 MMHG
AV PEAK GRADIENT: 8 MMHG
AV VALVE AREA BY VELOCITY RATIO: 2.2 CM²
AV VALVE AREA: 2.2 CM²
AV VELOCITY RATIO: 0.71
BASOPHILS # BLD AUTO: 0.14 K/UL
BASOPHILS NFR BLD AUTO: 1.6 %
BILIRUB SERPL-MCNC: 0.3 MG/DL (ref 0.1–1)
BILIRUB UR QL STRIP.AUTO: NEGATIVE
BNP SERPL-MCNC: 640 PG/ML (ref 0–99)
BSA FOR ECHO PROCEDURE: 1.86 M2
BUN SERPL-MCNC: 29 MG/DL (ref 10–30)
CALCIUM SERPL-MCNC: 9.9 MG/DL (ref 8.7–10.5)
CHLORIDE SERPL-SCNC: 105 MMOL/L (ref 95–110)
CK SERPL-CCNC: 62 U/L (ref 20–180)
CLARITY UR: CLEAR
CO2 SERPL-SCNC: 19 MMOL/L (ref 23–29)
COLOR UR AUTO: YELLOW
CREAT SERPL-MCNC: 1.9 MG/DL (ref 0.5–1.4)
CV ECHO LV RWT: 0.63 CM
DOP CALC AO PEAK VEL: 1.4 M/S
DOP CALC AO VTI: 25.3 CM
DOP CALC LVOT AREA: 3.1 CM2
DOP CALC LVOT DIAMETER: 2 CM
DOP CALC LVOT PEAK VEL: 1 M/S
DOP CALC LVOT STROKE VOLUME: 54.6 CM3
DOP CALC RVOT PEAK VEL: 0.93 M/S
DOP CALC RVOT VTI: 13.4 CM
DOP CALCLVOT PEAK VEL VTI: 17.4 CM
E WAVE DECELERATION TIME: 181 MSEC
E/A RATIO: 0.64
E/E' RATIO: 19 M/S
ECHO LV POSTERIOR WALL: 1.3 CM (ref 0.6–1.1)
EJECTION FRACTION: 50 %
ERYTHROCYTE [DISTWIDTH] IN BLOOD BY AUTOMATED COUNT: 13.7 % (ref 11.5–14.5)
FRACTIONAL SHORTENING: 34.1 % (ref 28–44)
GFR SERPLBLD CREATININE-BSD FMLA CKD-EPI: 25 ML/MIN/1.73/M2
GLUCOSE SERPL-MCNC: 100 MG/DL (ref 70–110)
GLUCOSE UR QL STRIP: NEGATIVE
HCT VFR BLD AUTO: 47.2 % (ref 37–48.5)
HGB BLD-MCNC: 15.1 GM/DL (ref 12–16)
HGB UR QL STRIP: NEGATIVE
HOLD SPECIMEN: NORMAL
IMM GRANULOCYTES # BLD AUTO: 0.06 K/UL (ref 0–0.04)
IMM GRANULOCYTES NFR BLD AUTO: 0.7 % (ref 0–0.5)
INTERVENTRICULAR SEPTUM: 1.5 CM (ref 0.6–1.1)
IVC DIAMETER: 1.44 CM
IVRT: 57 MSEC
KETONES UR QL STRIP: ABNORMAL
LA MAJOR: 4.7 CM
LA MINOR: 4.9 CM
LA WIDTH: 3.2 CM
LEFT ATRIUM SIZE: 4.2 CM
LEFT ATRIUM VOLUME INDEX: 31 ML/M2
LEFT ATRIUM VOLUME: 55 CM3
LEFT INTERNAL DIMENSION IN SYSTOLE: 2.7 CM (ref 2.1–4)
LEFT VENTRICLE DIASTOLIC VOLUME INDEX: 40.22 ML/M2
LEFT VENTRICLE DIASTOLIC VOLUME: 72 ML
LEFT VENTRICLE MASS INDEX: 121 G/M2
LEFT VENTRICLE SYSTOLIC VOLUME INDEX: 15.1 ML/M2
LEFT VENTRICLE SYSTOLIC VOLUME: 27 ML
LEFT VENTRICULAR INTERNAL DIMENSION IN DIASTOLE: 4.1 CM (ref 3.5–6)
LEFT VENTRICULAR MASS: 216.6 G
LEUKOCYTE ESTERASE UR QL STRIP: NEGATIVE
LV LATERAL E/E' RATIO: 16.8 M/S
LV SEPTAL E/E' RATIO: 21 M/S
LVED V (TEICH): 72.3 ML
LVES V (TEICH): 27.12 ML
LVOT MG: 2.85 MMHG
LVOT MV: 0.83 CM/S
LYMPHOCYTES # BLD AUTO: 1.31 K/UL (ref 1–4.8)
MCH RBC QN AUTO: 30.6 PG (ref 27–31)
MCHC RBC AUTO-ENTMCNC: 32 G/DL (ref 32–36)
MCV RBC AUTO: 96 FL (ref 82–98)
MV PEAK A VEL: 1.32 M/S
MV PEAK E VEL: 0.84 M/S
MV STENOSIS PRESSURE HALF TIME: 52.43 MS
MV VALVE AREA P 1/2 METHOD: 4.2 CM2
NITRITE UR QL STRIP: NEGATIVE
NUCLEATED RBC (/100WBC) (OHS): 0 /100 WBC
OHS QRS DURATION: 124 MS
OHS QTC CALCULATION: 511 MS
PH UR STRIP: 6 [PH]
PISA MRMAX VEL: 4.39 M/S
PISA TR MAX VEL: 2.4 M/S
PLATELET # BLD AUTO: 265 K/UL (ref 150–450)
PMV BLD AUTO: 9.8 FL (ref 9.2–12.9)
POTASSIUM SERPL-SCNC: 3.9 MMOL/L (ref 3.5–5.1)
PROT SERPL-MCNC: 8.1 GM/DL (ref 6–8.4)
PROT UR QL STRIP: NEGATIVE
PV MEAN GRADIENT: 3 MMHG
RA MAJOR: 3.08 CM
RA PRESSURE ESTIMATED: 3 MMHG
RA WIDTH: 1.8 CM
RBC # BLD AUTO: 4.94 M/UL (ref 4–5.4)
RELATIVE EOSINOPHIL (OHS): 5.8 %
RELATIVE LYMPHOCYTE (OHS): 15.1 % (ref 18–48)
RELATIVE MONOCYTE (OHS): 7.4 % (ref 4–15)
RELATIVE NEUTROPHIL (OHS): 69.4 % (ref 38–73)
RV TB RVSP: 5 MMHG
SODIUM SERPL-SCNC: 138 MMOL/L (ref 136–145)
SP GR UR STRIP: 1.02
STJ: 2.8 CM
TDI LATERAL: 0.05 M/S
TDI SEPTAL: 0.04 M/S
TDI: 0.05 M/S
TR MAX PG: 23 MMHG
TR MEAN GRADIENT: 25 MMHG
TRICUSPID ANNULAR PLANE SYSTOLIC EXCURSION: 1.9 CM
TROPONIN I SERPL DL<=0.01 NG/ML-MCNC: 0.08 NG/ML
TV REST PULMONARY ARTERY PRESSURE: 26 MMHG
UROBILINOGEN UR STRIP-ACNC: NEGATIVE EU/DL
WBC # BLD AUTO: 8.69 K/UL (ref 3.9–12.7)
Z-SCORE OF LEFT VENTRICULAR DIMENSION IN END DIASTOLE: -1.88
Z-SCORE OF LEFT VENTRICULAR DIMENSION IN END SYSTOLE: -0.99

## 2025-05-21 PROCEDURE — 25000003 PHARM REV CODE 250: Performed by: NURSE PRACTITIONER

## 2025-05-21 PROCEDURE — 93005 ELECTROCARDIOGRAM TRACING: CPT

## 2025-05-21 PROCEDURE — 51702 INSERT TEMP BLADDER CATH: CPT

## 2025-05-21 PROCEDURE — 21400001 HC TELEMETRY ROOM

## 2025-05-21 PROCEDURE — 96374 THER/PROPH/DIAG INJ IV PUSH: CPT

## 2025-05-21 PROCEDURE — 63600175 PHARM REV CODE 636 W HCPCS: Performed by: EMERGENCY MEDICINE

## 2025-05-21 PROCEDURE — 93010 ELECTROCARDIOGRAM REPORT: CPT | Mod: ,,, | Performed by: INTERNAL MEDICINE

## 2025-05-21 PROCEDURE — 83880 ASSAY OF NATRIURETIC PEPTIDE: CPT | Performed by: EMERGENCY MEDICINE

## 2025-05-21 PROCEDURE — 82550 ASSAY OF CK (CPK): CPT | Performed by: EMERGENCY MEDICINE

## 2025-05-21 PROCEDURE — 63600175 PHARM REV CODE 636 W HCPCS: Performed by: STUDENT IN AN ORGANIZED HEALTH CARE EDUCATION/TRAINING PROGRAM

## 2025-05-21 PROCEDURE — 85025 COMPLETE CBC W/AUTO DIFF WBC: CPT | Performed by: EMERGENCY MEDICINE

## 2025-05-21 PROCEDURE — 99222 1ST HOSP IP/OBS MODERATE 55: CPT | Mod: 25,,, | Performed by: PHYSICIAN ASSISTANT

## 2025-05-21 PROCEDURE — 80053 COMPREHEN METABOLIC PANEL: CPT | Performed by: EMERGENCY MEDICINE

## 2025-05-21 PROCEDURE — 25000003 PHARM REV CODE 250: Performed by: STUDENT IN AN ORGANIZED HEALTH CARE EDUCATION/TRAINING PROGRAM

## 2025-05-21 PROCEDURE — 84484 ASSAY OF TROPONIN QUANT: CPT | Performed by: EMERGENCY MEDICINE

## 2025-05-21 PROCEDURE — 81003 URINALYSIS AUTO W/O SCOPE: CPT | Performed by: EMERGENCY MEDICINE

## 2025-05-21 PROCEDURE — 99285 EMERGENCY DEPT VISIT HI MDM: CPT | Mod: 25

## 2025-05-21 RX ORDER — CLONIDINE HYDROCHLORIDE 0.2 MG/1
0.2 TABLET ORAL 2 TIMES DAILY
Status: DISCONTINUED | OUTPATIENT
Start: 2025-05-21 | End: 2025-05-23 | Stop reason: HOSPADM

## 2025-05-21 RX ORDER — FUROSEMIDE 10 MG/ML
40 INJECTION INTRAMUSCULAR; INTRAVENOUS
Status: COMPLETED | OUTPATIENT
Start: 2025-05-21 | End: 2025-05-21

## 2025-05-21 RX ORDER — FUROSEMIDE 10 MG/ML
40 INJECTION INTRAMUSCULAR; INTRAVENOUS DAILY
Status: DISCONTINUED | OUTPATIENT
Start: 2025-05-22 | End: 2025-05-23 | Stop reason: HOSPADM

## 2025-05-21 RX ORDER — MUPIROCIN 20 MG/G
OINTMENT TOPICAL 2 TIMES DAILY
Status: DISCONTINUED | OUTPATIENT
Start: 2025-05-21 | End: 2025-05-23 | Stop reason: HOSPADM

## 2025-05-21 RX ORDER — LEVOTHYROXINE SODIUM 25 UG/1
25 TABLET ORAL EVERY MORNING
COMMUNITY

## 2025-05-21 RX ORDER — ENOXAPARIN SODIUM 100 MG/ML
40 INJECTION SUBCUTANEOUS EVERY 24 HOURS
Status: DISCONTINUED | OUTPATIENT
Start: 2025-05-21 | End: 2025-05-21

## 2025-05-21 RX ORDER — SODIUM CHLORIDE 0.9 % (FLUSH) 0.9 %
10 SYRINGE (ML) INJECTION
Status: DISCONTINUED | OUTPATIENT
Start: 2025-05-21 | End: 2025-05-23 | Stop reason: HOSPADM

## 2025-05-21 RX ORDER — CLONIDINE HYDROCHLORIDE 0.2 MG/1
0.2 TABLET ORAL 2 TIMES DAILY
Status: DISCONTINUED | OUTPATIENT
Start: 2025-05-21 | End: 2025-05-21

## 2025-05-21 RX ORDER — ONDANSETRON HYDROCHLORIDE 2 MG/ML
4 INJECTION, SOLUTION INTRAVENOUS EVERY 6 HOURS PRN
Status: DISCONTINUED | OUTPATIENT
Start: 2025-05-21 | End: 2025-05-23 | Stop reason: HOSPADM

## 2025-05-21 RX ORDER — PROCHLORPERAZINE EDISYLATE 5 MG/ML
2.5 INJECTION INTRAMUSCULAR; INTRAVENOUS EVERY 8 HOURS PRN
Status: DISCONTINUED | OUTPATIENT
Start: 2025-05-21 | End: 2025-05-23 | Stop reason: HOSPADM

## 2025-05-21 RX ORDER — GABAPENTIN 100 MG/1
100 CAPSULE ORAL NIGHTLY PRN
Status: DISCONTINUED | OUTPATIENT
Start: 2025-05-21 | End: 2025-05-23 | Stop reason: HOSPADM

## 2025-05-21 RX ORDER — ENOXAPARIN SODIUM 100 MG/ML
30 INJECTION SUBCUTANEOUS EVERY 24 HOURS
Status: DISCONTINUED | OUTPATIENT
Start: 2025-05-21 | End: 2025-05-23 | Stop reason: HOSPADM

## 2025-05-21 RX ORDER — HYDRALAZINE HYDROCHLORIDE 20 MG/ML
5 INJECTION INTRAMUSCULAR; INTRAVENOUS EVERY 4 HOURS PRN
Status: DISCONTINUED | OUTPATIENT
Start: 2025-05-21 | End: 2025-05-23 | Stop reason: HOSPADM

## 2025-05-21 RX ORDER — AMOXICILLIN 250 MG
1 CAPSULE ORAL 2 TIMES DAILY
Status: DISCONTINUED | OUTPATIENT
Start: 2025-05-21 | End: 2025-05-23 | Stop reason: HOSPADM

## 2025-05-21 RX ORDER — TALC
6 POWDER (GRAM) TOPICAL NIGHTLY PRN
Status: DISCONTINUED | OUTPATIENT
Start: 2025-05-21 | End: 2025-05-23 | Stop reason: HOSPADM

## 2025-05-21 RX ORDER — CLONIDINE HYDROCHLORIDE 0.2 MG/1
0.2 TABLET ORAL NIGHTLY
Status: DISCONTINUED | OUTPATIENT
Start: 2025-05-21 | End: 2025-05-21

## 2025-05-21 RX ORDER — CARVEDILOL 6.25 MG/1
6.25 TABLET ORAL 2 TIMES DAILY WITH MEALS
Status: DISCONTINUED | OUTPATIENT
Start: 2025-05-21 | End: 2025-05-23 | Stop reason: HOSPADM

## 2025-05-21 RX ORDER — POLYETHYLENE GLYCOL 3350 17 G/17G
17 POWDER, FOR SOLUTION ORAL 2 TIMES DAILY PRN
Status: DISCONTINUED | OUTPATIENT
Start: 2025-05-21 | End: 2025-05-23 | Stop reason: HOSPADM

## 2025-05-21 RX ORDER — HYDRALAZINE HYDROCHLORIDE 20 MG/ML
5 INJECTION INTRAMUSCULAR; INTRAVENOUS EVERY 6 HOURS PRN
Status: DISCONTINUED | OUTPATIENT
Start: 2025-05-21 | End: 2025-05-21

## 2025-05-21 RX ADMIN — FUROSEMIDE 40 MG: 10 INJECTION, SOLUTION INTRAMUSCULAR; INTRAVENOUS at 09:05

## 2025-05-21 RX ADMIN — CLONIDINE HYDROCHLORIDE 0.2 MG: 0.2 TABLET ORAL at 08:05

## 2025-05-21 RX ADMIN — MUPIROCIN: 20 OINTMENT TOPICAL at 08:05

## 2025-05-21 RX ADMIN — SENNOSIDES AND DOCUSATE SODIUM 1 TABLET: 50; 8.6 TABLET ORAL at 11:05

## 2025-05-21 RX ADMIN — CLONIDINE HYDROCHLORIDE 0.2 MG: 0.2 TABLET ORAL at 11:05

## 2025-05-21 RX ADMIN — ENOXAPARIN SODIUM 30 MG: 30 INJECTION SUBCUTANEOUS at 03:05

## 2025-05-21 RX ADMIN — CARVEDILOL 6.25 MG: 6.25 TABLET, FILM COATED ORAL at 03:05

## 2025-05-21 RX ADMIN — SENNOSIDES AND DOCUSATE SODIUM 1 TABLET: 50; 8.6 TABLET ORAL at 08:05

## 2025-05-21 RX ADMIN — GABAPENTIN 100 MG: 100 CAPSULE ORAL at 08:05

## 2025-05-21 NOTE — CONSULTS
[facility] - Emergency Dept.  Cardiology  Consult Note    Patient Name: Marysol Yancey  MRN: [id number]  Admission Date: 5/21/2025  Hospital Length of Stay: 0 days  Code Status: DNR   Attending Provider: Bret De La Cruz DO   Consulting Provider: Grazyna Carr PA-C  Primary Care Physician: Bruno Nugent MD  Principal Problem:Acute on chronic diastolic heart failure    Patient information was obtained from patient, past medical records, and ER records.     Inpatient consult to Cardiology  Consult performed by: Grazyna Carr PA-C  Consult ordered by: Elizabeth Hollingsworth FNP        Subjective:     Chief Complaint:  SOB    HPI:   Ms. Yancey is a 89 year old female patient whose current medical conditions include CAD, cardiac arrest s/p AICD, CHF, CRI, depression, DM type II, HLD, HTN, LBBB, mitral regurgitation, thyroid disease, and CAD who presented to [facility] ED this AM due to increased SOB over the past few weeks that acutely worsened this AM. Associated symptoms included BLE edema and orthopnea. She denied any associated fever, chills, palpitations, near syncope, or syncope. Initial workup in ED revealed creatinine of 1.9, BNP of 640, troponin of 0.076. CXR showed chronic ILD as well as edema/ ? underlying PNA. Patient given IV Lasix and admitted for further evaluation and treatment. Cardiology consulted to assist with management. Patient seen and examined today, resting in bed.  Still SOB/overloaded. Denies any CP symptoms. States she tried purchasing something OTC for fluid removal but it did not help. She reports compliance with her medications. Last seen in clinic by Dr. Mares in 2022. Prior [facility] in 2013 showing LAD 30-40% 100% RCA occlusion, OM1 40-50%       Past Medical History:   Diagnosis Date    Abnormal ECG 12/17/2014    AICD (automatic cardioverter/defibrillator) present 6/14/2013    CAD (coronary artery disease) 6/14/2013    Cancer     basil cell carcinoma    Cardiac arrest 6/14/2013    CHF  (congestive heart failure)     CRI (chronic renal insufficiency) 6/14/2013    Depression 6/14/2013    Diabetes mellitus     Fatigue 6/14/2013    Heart block     Hyperlipidemia     Hypertension     LBBB (left bundle branch block) 12/17/2014    Mitral regurgitation 6/14/2013    Mixed hyperlipidemia 6/14/2013    Pulmonary nodule 6/14/2013    Thyroid disease     TR (tricuspid regurgitation) 6/14/2013    Ventricular fibrillation 6/14/2013       History reviewed. No pertinent surgical history.    Review of patient's allergies indicates:   Allergen Reactions    Zetia [ezetimibe]     Atorvastatin     Avelox [moxifloxacin]     Erythromycin     Lisinopril     Norvasc [amlodipine]     Pcn [penicillins]     Statins-hmg-coa reductase inhibitors     Cefepime Rash    Tylenol [acetaminophen] Rash       No current facility-administered medications on file prior to encounter.     Current Outpatient Medications on File Prior to Encounter   Medication Sig    carvedilol (COREG) 6.25 MG tablet Take 1 tablet (6.25 mg total) by mouth 2 (two) times daily with meals.    cloNIDine (CATAPRES) 0.2 MG tablet Take 1 tablet by mouth every evening.    levothyroxine (SYNTHROID) 25 MCG tablet Take 25 mcg by mouth every morning.    [DISCONTINUED] aspirin (ECOTRIN) 325 MG EC tablet Take 325 mg by mouth once daily.    [DISCONTINUED] cetirizine (ZYRTEC) 10 MG tablet Take 1 tablet (10 mg total) by mouth once daily.    [DISCONTINUED] clonazePAM (KLONOPIN) 0.5 MG tablet Take 0.5 mg by mouth every evening.    [DISCONTINUED] fluoxetine (PROZAC) 20 MG tablet Take 20 mg by mouth once daily.    [DISCONTINUED] fluticasone propionate (FLONASE) 50 mcg/actuation nasal spray 1 spray (50 mcg total) by Each Nostril route 2 (two) times daily as needed for Rhinitis.    [DISCONTINUED] furosemide (LASIX) 20 MG tablet Take 1 tablet (20 mg total) by mouth once daily.    [DISCONTINUED] levothyroxine (SYNTHROID) 75 MCG tablet Take 25 mcg by mouth once daily.    [DISCONTINUED]  naproxen sodium 220 mg Cap Take 1 tablet by mouth every evening. Aleve PM    [DISCONTINUED] omega-3 fatty acids (FISH OIL CONCENTRATE ORAL) Take 1,000 mg by mouth once daily.    [DISCONTINUED] pantoprazole (PROTONIX) 40 MG tablet Take 1 tablet (40 mg total) by mouth once daily.    [DISCONTINUED] sertraline (ZOLOFT) 25 MG tablet Take 25 mg by mouth once daily.     Family History       Problem Relation (Age of Onset)    Heart attack Father    Hyperlipidemia Father    Hypertension Father, Daughter          Tobacco Use    Smoking status: Never    Smokeless tobacco: Never   Substance and Sexual Activity    Alcohol use: Yes     Alcohol/week: 7.0 standard drinks of alcohol     Types: 7 Glasses of wine per week    Drug use: No    Sexual activity: Not on file     Review of Systems   Constitutional: Positive for malaise/fatigue.   HENT: Negative.     Eyes: Negative.    Cardiovascular:  Positive for dyspnea on exertion, leg swelling and orthopnea.   Respiratory:  Positive for shortness of breath.    Endocrine: Negative.    Hematologic/Lymphatic: Negative.    Skin: Negative.    Musculoskeletal: Negative.    Gastrointestinal: Negative.    Genitourinary: Negative.    Neurological:  Positive for weakness.   Psychiatric/Behavioral: Negative.     Allergic/Immunologic: Negative.      Objective:     Vital Signs (Most Recent):  Temp: 98.1 °F (36.7 °C) (05/21/25 0806)  Pulse: 110 (05/21/25 1230)  Resp: 20 (05/21/25 1230)  BP: (!) 160/93 (05/21/25 1230)  SpO2: 96 % (05/21/25 1230) Vital Signs (24h Range):  Temp:  [98.1 °F (36.7 °C)] 98.1 °F (36.7 °C)  Pulse:  [101-113] 110  Resp:  [16-35] 20  SpO2:  [92 %-99 %] 96 %  BP: (152-194)/() 160/93     Weight: 82.3 kg (181 lb 6.4 oz)  Body mass index is 35.43 kg/m².    SpO2: 96 %         Intake/Output Summary (Last 24 hours) at 5/21/2025 1255  Last data filed at 5/21/2025 1109  Gross per 24 hour   Intake --   Output 1000 ml   Net -1000 ml       Lines/Drains/Airways       Drain  Duration  "                 Urethral Catheter 05/21/25 1100 <1 day              Peripheral Intravenous Line  Duration                  Peripheral IV - Single Lumen 05/21/25 0838 20 G 1 3/4 in Anterior;Right Upper Arm <1 day                     Physical Exam  Vitals and nursing note reviewed.   Constitutional:       General: She is not in acute distress.     Appearance: She is well-developed. She is not diaphoretic.   HENT:      Head: Normocephalic and atraumatic.   Eyes:      General:         Right eye: No discharge.         Left eye: No discharge.      Pupils: Pupils are equal, round, and reactive to light.   Neck:      Thyroid: No thyromegaly.      Vascular: No JVD.      Trachea: No tracheal deviation.   Cardiovascular:      Rate and Rhythm: Regular rhythm. Tachycardia present.      Heart sounds: Normal heart sounds, S1 normal and S2 normal. No murmur heard.     Comments: AICD site well-healed  Pulmonary:      Effort: Pulmonary effort is normal.      Breath sounds: Rhonchi and rales present.   Abdominal:      General: There is no distension.   Musculoskeletal:      Cervical back: Neck supple.      Right lower leg: Edema present.      Left lower leg: Edema present.   Skin:     General: Skin is warm and dry.      Findings: No erythema.      Comments: CVI changes BLE   Neurological:      Mental Status: She is alert and oriented to person, place, and time.   Psychiatric:         Mood and Affect: Mood normal.         Behavior: Behavior normal.         Thought Content: Thought content normal.          Significant Labs: CMP   Recent Labs   Lab 05/21/25  0837      K 3.9      CO2 19*      BUN 29   CREATININE 1.9*   CALCIUM 9.9   PROT 8.1   ALBUMIN 3.9   BILITOT 0.3   ALKPHOS 82   AST 22   ALT 19   ANIONGAP 14   , CBC   Recent Labs   Lab 05/21/25  0837   WBC 8.69   HGB 15.1   HCT 47.2      , Troponin No results for input(s): "TROPONINIHS" in the last 48 hours., and All pertinent lab results from the last 24 " hours have been reviewed.        Assessment and Plan:   Patient who presents with SOB/decompensated CHF. Elevated troponin due to demand ischemia, continue to trend. Continue IV diuresis. Optimize meds as tolerated. TTE pending.    * Acute on chronic diastolic heart failure  -Presents with volume overload/SOB  -Continue IV diuresis  -Continue Coreg  -Last clinic f/u in 2022, TTE pending  -Strict I's/O's    Elevated troponin  -Initial troponin 0.076, elevation likely due to demand ischemia from elevated BP/decompensated CHF/renal failure  -No CP  -ASA if tolerated  -Continue BB  -Prior allergy to statin/BB  -TTE pending    Hypertension  -Continue BB  -Titrate meds  -BP elevated upon admission    ICD (implantable cardioverter-defibrillator) in place  -Denies AICD shocks        VTE Risk Mitigation (From admission, onward)           Ordered     enoxaparin injection 30 mg  Daily         05/21/25 1034     IP VTE HIGH RISK PATIENT  Once         05/21/25 1031     Place sequential compression device  Until discontinued         05/21/25 1031                    Thank you for your consult. I will follow-up with patient. Please contact us if you have any additional questions.    Grazyna Carr PA-C  Cardiology   [facility] - Emergency Dept.

## 2025-05-21 NOTE — SUBJECTIVE & OBJECTIVE
Past Medical History:   Diagnosis Date    Abnormal ECG 12/17/2014    AICD (automatic cardioverter/defibrillator) present 6/14/2013    CAD (coronary artery disease) 6/14/2013    Cancer     basil cell carcinoma    Cardiac arrest 6/14/2013    CHF (congestive heart failure)     CRI (chronic renal insufficiency) 6/14/2013    Depression 6/14/2013    Diabetes mellitus     Fatigue 6/14/2013    Heart block     Hyperlipidemia     Hypertension     LBBB (left bundle branch block) 12/17/2014    Mitral regurgitation 6/14/2013    Mixed hyperlipidemia 6/14/2013    Pulmonary nodule 6/14/2013    Thyroid disease     TR (tricuspid regurgitation) 6/14/2013    Ventricular fibrillation 6/14/2013       History reviewed. No pertinent surgical history.    Review of patient's allergies indicates:   Allergen Reactions    Zetia [ezetimibe]     Atorvastatin     Avelox [moxifloxacin]     Erythromycin     Lisinopril     Norvasc [amlodipine]     Pcn [penicillins]     Statins-hmg-coa reductase inhibitors     Cefepime Rash    Tylenol [acetaminophen] Rash       No current facility-administered medications on file prior to encounter.     Current Outpatient Medications on File Prior to Encounter   Medication Sig    aspirin (ECOTRIN) 325 MG EC tablet Take 325 mg by mouth once daily.    carvedilol (COREG) 6.25 MG tablet Take 1 tablet (6.25 mg total) by mouth 2 (two) times daily with meals.    cetirizine (ZYRTEC) 10 MG tablet Take 1 tablet (10 mg total) by mouth once daily.    clonazePAM (KLONOPIN) 0.5 MG tablet Take 0.5 mg by mouth every evening.    cloNIDine (CATAPRES) 0.2 MG tablet Take 1 tablet by mouth every evening.    fluoxetine (PROZAC) 20 MG tablet Take 20 mg by mouth once daily.    fluticasone propionate (FLONASE) 50 mcg/actuation nasal spray 1 spray (50 mcg total) by Each Nostril route 2 (two) times daily as needed for Rhinitis.    furosemide (LASIX) 20 MG tablet Take 1 tablet (20 mg total) by mouth once daily.    levothyroxine (SYNTHROID) 75  MCG tablet Take 25 mcg by mouth once daily.    naproxen sodium 220 mg Cap Take 1 tablet by mouth every evening. Aleve PM    omega-3 fatty acids (FISH OIL CONCENTRATE ORAL) Take 1,000 mg by mouth once daily.    pantoprazole (PROTONIX) 40 MG tablet Take 1 tablet (40 mg total) by mouth once daily.    [DISCONTINUED] sertraline (ZOLOFT) 25 MG tablet Take 25 mg by mouth once daily.     Family History       Problem Relation (Age of Onset)    Heart attack Father    Hyperlipidemia Father    Hypertension Father, Daughter          Tobacco Use    Smoking status: Never    Smokeless tobacco: Never   Substance and Sexual Activity    Alcohol use: Yes     Alcohol/week: 7.0 standard drinks of alcohol     Types: 7 Glasses of wine per week    Drug use: No    Sexual activity: Not on file     Review of Systems   Constitutional:  Positive for activity change. Negative for chills and fever.   HENT:  Negative for trouble swallowing.    Eyes:  Negative for visual disturbance.   Respiratory:  Positive for cough and shortness of breath. Negative for apnea, choking, chest tightness and wheezing.    Cardiovascular:  Positive for leg swelling.   Gastrointestinal:  Negative for abdominal pain, constipation, diarrhea, nausea and vomiting.   Genitourinary:  Negative for difficulty urinating.   Musculoskeletal:  Negative for arthralgias.   Neurological:  Positive for weakness. Negative for dizziness, tremors, seizures, syncope, facial asymmetry, speech difficulty, light-headedness, numbness and headaches.   Psychiatric/Behavioral:  Negative for agitation, behavioral problems and confusion.      Objective:     Vital Signs (Most Recent):  Temp: 98.1 °F (36.7 °C) (05/21/25 0806)  Pulse: 102 (05/21/25 0900)  Resp: 20 (05/21/25 0900)  BP: (!) 165/94 (05/21/25 0900)  SpO2: 99 % (05/21/25 0900) Vital Signs (24h Range):  Temp:  [98.1 °F (36.7 °C)] 98.1 °F (36.7 °C)  Pulse:  [101-108] 102  Resp:  [16-20] 20  SpO2:  [92 %-99 %] 99 %  BP: (152-165)/(80-96)  165/94     Weight: 82.3 kg (181 lb 6.4 oz)  Body mass index is 35.43 kg/m².     Physical Exam  Vitals reviewed.   Constitutional:       General: She is not in acute distress.  HENT:      Head: Normocephalic.   Eyes:      Extraocular Movements: Extraocular movements intact.   Cardiovascular:      Rate and Rhythm: Normal rate.   Pulmonary:      Effort: Pulmonary effort is normal. No respiratory distress.   Abdominal:      General: Bowel sounds are normal. There is no distension.      Palpations: Abdomen is soft.      Tenderness: There is no abdominal tenderness.   Genitourinary:     Comments: deferred  Musculoskeletal:      Cervical back: Neck supple.      Right lower leg: Edema present.      Left lower leg: Edema present.   Skin:     General: Skin is dry.      Comments: Dry, cracked skin noted to BLE   Neurological:      Mental Status: She is alert and oriented to person, place, and time.   Psychiatric:         Mood and Affect: Mood normal.         Behavior: Behavior normal.         Thought Content: Thought content normal.                Significant Labs: All pertinent labs within the past 24 hours have been reviewed.  CBC:   Recent Labs   Lab 05/21/25  0837   WBC 8.69   HGB 15.1   HCT 47.2        CMP:   Recent Labs   Lab 05/21/25  0837      K 3.9      CO2 19*      BUN 29   CREATININE 1.9*   CALCIUM 9.9   PROT 8.1   ALBUMIN 3.9   BILITOT 0.3   ALKPHOS 82   AST 22   ALT 19   ANIONGAP 14     Troponin:   Recent Labs   Lab 05/21/25  0837   TROPONINI 0.076*     Urine Studies:   Recent Labs   Lab 05/21/25  0858   COLORU Yellow   APPEARANCEUA Clear   PHUR 6.0   SPECGRAV 1.020   PROTEINUA Negative   GLUCUA Negative   BILIRUBINUA Negative   OCCULTUA Negative   NITRITE Negative   UROBILINOGEN Negative   LEUKOCYTESUR Negative       Significant Imaging:   Imaging Results              X-Ray Chest AP Portable (Final result)  Result time 05/21/25 09:21:24      Final result by River Baird MD (05/21/25  09:21:24)                   Impression:      As above      Electronically signed by: River Baird MD  Date:    05/21/2025  Time:    09:21               Narrative:    EXAMINATION:  XR CHEST AP PORTABLE    CLINICAL HISTORY:  sob;    FINDINGS:  Single view of the chest.  Comparison 9/20/23    Cardiac silhouette is normal.  Background of chronic interstitial lung disease.  Acute on chronic interstitial edema or pneumonia not entirely excluded.  The lungs demonstrate no evidence of consolidation.  No evidence of pleural effusion or pneumothorax.  Bones appear intact.  Moderate degenerative changes and moderate atherosclerotic disease.  Left chest wall generator and defibrillator wire noted similar to prior.

## 2025-05-21 NOTE — ASSESSMENT & PLAN NOTE
-Initial troponin 0.076, elevation likely due to demand ischemia from elevated BP/decompensated CHF/renal failure  -No CP  -ASA if tolerated  -Continue BB  -Prior allergy to statin/BB  -TTE pending

## 2025-05-21 NOTE — SUBJECTIVE & OBJECTIVE
Past Medical History:   Diagnosis Date    Abnormal ECG 12/17/2014    AICD (automatic cardioverter/defibrillator) present 6/14/2013    CAD (coronary artery disease) 6/14/2013    Cancer     basil cell carcinoma    Cardiac arrest 6/14/2013    CHF (congestive heart failure)     CRI (chronic renal insufficiency) 6/14/2013    Depression 6/14/2013    Diabetes mellitus     Fatigue 6/14/2013    Heart block     Hyperlipidemia     Hypertension     LBBB (left bundle branch block) 12/17/2014    Mitral regurgitation 6/14/2013    Mixed hyperlipidemia 6/14/2013    Pulmonary nodule 6/14/2013    Thyroid disease     TR (tricuspid regurgitation) 6/14/2013    Ventricular fibrillation 6/14/2013       History reviewed. No pertinent surgical history.    Review of patient's allergies indicates:   Allergen Reactions    Zetia [ezetimibe]     Atorvastatin     Avelox [moxifloxacin]     Erythromycin     Lisinopril     Norvasc [amlodipine]     Pcn [penicillins]     Statins-hmg-coa reductase inhibitors     Cefepime Rash    Tylenol [acetaminophen] Rash       No current facility-administered medications on file prior to encounter.     Current Outpatient Medications on File Prior to Encounter   Medication Sig    carvedilol (COREG) 6.25 MG tablet Take 1 tablet (6.25 mg total) by mouth 2 (two) times daily with meals.    cloNIDine (CATAPRES) 0.2 MG tablet Take 1 tablet by mouth every evening.    levothyroxine (SYNTHROID) 25 MCG tablet Take 25 mcg by mouth every morning.    [DISCONTINUED] aspirin (ECOTRIN) 325 MG EC tablet Take 325 mg by mouth once daily.    [DISCONTINUED] cetirizine (ZYRTEC) 10 MG tablet Take 1 tablet (10 mg total) by mouth once daily.    [DISCONTINUED] clonazePAM (KLONOPIN) 0.5 MG tablet Take 0.5 mg by mouth every evening.    [DISCONTINUED] fluoxetine (PROZAC) 20 MG tablet Take 20 mg by mouth once daily.    [DISCONTINUED] fluticasone propionate (FLONASE) 50 mcg/actuation nasal spray 1 spray (50 mcg total) by Each Nostril route 2 (two)  times daily as needed for Rhinitis.    [DISCONTINUED] furosemide (LASIX) 20 MG tablet Take 1 tablet (20 mg total) by mouth once daily.    [DISCONTINUED] levothyroxine (SYNTHROID) 75 MCG tablet Take 25 mcg by mouth once daily.    [DISCONTINUED] naproxen sodium 220 mg Cap Take 1 tablet by mouth every evening. Aleve PM    [DISCONTINUED] omega-3 fatty acids (FISH OIL CONCENTRATE ORAL) Take 1,000 mg by mouth once daily.    [DISCONTINUED] pantoprazole (PROTONIX) 40 MG tablet Take 1 tablet (40 mg total) by mouth once daily.    [DISCONTINUED] sertraline (ZOLOFT) 25 MG tablet Take 25 mg by mouth once daily.     Family History       Problem Relation (Age of Onset)    Heart attack Father    Hyperlipidemia Father    Hypertension Father, Daughter          Tobacco Use    Smoking status: Never    Smokeless tobacco: Never   Substance and Sexual Activity    Alcohol use: Yes     Alcohol/week: 7.0 standard drinks of alcohol     Types: 7 Glasses of wine per week    Drug use: No    Sexual activity: Not on file     Review of Systems   Constitutional: Positive for malaise/fatigue.   HENT: Negative.     Eyes: Negative.    Cardiovascular:  Positive for dyspnea on exertion, leg swelling and orthopnea.   Respiratory:  Positive for shortness of breath.    Endocrine: Negative.    Hematologic/Lymphatic: Negative.    Skin: Negative.    Musculoskeletal: Negative.    Gastrointestinal: Negative.    Genitourinary: Negative.    Neurological:  Positive for weakness.   Psychiatric/Behavioral: Negative.     Allergic/Immunologic: Negative.      Objective:     Vital Signs (Most Recent):  Temp: 98.1 °F (36.7 °C) (05/21/25 0806)  Pulse: 110 (05/21/25 1230)  Resp: 20 (05/21/25 1230)  BP: (!) 160/93 (05/21/25 1230)  SpO2: 96 % (05/21/25 1230) Vital Signs (24h Range):  Temp:  [98.1 °F (36.7 °C)] 98.1 °F (36.7 °C)  Pulse:  [101-113] 110  Resp:  [16-35] 20  SpO2:  [92 %-99 %] 96 %  BP: (152-194)/() 160/93     Weight: 82.3 kg (181 lb 6.4 oz)  Body mass index  is 35.43 kg/m².    SpO2: 96 %         Intake/Output Summary (Last 24 hours) at 5/21/2025 1259  Last data filed at 5/21/2025 1109  Gross per 24 hour   Intake --   Output 1000 ml   Net -1000 ml       Lines/Drains/Airways       Drain  Duration                  Urethral Catheter 05/21/25 1100 <1 day              Peripheral Intravenous Line  Duration                  Peripheral IV - Single Lumen 05/21/25 0838 20 G 1 3/4 in Anterior;Right Upper Arm <1 day                     Physical Exam  Vitals and nursing note reviewed.   Constitutional:       General: She is not in acute distress.     Appearance: She is well-developed. She is not diaphoretic.   HENT:      Head: Normocephalic and atraumatic.   Eyes:      General:         Right eye: No discharge.         Left eye: No discharge.      Pupils: Pupils are equal, round, and reactive to light.   Neck:      Thyroid: No thyromegaly.      Vascular: No JVD.      Trachea: No tracheal deviation.   Cardiovascular:      Rate and Rhythm: Regular rhythm. Tachycardia present.      Heart sounds: Normal heart sounds, S1 normal and S2 normal. No murmur heard.     Comments: AICD site well-healed  Pulmonary:      Effort: Pulmonary effort is normal.      Breath sounds: Rhonchi and rales present.   Abdominal:      General: There is no distension.   Musculoskeletal:      Cervical back: Neck supple.      Right lower leg: Edema present.      Left lower leg: Edema present.   Skin:     General: Skin is warm and dry.      Findings: No erythema.      Comments: CVI changes BLE   Neurological:      Mental Status: She is alert and oriented to person, place, and time.   Psychiatric:         Mood and Affect: Mood normal.         Behavior: Behavior normal.         Thought Content: Thought content normal.          Significant Labs: CMP   Recent Labs   Lab 05/21/25  0837      K 3.9      CO2 19*      BUN 29   CREATININE 1.9*   CALCIUM 9.9   PROT 8.1   ALBUMIN 3.9   BILITOT 0.3   ALKPHOS 82  "  AST 22   ALT 19   ANIONGAP 14   , CBC   Recent Labs   Lab 05/21/25  0837   WBC 8.69   HGB 15.1   HCT 47.2      , Troponin No results for input(s): "TROPONINIHS" in the last 48 hours., and All pertinent lab results from the last 24 hours have been reviewed.        "

## 2025-05-21 NOTE — PROGRESS NOTES
Pharmacist Renal Dose Adjustment Note    Marysol Yancey is a 89 y.o. female being treated with the medication enoxaparin.    Patient Data:    Vital Signs (Most Recent):  Temp: 98.1 °F (36.7 °C) (05/21/25 0806)  Pulse: 102 (05/21/25 0900)  Resp: 20 (05/21/25 0900)  BP: (!) 165/94 (05/21/25 0900)  SpO2: 99 % (05/21/25 0900) Vital Signs (72h Range):  Temp:  [98.1 °F (36.7 °C)]   Pulse:  [101-108]   Resp:  [16-20]   BP: (152-165)/(80-96)   SpO2:  [92 %-99 %]      Recent Labs   Lab 05/21/25  0837   CREATININE 1.9*     Serum creatinine: 1.9 mg/dL (H) 05/21/25 0837  Estimated creatinine clearance: 18.3 mL/min (A)    Enoxaparin 40 mg sc daily will be changed to enoxaparin 30 mg sc daily per pharmacy renal dosing protocol for CrCl <30 mL/min.    Pharmacist's Name: Brittney Torres

## 2025-05-21 NOTE — PLAN OF CARE
Pt resting in bed .   Pt awake and alert .   Hob elevated .   Vitals stable .   POC discussed .   IV intact   Safety Measures in place   O2 at 2 liters   Chart check complete .   Will  continue to monitor .

## 2025-05-21 NOTE — PHARMACY MED REC
"Admission Medication History     The home medication history was taken by Kong Shay.    You may go to "Admission" then "Reconcile Home Medications" tabs to review and/or act upon these items.     The home medication list has been updated by the Pharmacy department.   Please read ALL comments highlighted in yellow.   Please address this information as you see fit.    Feel free to contact us if you have any questions or require assistance.      The medications listed below were removed from the home medication list. Please reorder if appropriate:  Patient reports no longer taking the following medication(s):  ASA 325MG  KLONOPIN 0.5MG  PROZAC 20MG  FLONASE NS  LASIX 20MG  NAPROXEN 220MG  PROTONIX 40MG      Medications listed below were obtained from: Analytic software- [employer], Medical records, and Patient's pharmacy  (Not in a hospital admission)      Kong Shay  [id number]      Current Outpatient Medications on File Prior to Encounter   Medication Sig Dispense Refill Last Dose/Taking    carvedilol (COREG) 6.25 MG tablet Take 1 tablet (6.25 mg total) by mouth 2 (two) times daily with meals. 180 tablet 3     cloNIDine (CATAPRES) 0.2 MG tablet Take 1 tablet by mouth every evening.       levothyroxine (SYNTHROID) 25 MCG tablet Take 25 mcg by mouth every morning.      .          "

## 2025-05-21 NOTE — ED PROVIDER NOTES
SCRIBE #1 NOTE: IKamron, am scribing for, and in the presence of, Patricio Villalobos MD. I have scribed the entire note.       History     Chief Complaint   Patient presents with    Shortness of Breath     [facility] reports SOB since 6am this morning. Pt. Reports swelling to her abd. And lower extremities. Pt. Has been taking an OTC fluid pill. She also took 650mg of ASA prior to [facility] arrival this morning. Nitro SL x 2 given during transport.      Review of patient's allergies indicates:   Allergen Reactions    Zetia [ezetimibe]     Atorvastatin     Avelox [moxifloxacin]     Erythromycin     Lisinopril     Norvasc [amlodipine]     Pcn [penicillins]     Statins-hmg-coa reductase inhibitors     Cefepime Rash    Tylenol [acetaminophen] Rash         History of Present Illness     HPI    5/21/2025, 8:07 AM  History obtained from the patient, medical records, and [facility]      History of Present Illness: Marysol Yancey is a 89 y.o. female patient with a PMHx of an AICD present, CAD, basil cell carcinoma, CHF, T2DM, HLD, HTN, LBBB,  ventricular fibrillation, and cardiac arrest who presents to the Emergency Department for evaluation of SOB which began this morning at 6:00 AM. Pt reports she has been having intermittent SOB episodes for the last few weeks. Symptoms are moderate in severity. No mitigating or exacerbating factors reported. Associated sxs include abdominal swelling and leg swelling. Pt reports she used to take OTC fluid pills but stopped due to them having no effect. [facility] report pt O2 sat was 91% on RA so they placed her on 2L of O2 nasal cannula. Prior Tx includes 650 mg ASA prior to arrival of [facility] and 2 Nitro SL en route with [facility].  No further complaints or concerns at this time.       Arrival mode: [facility]    PCP: Bruno Nugent MD        Past Medical History:  Past Medical History:   Diagnosis Date    Abnormal ECG 12/17/2014    AICD (automatic cardioverter/defibrillator) present 6/14/2013    CAD (coronary  artery disease) 6/14/2013    Cancer     basil cell carcinoma    Cardiac arrest 6/14/2013    CHF (congestive heart failure)     CRI (chronic renal insufficiency) 6/14/2013    Depression 6/14/2013    Diabetes mellitus     Fatigue 6/14/2013    Heart block     Hyperlipidemia     Hypertension     LBBB (left bundle branch block) 12/17/2014    Mitral regurgitation 6/14/2013    Mixed hyperlipidemia 6/14/2013    Pulmonary nodule 6/14/2013    Thyroid disease     TR (tricuspid regurgitation) 6/14/2013    Ventricular fibrillation 6/14/2013       Past Surgical History:  History reviewed. No pertinent surgical history.      Family History:  Family History   Problem Relation Name Age of Onset    Heart attack Father      Hyperlipidemia Father      Hypertension Father      Anemia Neg Hx      Arrhythmia Neg Hx      Asthma Neg Hx      Clotting disorder Neg Hx      Fainting Neg Hx      Heart disease Neg Hx      Heart failure Neg Hx      Hypertension Daughter         Social History:  Social History     Tobacco Use    Smoking status: Never    Smokeless tobacco: Never   Substance and Sexual Activity    Alcohol use: Yes     Alcohol/week: 7.0 standard drinks of alcohol     Types: 7 Glasses of wine per week    Drug use: No    Sexual activity: Not on file        Review of Systems     Review of Systems   Constitutional:  Negative for fever.   HENT:  Negative for sore throat.    Respiratory:  Positive for shortness of breath.    Cardiovascular:  Positive for leg swelling (bilateral). Negative for chest pain.   Gastrointestinal:  Negative for nausea.        (+) abdominal swelling   Genitourinary:  Negative for dysuria.   Musculoskeletal:  Negative for back pain.   Skin:  Negative for rash.   Neurological:  Negative for weakness.   Hematological:  Does not bruise/bleed easily.   All other systems reviewed and are negative.       Physical Exam     Initial Vitals [05/21/25 0806]   BP Pulse Resp Temp SpO2   (!) 162/80 108 16 98.1 °F (36.7 °C) (!) 92  %      MAP       --          Physical Exam  Nursing Notes and Vital Signs Reviewed.  Constitutional: Patient is in no apparent distress. Pt is elderly and frail.  Head: Atraumatic. Normocephalic.  Eyes: PERRL. EOM intact. Conjunctivae are not pale. No scleral icterus.  ENT: Mucous membranes are moist. Oropharynx is clear and symmetric.    Neck: Supple. Full ROM. No lymphadenopathy.  Cardiovascular: Regular rate. Regular rhythm. No murmurs, rubs, or gallops. Distal pulses are 2+ and symmetric.  Pulmonary/Chest: Pt is Tachypneic Clear to auscultation bilaterally. No wheezing or rales.  Abdominal: Soft and non-distended.  There is no tenderness.  No rebound, guarding, or rigidity. Good bowel sounds.  Genitourinary: No CVA tenderness.  Musculoskeletal: Moves all extremities. No obvious deformities. 2+ pitting edema. No calf tenderness.  Skin: Warm and dry.  Neurological:  Alert, awake, and appropriate.  Normal speech.  No acute focal neurological deficits are appreciated.  Psychiatric: Normal affect. Good eye contact. Appropriate in content.     ED Course   Critical Care    Date/Time: 5/21/2025 9:38 AM    Performed by: Patricio Villalobos MD  Authorized by: Patricio Villalobos MD  Direct patient critical care time: 18 minutes  Additional history critical care time: 10 minutes  Ordering / reviewing critical care time: 8 minutes  Documentation critical care time: 7 minutes  Consulting other physicians critical care time: 7 minutes  Consult with family critical care time: 4 minutes  Other critical care time: 6 minutes  Total critical care time (exclusive of procedural time) : 60 minutes  Critical care time was exclusive of separately billable procedures and treating other patients and teaching time.  Critical care was necessary to treat or prevent imminent or life-threatening deterioration of the following conditions: hypoxia and CHF.  Critical care was time spent personally by me on the following activities: blood draw  for specimens, development of treatment plan with patient or surrogate, discussions with consultants, interpretation of cardiac output measurements, evaluation of patient's response to treatment, examination of patient, obtaining history from patient or surrogate, ordering and performing treatments and interventions, ordering and review of laboratory studies, ordering and review of radiographic studies, pulse oximetry, re-evaluation of patient's condition and review of old charts.        ED Vital Signs:  Vitals:    05/21/25 0806 05/21/25 0823 05/21/25 0826 05/21/25 0845   BP: (!) 162/80   (!) 152/96   Pulse: 108  101 104   Resp: 16   20   Temp: 98.1 °F (36.7 °C)      TempSrc: Oral      SpO2: (!) 92%   97%   Weight:  82.3 kg (181 lb 6.4 oz)     Height:  5' (1.524 m)      05/21/25 0900   BP: (!) 165/94   Pulse: 102   Resp: 20   Temp:    TempSrc:    SpO2: 99%   Weight:    Height:        Abnormal Lab Results:  Labs Reviewed   COMPREHENSIVE METABOLIC PANEL - Abnormal       Result Value    Sodium 138      Potassium 3.9      Chloride 105      CO2 19 (*)     Glucose 100      BUN 29      Creatinine 1.9 (*)     Calcium 9.9      Protein Total 8.1      Albumin 3.9      Bilirubin Total 0.3      ALP 82      AST 22      ALT 19      Anion Gap 14      eGFR 25 (*)    URINALYSIS, REFLEX TO URINE CULTURE - Abnormal    Color, UA Yellow      Appearance, UA Clear      pH, UA 6.0      Spec Grav UA 1.020      Protein, UA Negative      Glucose, UA Negative      Ketones, UA Trace (*)     Bilirubin, UA Negative      Blood, UA Negative      Nitrites, UA Negative      Urobilinogen, UA Negative      Leukocyte Esterase, UA Negative     B-TYPE NATRIURETIC PEPTIDE - Abnormal     (*)    TROPONIN I - Abnormal    Troponin-I 0.076 (*)    CBC WITH DIFFERENTIAL - Abnormal    WBC 8.69      RBC 4.94      HGB 15.1      HCT 47.2      MCV 96      MCH 30.6      MCHC 32.0      RDW 13.7      Platelet Count 265      MPV 9.8      Nucleated RBC 0      Neut %  69.4      Lymph % 15.1 (*)     Mono % 7.4      Eos % 5.8      Basophil % 1.6      Imm Grans % 0.7 (*)     Neut # 6.04      Lymph # 1.31      Mono # 0.64      Eos # 0.50      Baso # 0.14      Imm Grans # 0.06 (*)    CK - Normal    CPK 62     CBC W/ AUTO DIFFERENTIAL    Narrative:     The following orders were created for panel order CBC Auto Differential.  Procedure                               Abnormality         Status                     ---------                               -----------         ------                     CBC with Differential[[id number]]       Abnormal            Final result                 Please view results for these tests on the individual orders.   GREY TOP URINE HOLD   HEMOGLOBIN A1C        All Lab Results:  Results for orders placed or performed during the hospital encounter of 05/21/25   EKG 12-lead    Collection Time: 05/21/25  8:21 AM   Result Value Ref Range    QRS Duration 124 ms    [facility] QTC Calculation 511 ms   Comprehensive Metabolic Panel    Collection Time: 05/21/25  8:37 AM   Result Value Ref Range    Sodium 138 136 - 145 mmol/L    Potassium 3.9 3.5 - 5.1 mmol/L    Chloride 105 95 - 110 mmol/L    CO2 19 (L) 23 - 29 mmol/L    Glucose 100 70 - 110 mg/dL    BUN 29 10 - 30 mg/dL    Creatinine 1.9 (H) 0.5 - 1.4 mg/dL    Calcium 9.9 8.7 - 10.5 mg/dL    Protein Total 8.1 6.0 - 8.4 gm/dL    Albumin 3.9 3.5 - 5.2 g/dL    Bilirubin Total 0.3 0.1 - 1.0 mg/dL    ALP 82 40 - 150 unit/L    AST 22 11 - 45 unit/L    ALT 19 10 - 44 unit/L    Anion Gap 14 8 - 16 mmol/L    eGFR 25 (L) >60 mL/min/1.73/m2   BNP    Collection Time: 05/21/25  8:37 AM   Result Value Ref Range     (H) 0 - 99 pg/mL   CK    Collection Time: 05/21/25  8:37 AM   Result Value Ref Range    CPK 62 20 - 180 U/L   Troponin I    Collection Time: 05/21/25  8:37 AM   Result Value Ref Range    Troponin-I 0.076 (H) <=0.026 ng/mL   CBC with Differential    Collection Time: 05/21/25  8:37 AM   Result Value Ref Range    WBC 8.69  3.90 - 12.70 K/uL    RBC 4.94 4.00 - 5.40 M/uL    HGB 15.1 12.0 - 16.0 gm/dL    HCT 47.2 37.0 - 48.5 %    MCV 96 82 - 98 fL    MCH 30.6 27.0 - 31.0 pg    MCHC 32.0 32.0 - 36.0 g/dL    RDW 13.7 11.5 - 14.5 %    Platelet Count 265 150 - 450 K/uL    MPV 9.8 9.2 - 12.9 fL    Nucleated RBC 0 <=0 /100 WBC    Neut % 69.4 38 - 73 %    Lymph % 15.1 (L) 18 - 48 %    Mono % 7.4 4 - 15 %    Eos % 5.8 <=8 %    Basophil % 1.6 <=1.9 %    Imm Grans % 0.7 (H) 0.0 - 0.5 %    Neut # 6.04 1.8 - 7.7 K/uL    Lymph # 1.31 1 - 4.8 K/uL    Mono # 0.64 0.3 - 1 K/uL    Eos # 0.50 <=0.5 K/uL    Baso # 0.14 <=0.2 K/uL    Imm Grans # 0.06 (H) 0.00 - 0.04 K/uL   Urinalysis, Reflex to Urine Culture Urine, Clean Catch    Collection Time: 05/21/25  8:58 AM    Specimen: Urine, Catheterized   Result Value Ref Range    Color, UA Yellow Straw, Deepika, Yellow, Light-Orange    Appearance, UA Clear Clear    pH, UA 6.0 5.0 - 8.0    Spec Grav UA 1.020 1.005 - 1.030    Protein, UA Negative Negative    Glucose, UA Negative Negative    Ketones, UA Trace (A) Negative    Bilirubin, UA Negative Negative    Blood, UA Negative Negative    Nitrites, UA Negative Negative    Urobilinogen, UA Negative <2.0 EU/dL    Leukocyte Esterase, UA Negative Negative       Imaging Results:  Imaging Results              X-Ray Chest AP Portable (Final result)  Result time 05/21/25 09:21:24      Final result by River Baird MD (05/21/25 09:21:24)                   Impression:      As above      Electronically signed by: River Baird MD  Date:    05/21/2025  Time:    09:21               Narrative:    EXAMINATION:  XR CHEST AP PORTABLE    CLINICAL HISTORY:  sob;    FINDINGS:  Single view of the chest.  Comparison 9/20/23    Cardiac silhouette is normal.  Background of chronic interstitial lung disease.  Acute on chronic interstitial edema or pneumonia not entirely excluded.  The lungs demonstrate no evidence of consolidation.  No evidence of pleural effusion or pneumothorax.  Bones  appear intact.  Moderate degenerative changes and moderate atherosclerotic disease.  Left chest wall generator and defibrillator wire noted similar to prior.                                       The EKG was ordered, reviewed, and independently interpreted by the ED provider.  Interpretation time: 8:21  Rate: 109 BPM  Rhythm: Sinus tachycardia with premature atrial complexes  Interpretation: Left bundle branch block. No STEMI.         The Emergency Provider reviewed the vital signs and test results, which are outlined above.     ED Discussion     7:53 AM: Discussed pt's case with Dr. Mares (Cardiology) who looked at pt's EKG from [facility] and states it is only a left bundle branch block.     9:36 AM: Discussed case with AB Vieyra ([facility] Medicine). Dr. De La Cruz agrees with current care and management of pt and accepts admission.   Admitting Service: Hospital Medicine  Admitting Physician: Dr. De La Cruz  Admit to: Obs/Tele    9:36 AM: Re-evaluated pt. I have discussed test results, shared treatment plan, and the need for admission with patient/family/caretaker at bedside. Pt and/or family/caretaker express understanding at this time and agree with all information. All questions answered. Pt/caretaker/family member(s) have no further questions or concerns at this time. Pt is ready for admit.       Medical Decision Making  DDx: hypoxia, sob.    Amount and/or Complexity of Data Reviewed  Labs: ordered. Decision-making details documented in ED Course.  Radiology: ordered. Decision-making details documented in ED Course.  ECG/medicine tests: ordered and independent interpretation performed. Decision-making details documented in ED Course.    Risk  Prescription drug management.    Critical Care  Total time providing critical care: 60 minutes                ED Medication(s):  Medications   sodium chloride 0.9% flush 10 mL (has no administration in time range)   furosemide injection 40 mg (has no administration in time  range)   ondansetron injection 4 mg (has no administration in time range)   senna-docusate 8.6-50 mg per tablet 1 tablet (has no administration in time range)   enoxaparin injection 30 mg (has no administration in time range)   furosemide injection 40 mg (40 mg Intravenous Given 5/21/25 1084)       New Prescriptions    No medications on file               Scribe Attestation:   Scribe #1: I performed the above scribed service and the documentation accurately describes the services I performed. I attest to the accuracy of the note.     Attending:   Physician Attestation Statement for Scribe #1: I, Patricio Villalobos MD, personally performed the services described in this documentation, as scribed by Kamron Mascorro, in my presence, and it is both accurate and complete.           Clinical Impression       ICD-10-CM ICD-9-CM   1. Congestive heart failure, unspecified HF chronicity, unspecified heart failure type  I50.9 428.0   2. SOB (shortness of breath)  R06.02 786.05   3. Hypoxia  R09.02 799.02   4. CHF (congestive heart failure)  I50.9 428.0       Disposition:   Disposition: Placed in Observation  Condition: Stable        Patricio Villalobos MD  05/21/25 1037

## 2025-05-21 NOTE — Clinical Note
Diagnosis: Congestive heart failure, unspecified HF chronicity, unspecified heart failure type [7462512]   Future Attending Provider: EMILIANO PERLA [[id number]]

## 2025-05-21 NOTE — ASSESSMENT & PLAN NOTE
Patient's blood pressure range in the last 24 hours was: BP  Min: 152/96  Max: 194/100.The patient's inpatient anti-hypertensive regimen is listed below:  Current Antihypertensives  furosemide injection 40 mg, Daily, Intravenous  hydrALAZINE injection 5 mg, Every 4 hours PRN, Intravenous  carvediloL tablet 6.25 mg, 2 times daily with meals, Oral  cloNIDine tablet 0.2 mg, Nightly, Oral    Plan  - BP is uncontrolled, will adjust as follows: restart carvedilol 6.25 mg po bid. Patient has clonidine 0.2 mg nightly written but it appears to be prescribed as clonidine 0.2 mg po bid. Will confirm with patient how she takes  - Hydralazine PRN

## 2025-05-21 NOTE — CONSULTS
[facility] - Telemetry ([facility])  Wound Care    Patient Name:  Marysol Yancey   MRN:  [id number]  Date: 5/21/2025  Diagnosis: Acute on chronic diastolic heart failure    History:     Past Medical History:   Diagnosis Date    Abnormal ECG 12/17/2014    AICD (automatic cardioverter/defibrillator) present 6/14/2013    CAD (coronary artery disease) 6/14/2013    Cancer     basil cell carcinoma    Cardiac arrest 6/14/2013    CHF (congestive heart failure)     CRI (chronic renal insufficiency) 6/14/2013    Depression 6/14/2013    Diabetes mellitus     Fatigue 6/14/2013    Heart block     Hyperlipidemia     Hypertension     LBBB (left bundle branch block) 12/17/2014    Mitral regurgitation 6/14/2013    Mixed hyperlipidemia 6/14/2013    Pulmonary nodule 6/14/2013    Thyroid disease     TR (tricuspid regurgitation) 6/14/2013    Ventricular fibrillation 6/14/2013       Social History[1]    Precautions:     Allergies as of 05/21/2025 - Reviewed 05/21/2025   Allergen Reaction Noted    Zetia [ezetimibe]  12/17/2014    Atorvastatin  06/14/2013    Avelox [moxifloxacin]  06/14/2013    Erythromycin  06/14/2013    Lisinopril  06/14/2013    Norvasc [amlodipine]  06/14/2013    Pcn [penicillins]  06/14/2013    Statins-hmg-coa reductase inhibitors  06/14/2013    Cefepime Rash 09/07/2022    Tylenol [acetaminophen] Rash 08/07/2023       [facility] Assessment Details/Treatment     Consulted on this 89 y/o female patient for wounds to BLE. Patient was admitted 5/21/25 for acute on chronic diastolic heart failure. PMH significant for Abnormal ECG, AICD (automatic cardioverter/defibrillator) present, CAD (coronary artery disease), Cancer, Cardiac arrest, CHF (congestive heart failure), CRI (chronic renal insufficiency), Depression, Diabetes mellitus, Fatigue, Heart block, Hyperlipidemia, Hypertension, LBBB (left bundle branch block), Mitral regurgitation, Mixed hyperlipidemia, Pulmonary nodule, Thyroid disease, TR (tricuspid regurgitation), and Ventricular  fibrillation.     Patient resting in bed, awake and alert. Wound care nurse gave introduction and reason for visit. Patient agreeable to assessment and treatment, states her legs are horrible she has always had dry skin but nothing like this.     Gently lifted blankets and removed socks.   Noted moderate edema to BLE and dry flaky skin. Noted scattered areas of ulcerations to the Left anterior lower leg, mix of moist pink/red wound beds and some maroon dry crusted scabbing. Largest of the wounds is noted to have small trickling of serous drainage.   Right lateral lower leg also noted to have scattered area of ulcerations with one area with a small trickling of serous drainage. However intact skin surrounding this area is noted to have several small intact fluid filled blisters. Suspect these areas may become open as well.   Right dorsal foot noted to have some scattered areas of cracking in the skin causing partial thickness tissue loss. Wound beds are moist pink/red with scant amount of bleeding noted.   Believe all these wounds are related to excess swelling to the BLE. Cleansed all areas with vashe. Patted dry. Applied moisturizer to all intact areas of BLE. Applied strips of Aquacel extra over the right foot and two weeping areas to R and L leg. Padded with ABD pads and gauze then gently wrapped with kerlix dressing. Recommend changing daily and PRN.     Wound care orders placed. Plan to F/U early this week to reassess.   Recommend elevating BLE as much as possible as well as heel offloading.      05/21/25 1508   WOCN Assessment   WOCN Total Time (mins) 45   Visit Date 05/21/25   Visit Time 1508   Consult Type New   WOCN Speciality Wound   Wound other  (ulcerations due to swelling)   Intervention assessed;changed;applied;chart review;orders   Teaching on-going        Wound 05/21/25 1508 Ulceration Right lateral;lower Leg   Date First Assessed/Time First Assessed: 05/21/25 1508   Present on Original Admission:  Yes  Primary Wound Type: Ulceration  Side: Right  Orientation: lateral;lower  Location: Leg   Wound Image    Dressing Appearance Open to air   Drainage Amount Small   Drainage Characteristics/Odor Serous   Appearance Red;Maroon;Moist;Blistered   Tissue loss description Full thickness   Periwound Area Intact;Blistered;Dry   Care Cleansed with:;Antimicrobial agent;Applied:;Moisturizing agent   Dressing Applied;Hydrofiber;Absorptive Pad;Rolled gauze   Periwound Care Moisturizer applied   Dressing Change Due 05/22/25        Wound 05/21/25 1508 Ulceration Left anterior;lower Leg   Date First Assessed/Time First Assessed: 05/21/25 1508   Present on Original Admission: Yes  Primary Wound Type: Ulceration  Side: Left  Orientation: anterior;lower  Location: Leg   Wound Image    Dressing Appearance Open to air   Drainage Amount Small   Drainage Characteristics/Odor Serous   Appearance Red;Maroon;Moist   Periwound Area Intact;Dry   Care Cleansed with:;Antimicrobial agent;Applied:;Moisturizing agent   Dressing Applied;Hydrofiber;Absorptive Pad;Rolled gauze   Periwound Care Moisturizer applied   Dressing Change Due 05/22/25        Wound 05/21/25 1508 Ulceration Right dorsal Foot   Date First Assessed/Time First Assessed: 05/21/25 1508   Present on Original Admission: Yes  Primary Wound Type: Ulceration  Side: Right  Orientation: dorsal  Location: Foot   Wound Image    Dressing Appearance Open to air   Drainage Amount Scant   Drainage Characteristics/Odor Sanguineous   Appearance Pink;Red;Moist;Bleeding   Tissue loss description Partial thickness   Periwound Area Intact;Dry   Care Cleansed with:;Antimicrobial agent;Applied:;Moisturizing agent   Dressing Applied;Hydrofiber;Gauze;Rolled gauze   Periwound Care Moisturizer applied   Dressing Change Due 05/22/25 05/21/2025         [1]   Social History  Socioeconomic History    Marital status: Single   Tobacco Use    Smoking status: Never    Smokeless tobacco: Never   Substance  and Sexual Activity    Alcohol use: Yes     Alcohol/week: 7.0 standard drinks of alcohol     Types: 7 Glasses of wine per week    Drug use: No     Social Drivers of Health     Financial Resource Strain: High Risk (5/4/2024)    [facility]    Overall Financial Resource Strain (CARDIA)     Difficulty of Paying Living Expenses: Hard   Food Insecurity: No Food Insecurity (5/4/2024)    [facility]    Hunger Vital Sign     Worried About Running Out of Food in the Last Year: Never true     Ran Out of Food in the Last Year: Never true   Transportation Needs: No Transportation Needs (5/4/2024)    [facility]    PRAPARE - Transportation     Lack of Transportation (Medical): No     Lack of Transportation (Non-Medical): No   Physical Activity: Inactive (5/4/2024)    [facility]    Exercise Vital Sign     Days of Exercise per Week: 0 days     Minutes of Exercise per Session: 30 min   Stress: Stress Concern Present (5/4/2024)    Received from [facility] and Its [facility]idi[facility] and Affiliates    Citizen of [place] of Occupational Health - Occupational Stress Questionnaire     Feeling of Stress : Rather much   Housing Stability: Low Risk  (5/4/2024)    Received from [facility] and Its Subsidiaries and Affiliates    Housing Stability Vital Sign     Unable to Pay for Housing in the Last Year: No     Number of Places Lived in the Last Year: 1     Unstable Housing in the Last Year: No

## 2025-05-21 NOTE — HPI
"Marysol Yancey is a 89 year old female who  has a past medical history of Abnormal ECG, AICD (automatic cardioverter/defibrillator) present, CAD (coronary artery disease), Cancer, Cardiac arrest, CHF (congestive heart failure), CRI (chronic renal insufficiency), Depression, Diabetes mellitus, Fatigue, Heart block, Hyperlipidemia, Hypertension, LBBB (left bundle branch block), Mitral regurgitation, Mixed hyperlipidemia, Pulmonary nodule, Thyroid disease, TR (tricuspid regurgitation), and Ventricular fibrillation who presented to the Emergency Department for evaluation of SOB. Associated symptoms include: BLE swellling. Onset "weeks ago" but progressively became worse. She reports she is now SOB with exertion and at rest. Hx of CHF. Last 2D ECHO in 02/2022 showed EF 55% with indeterminate left ventricular diastolic function. She reportedly only takes 3 medications (clonidine, levothyroxine, and PRN med), furosemide is not one. She has been sleeping in her recliner due to current symptoms.     In ED WBC count 8.69K, Hgb/Hct 15.1/47.2, creatinine 1.9 (1.3 in 09/2023), , troponin 0.076. UA negative for infectious process. CXR showed background of chronic interstitial lung disease. Acute on chronic interstitial edema or pneumonia not entirely excluded. The lungs demonstrate no evidence of consolidation. No evidence of pleural effusion or pneumothorax. Hypoxic on scene, [facility] reported O2 sat of 91% on RA so they placed her on 2L of O2 nasal cannula. Prior Tx 2 Nitro SL en route with [facility]. She received in the ED lasix 40 mg IV. Following a comprehensive evaluation of the patient's clinical presentation, vital signs, laboratory results, and risk factors, myself with the attending made  the active decision to admit the patient for further monitoring, diagnostic work-up, and initiation of appropriate treatment, given the potential for clinical deterioration if managed in an outpatient setting. Pt admitted to  under " observation status for acute on chronic diastolic heart failure.

## 2025-05-21 NOTE — H&P
"  [facility]  [facility]  History & Physical    Patient Name: Marysol Yancey  MRN: [id number]  Patient Class: OP- Observation  Admission Date: 5/21/2025  Attending Physician: Bret De La Cruz DO   Primary Care Provider: Bruno Nugent MD         Patient information was obtained from patient and ER records.     Subjective:     Principal Problem:Acute on chronic diastolic heart failure    Chief Complaint:   Chief Complaint   Patient presents with    Shortness of Breath     [facility] reports SOB since 6am this morning. Pt. Reports swelling to her abd. And lower extremities. Pt. Has been taking an OTC fluid pill. She also took 650mg of ASA prior to [facility] arrival this morning. Nitro SL x 2 given during transport.         HPI: Marysol Yancey is a 89 year old female who  has a past medical history of Abnormal ECG, AICD (automatic cardioverter/defibrillator) present, CAD (coronary artery disease), Cancer, Cardiac arrest, CHF (congestive heart failure), CRI (chronic renal insufficiency), Depression, Diabetes mellitus, Fatigue, Heart block, Hyperlipidemia, Hypertension, LBBB (left bundle branch block), Mitral regurgitation, Mixed hyperlipidemia, Pulmonary nodule, Thyroid disease, TR (tricuspid regurgitation), and Ventricular fibrillation who presented to the Emergency Department for evaluation of SOB. Associated symptoms include: BLE swellling. Onset "weeks ago" but progressively became worse. She reports she is now SOB with exertion and at rest. Hx of CHF. Last 2D ECHO in 02/2022 showed EF 55% with indeterminate left ventricular diastolic function. She reportedly only takes 3 medications (clonidine, levothyroxine, and PRN med), furosemide is not one. She has been sleeping in her recliner due to current symptoms.     In ED WBC count 8.69K, Hgb/Hct 15.1/47.2, creatinine 1.9 (1.3 in 09/2023), , troponin 0.076. UA negative for infectious process. CXR showed background of chronic interstitial lung disease. " Acute on chronic interstitial edema or pneumonia not entirely excluded. The lungs demonstrate no evidence of consolidation. No evidence of pleural effusion or pneumothorax. Hypoxic on scene, [facility] reported O2 sat of 91% on RA so they placed her on 2L of O2 nasal cannula. Prior Tx 2 Nitro SL en route with [facility]. She received in the ED lasix 40 mg IV. Following a comprehensive evaluation of the patient's clinical presentation, vital signs, laboratory results, and risk factors, myself with the attending made  the active decision to admit the patient for further monitoring, diagnostic work-up, and initiation of appropriate treatment, given the potential for clinical deterioration if managed in an outpatient setting. Pt admitted to  under observation status for acute on chronic diastolic heart failure.     Past Medical History:   Diagnosis Date    Abnormal ECG 12/17/2014    AICD (automatic cardioverter/defibrillator) present 6/14/2013    CAD (coronary artery disease) 6/14/2013    Cancer     basil cell carcinoma    Cardiac arrest 6/14/2013    CHF (congestive heart failure)     CRI (chronic renal insufficiency) 6/14/2013    Depression 6/14/2013    Diabetes mellitus     Fatigue 6/14/2013    Heart block     Hyperlipidemia     Hypertension     LBBB (left bundle branch block) 12/17/2014    Mitral regurgitation 6/14/2013    Mixed hyperlipidemia 6/14/2013    Pulmonary nodule 6/14/2013    Thyroid disease     TR (tricuspid regurgitation) 6/14/2013    Ventricular fibrillation 6/14/2013       History reviewed. No pertinent surgical history.    Review of patient's allergies indicates:   Allergen Reactions    Zetia [ezetimibe]     Atorvastatin     Avelox [moxifloxacin]     Erythromycin     Lisinopril     Norvasc [amlodipine]     Pcn [penicillins]     Statins-hmg-coa reductase inhibitors     Cefepime Rash    Tylenol [acetaminophen] Rash       No current facility-administered medications on file prior to encounter.     Current  Outpatient Medications on File Prior to Encounter   Medication Sig    aspirin (ECOTRIN) 325 MG EC tablet Take 325 mg by mouth once daily.    carvedilol (COREG) 6.25 MG tablet Take 1 tablet (6.25 mg total) by mouth 2 (two) times daily with meals.    cetirizine (ZYRTEC) 10 MG tablet Take 1 tablet (10 mg total) by mouth once daily.    clonazePAM (KLONOPIN) 0.5 MG tablet Take 0.5 mg by mouth every evening.    cloNIDine (CATAPRES) 0.2 MG tablet Take 1 tablet by mouth every evening.    fluoxetine (PROZAC) 20 MG tablet Take 20 mg by mouth once daily.    fluticasone propionate (FLONASE) 50 mcg/actuation nasal spray 1 spray (50 mcg total) by Each Nostril route 2 (two) times daily as needed for Rhinitis.    furosemide (LASIX) 20 MG tablet Take 1 tablet (20 mg total) by mouth once daily.    levothyroxine (SYNTHROID) 75 MCG tablet Take 25 mcg by mouth once daily.    naproxen sodium 220 mg Cap Take 1 tablet by mouth every evening. Aleve PM    omega-3 fatty acids (FISH OIL CONCENTRATE ORAL) Take 1,000 mg by mouth once daily.    pantoprazole (PROTONIX) 40 MG tablet Take 1 tablet (40 mg total) by mouth once daily.    [DISCONTINUED] sertraline (ZOLOFT) 25 MG tablet Take 25 mg by mouth once daily.     Family History       Problem Relation (Age of Onset)    Heart attack Father    Hyperlipidemia Father    Hypertension Father, Daughter          Tobacco Use    Smoking status: Never    Smokeless tobacco: Never   Substance and Sexual Activity    Alcohol use: Yes     Alcohol/week: 7.0 standard drinks of alcohol     Types: 7 Glasses of wine per week    Drug use: No    Sexual activity: Not on file     Review of Systems   Constitutional:  Positive for activity change. Negative for chills and fever.   HENT:  Negative for trouble swallowing.    Eyes:  Negative for visual disturbance.   Respiratory:  Positive for cough and shortness of breath. Negative for apnea, choking, chest tightness and wheezing.    Cardiovascular:  Positive for leg  swelling.   Gastrointestinal:  Negative for abdominal pain, constipation, diarrhea, nausea and vomiting.   Genitourinary:  Negative for difficulty urinating.   Musculoskeletal:  Negative for arthralgias.   Neurological:  Positive for weakness. Negative for dizziness, tremors, seizures, syncope, facial asymmetry, speech difficulty, light-headedness, numbness and headaches.   Psychiatric/Behavioral:  Negative for agitation, behavioral problems and confusion.      Objective:     Vital Signs (Most Recent):  Temp: 98.1 °F (36.7 °C) (05/21/25 0806)  Pulse: 102 (05/21/25 0900)  Resp: 20 (05/21/25 0900)  BP: (!) 165/94 (05/21/25 0900)  SpO2: 99 % (05/21/25 0900) Vital Signs (24h Range):  Temp:  [98.1 °F (36.7 °C)] 98.1 °F (36.7 °C)  Pulse:  [101-108] 102  Resp:  [16-20] 20  SpO2:  [92 %-99 %] 99 %  BP: (152-165)/(80-96) 165/94     Weight: 82.3 kg (181 lb 6.4 oz)  Body mass index is 35.43 kg/m².     Physical Exam  Vitals reviewed.   Constitutional:       General: She is not in acute distress.  HENT:      Head: Normocephalic.   Eyes:      Extraocular Movements: Extraocular movements intact.   Cardiovascular:      Rate and Rhythm: Normal rate.   Pulmonary:      Effort: Pulmonary effort is normal. No respiratory distress.   Abdominal:      General: Bowel sounds are normal. There is no distension.      Palpations: Abdomen is soft.      Tenderness: There is no abdominal tenderness.   Genitourinary:     Comments: deferred  Musculoskeletal:      Cervical back: Neck supple.      Right lower leg: Edema present.      Left lower leg: Edema present.   Skin:     General: Skin is dry.      Comments: Dry, cracked skin noted to BLE   Neurological:      Mental Status: She is alert and oriented to person, place, and time.   Psychiatric:         Mood and Affect: Mood normal.         Behavior: Behavior normal.         Thought Content: Thought content normal.                Significant Labs: All pertinent labs within the past 24 hours have been  reviewed.  CBC:   Recent Labs   Lab 05/21/25  0837   WBC 8.69   HGB 15.1   HCT 47.2        CMP:   Recent Labs   Lab 05/21/25  0837      K 3.9      CO2 19*      BUN 29   CREATININE 1.9*   CALCIUM 9.9   PROT 8.1   ALBUMIN 3.9   BILITOT 0.3   ALKPHOS 82   AST 22   ALT 19   ANIONGAP 14     Troponin:   Recent Labs   Lab 05/21/25  0837   TROPONINI 0.076*     Urine Studies:   Recent Labs   Lab 05/21/25  0858   COLORU Yellow   APPEARANCEUA Clear   PHUR 6.0   SPECGRAV 1.020   PROTEINUA Negative   GLUCUA Negative   BILIRUBINUA Negative   OCCULTUA Negative   NITRITE Negative   UROBILINOGEN Negative   LEUKOCYTESUR Negative       Significant Imaging:   Imaging Results              X-Ray Chest AP Portable (Final result)  Result time 05/21/25 09:21:24      Final result by River Baird MD (05/21/25 09:21:24)                   Impression:      As above      Electronically signed by: River Baird MD  Date:    05/21/2025  Time:    09:21               Narrative:    EXAMINATION:  XR CHEST AP PORTABLE    CLINICAL HISTORY:  sob;    FINDINGS:  Single view of the chest.  Comparison 9/20/23    Cardiac silhouette is normal.  Background of chronic interstitial lung disease.  Acute on chronic interstitial edema or pneumonia not entirely excluded.  The lungs demonstrate no evidence of consolidation.  No evidence of pleural effusion or pneumothorax.  Bones appear intact.  Moderate degenerative changes and moderate atherosclerotic disease.  Left chest wall generator and defibrillator wire noted similar to prior.                                     Assessment/Plan:     Assessment & Plan  Acute on chronic diastolic heart failure  Patient has Diastolic (HFpEF) heart failure that is Acute on chronic. On presentation their CHF was decompensated. Evidence of decompensated CHF on presentation includes: edema, orthopnea, dyspnea on exertion (ABURTO), and shortness of breath. The etiology of their decompensation is likely  dietary indiscretion and medication non-compliance. Most recent BNP and echo results are listed below.  Recent Labs     05/21/25  0837   *     Latest ECHO  Results for orders placed during the hospital encounter of 08/19/22    Echo Saline Bubble? No    Interpretation Summary  · The left ventricle is normal in size with concentric hypertrophy and normal systolic function.  · The estimated PA systolic pressure is 25 mmHg.  · The estimated ejection fraction is 55%.  · Indeterminate left ventricular diastolic function.  · Normal right ventricular size with normal right ventricular systolic function.  · Normal central venous pressure (3 mmHg).  · Mild mitral regurgitation.  · There is abnormal septal wall motion consistent with right ventricular pacemaker.    Current Heart Failure Medications  furosemide injection 40 mg, Daily, Intravenous  hydrALAZINE injection 5 mg, Every 4 hours PRN, Intravenous  carvediloL tablet 6.25 mg, 2 times daily with meals, Oral    Plan  - Monitor strict I&Os and daily weights.    - Place on telemetry  - Low sodium diet  - Place on fluid restriction of 1.5 L.   - Cardiology has been consulted  - The patient's volume status is stable but not at their baseline as indicated by edema, orthopnea, dyspnea on exertion (ABURTO), and shortness of breath  - TTE pending         Hypertension  Patient's blood pressure range in the last 24 hours was: BP  Min: 152/96  Max: 194/100.The patient's inpatient anti-hypertensive regimen is listed below:  Current Antihypertensives  furosemide injection 40 mg, Daily, Intravenous  hydrALAZINE injection 5 mg, Every 4 hours PRN, Intravenous  carvediloL tablet 6.25 mg, 2 times daily with meals, Oral  cloNIDine tablet 0.2 mg, Nightly, Oral    Plan  - BP is uncontrolled, will adjust as follows: restart carvedilol 6.25 mg po bid. Patient has clonidine 0.2 mg nightly written but it appears to be prescribed as clonidine 0.2 mg po bid. Will confirm with patient how she  takes  - Hydralazine PRN  Elevated troponin  -Initial troponin 0.076  -Appears to have chronically elevated troponin  -She currently denies chest pain and/or equivalent  -Serial troponin pending  -Cardiology consulted  -Elevated troponin likely secondary to demand ischemia from decompensated heart failure  -TTE pending    VTE Risk Mitigation (From admission, onward)           Ordered     enoxaparin injection 30 mg  Daily         05/21/25 1034     IP VTE HIGH RISK PATIENT  Once         05/21/25 1031     Place sequential compression device  Until discontinued         05/21/25 1031                         On 05/21/2025, patient should be placed in hospital observation services under Dr. De La Cruz's care in collaboration with Elizabeth Hollingsworth NP.      Pharmacist Renal Dose Adjustment Note    Marysol Yancey is a 89 y.o. female being treated with the medication enoxaparin.    Patient Data:    Vital Signs (Most Recent):  Temp: 98.1 °F (36.7 °C) (05/21/25 0806)  Pulse: 102 (05/21/25 0900)  Resp: 20 (05/21/25 0900)  BP: (!) 165/94 (05/21/25 0900)  SpO2: 99 % (05/21/25 0900) Vital Signs (72h Range):  Temp:  [98.1 °F (36.7 °C)]   Pulse:  [101-108]   Resp:  [16-20]   BP: (152-165)/(80-96)   SpO2:  [92 %-99 %]      Recent Labs   Lab 05/21/25  0837   CREATININE 1.9*     Serum creatinine: 1.9 mg/dL (H) 05/21/25 0837  Estimated creatinine clearance: 18.3 mL/min (A)    Enoxaparin 40 mg sc daily will be changed to enoxaparin 30 mg sc daily per pharmacy renal dosing protocol for CrCl <30 mL/min.    Pharmacist's Name: AB Silva  Department of Hospital Medicine  [facility] - Emergency Dept.

## 2025-05-21 NOTE — ASSESSMENT & PLAN NOTE
Patient has Diastolic (HFpEF) heart failure that is Acute on chronic. On presentation their CHF was decompensated. Evidence of decompensated CHF on presentation includes: edema, orthopnea, dyspnea on exertion (ABUROT), and shortness of breath. The etiology of their decompensation is likely dietary indiscretion and medication non-compliance. Most recent BNP and echo results are listed below.  Recent Labs     05/21/25  0837   *     Latest ECHO  Results for orders placed during the hospital encounter of 08/19/22    Echo Saline Bubble? No    Interpretation Summary  · The left ventricle is normal in size with concentric hypertrophy and normal systolic function.  · The estimated PA systolic pressure is 25 mmHg.  · The estimated ejection fraction is 55%.  · Indeterminate left ventricular diastolic function.  · Normal right ventricular size with normal right ventricular systolic function.  · Normal central venous pressure (3 mmHg).  · Mild mitral regurgitation.  · There is abnormal septal wall motion consistent with right ventricular pacemaker.    Current Heart Failure Medications  furosemide injection 40 mg, Daily, Intravenous  hydrALAZINE injection 5 mg, Every 4 hours PRN, Intravenous  carvediloL tablet 6.25 mg, 2 times daily with meals, Oral    Plan  - Monitor strict I&Os and daily weights.    - Place on telemetry  - Low sodium diet  - Place on fluid restriction of 1.5 L.   - Cardiology has been consulted  - The patient's volume status is stable but not at their baseline as indicated by edema, orthopnea, dyspnea on exertion (ABURTO), and shortness of breath  - TTE pending

## 2025-05-21 NOTE — HPI
Ms. Yancey is a 89 year old female patient whose current medical conditions include CAD, cardiac arrest s/p AICD, CHF, CRI, depression, DM type II, HLD, HTN, LBBB, mitral regurgitation, thyroid disease, and CAD who presented to [facility] ED this AM due to increased SOB over the past few weeks that acutely worsened this AM. Associated symptoms included BLE edema and orthopnea. She denied any associated fever, chills, palpitations, near syncope, or syncope. Initial workup in ED revealed creatinine of 1.9, BNP of 640, troponin of 0.076. CXR showed chronic ILD as well as edema/ ? underlying PNA. Patient given IV Lasix and admitted for further evaluation and treatment. Cardiology consulted to assist with management. Patient seen and examined today, resting in bed.  Still SOB/overloaded. Denies any CP symptoms. States she tried purchasing something OTC for fluid removal but it did not help. She reports compliance with her medications. Last seen in clinic by Dr. Mares in 2022. Prior LHC in 2013 showing LAD 30-40% 100% RCA occlusion, OM1 40-50%

## 2025-05-21 NOTE — ASSESSMENT & PLAN NOTE
-Presents with volume overload/SOB  -Continue IV diuresis  -Continue Coreg  -Last clinic f/u in 2022, TTE pending  -Strict I's/O's

## 2025-05-21 NOTE — ASSESSMENT & PLAN NOTE
-Initial troponin 0.076  -Appears to have chronically elevated troponin  -She currently denies chest pain and/or equivalent  -Serial troponin pending  -Cardiology consulted  -Elevated troponin likely secondary to demand ischemia from decompensated heart failure  -TTE pending

## 2025-05-22 ENCOUNTER — DOCUMENTATION ONLY (OUTPATIENT)
Dept: CARDIOLOGY | Facility: CLINIC | Age: OVER 89
End: 2025-05-22
Payer: MEDICARE

## 2025-05-22 PROBLEM — N18.9 ACUTE KIDNEY INJURY SUPERIMPOSED ON CHRONIC KIDNEY DISEASE: Status: ACTIVE | Noted: 2025-05-22

## 2025-05-22 PROBLEM — N17.9 ACUTE KIDNEY INJURY SUPERIMPOSED ON CHRONIC KIDNEY DISEASE: Status: ACTIVE | Noted: 2025-05-22

## 2025-05-22 LAB
ABSOLUTE EOSINOPHIL (OHS): 0.49 K/UL
ABSOLUTE MONOCYTE (OHS): 0.69 K/UL (ref 0.3–1)
ABSOLUTE NEUTROPHIL COUNT (OHS): 3.68 K/UL (ref 1.8–7.7)
ALBUMIN SERPL BCP-MCNC: 3 G/DL (ref 3.5–5.2)
ALP SERPL-CCNC: 67 UNIT/L (ref 40–150)
ALT SERPL W/O P-5'-P-CCNC: 13 UNIT/L (ref 10–44)
ANION GAP (OHS): 9 MMOL/L (ref 8–16)
AST SERPL-CCNC: 18 UNIT/L (ref 11–45)
BASOPHILS # BLD AUTO: 0.11 K/UL
BASOPHILS NFR BLD AUTO: 1.7 %
BILIRUB SERPL-MCNC: 0.4 MG/DL (ref 0.1–1)
BUN SERPL-MCNC: 29 MG/DL (ref 10–30)
CALCIUM SERPL-MCNC: 8.6 MG/DL (ref 8.7–10.5)
CHLORIDE SERPL-SCNC: 107 MMOL/L (ref 95–110)
CO2 SERPL-SCNC: 25 MMOL/L (ref 23–29)
CREAT SERPL-MCNC: 1.8 MG/DL (ref 0.5–1.4)
EAG (OHS): 100 MG/DL (ref 68–131)
ERYTHROCYTE [DISTWIDTH] IN BLOOD BY AUTOMATED COUNT: 13.7 % (ref 11.5–14.5)
GFR SERPLBLD CREATININE-BSD FMLA CKD-EPI: 26 ML/MIN/1.73/M2
GLUCOSE SERPL-MCNC: 95 MG/DL (ref 70–110)
HBA1C MFR BLD: 5.1 % (ref 4–5.6)
HCT VFR BLD AUTO: 40.8 % (ref 37–48.5)
HGB BLD-MCNC: 12.9 GM/DL (ref 12–16)
IMM GRANULOCYTES # BLD AUTO: 0.02 K/UL (ref 0–0.04)
IMM GRANULOCYTES NFR BLD AUTO: 0.3 % (ref 0–0.5)
LYMPHOCYTES # BLD AUTO: 1.59 K/UL (ref 1–4.8)
MAGNESIUM SERPL-MCNC: 2.2 MG/DL (ref 1.6–2.6)
MCH RBC QN AUTO: 31.1 PG (ref 27–31)
MCHC RBC AUTO-ENTMCNC: 31.6 G/DL (ref 32–36)
MCV RBC AUTO: 98 FL (ref 82–98)
NUCLEATED RBC (/100WBC) (OHS): 0 /100 WBC
PHOSPHATE SERPL-MCNC: 3.5 MG/DL (ref 2.7–4.5)
PLATELET # BLD AUTO: 225 K/UL (ref 150–450)
PMV BLD AUTO: 9.8 FL (ref 9.2–12.9)
POTASSIUM SERPL-SCNC: 3.2 MMOL/L (ref 3.5–5.1)
PROT SERPL-MCNC: 6.1 GM/DL (ref 6–8.4)
RBC # BLD AUTO: 4.15 M/UL (ref 4–5.4)
RELATIVE EOSINOPHIL (OHS): 7.4 %
RELATIVE LYMPHOCYTE (OHS): 24.2 % (ref 18–48)
RELATIVE MONOCYTE (OHS): 10.5 % (ref 4–15)
RELATIVE NEUTROPHIL (OHS): 55.9 % (ref 38–73)
SODIUM SERPL-SCNC: 141 MMOL/L (ref 136–145)
TROPONIN I SERPL DL<=0.01 NG/ML-MCNC: 0.06 NG/ML
WBC # BLD AUTO: 6.58 K/UL (ref 3.9–12.7)

## 2025-05-22 PROCEDURE — 63600175 PHARM REV CODE 636 W HCPCS: Performed by: NURSE PRACTITIONER

## 2025-05-22 PROCEDURE — 84100 ASSAY OF PHOSPHORUS: CPT | Performed by: STUDENT IN AN ORGANIZED HEALTH CARE EDUCATION/TRAINING PROGRAM

## 2025-05-22 PROCEDURE — 25000003 PHARM REV CODE 250: Performed by: STUDENT IN AN ORGANIZED HEALTH CARE EDUCATION/TRAINING PROGRAM

## 2025-05-22 PROCEDURE — 83735 ASSAY OF MAGNESIUM: CPT | Performed by: STUDENT IN AN ORGANIZED HEALTH CARE EDUCATION/TRAINING PROGRAM

## 2025-05-22 PROCEDURE — 21400001 HC TELEMETRY ROOM

## 2025-05-22 PROCEDURE — 97162 PT EVAL MOD COMPLEX 30 MIN: CPT

## 2025-05-22 PROCEDURE — 63600175 PHARM REV CODE 636 W HCPCS: Performed by: STUDENT IN AN ORGANIZED HEALTH CARE EDUCATION/TRAINING PROGRAM

## 2025-05-22 PROCEDURE — 85025 COMPLETE CBC W/AUTO DIFF WBC: CPT | Performed by: STUDENT IN AN ORGANIZED HEALTH CARE EDUCATION/TRAINING PROGRAM

## 2025-05-22 PROCEDURE — 84484 ASSAY OF TROPONIN QUANT: CPT | Performed by: PHYSICIAN ASSISTANT

## 2025-05-22 PROCEDURE — 36415 COLL VENOUS BLD VENIPUNCTURE: CPT | Performed by: PHYSICIAN ASSISTANT

## 2025-05-22 PROCEDURE — 36415 COLL VENOUS BLD VENIPUNCTURE: CPT | Performed by: STUDENT IN AN ORGANIZED HEALTH CARE EDUCATION/TRAINING PROGRAM

## 2025-05-22 PROCEDURE — 82247 BILIRUBIN TOTAL: CPT | Performed by: STUDENT IN AN ORGANIZED HEALTH CARE EDUCATION/TRAINING PROGRAM

## 2025-05-22 PROCEDURE — 97166 OT EVAL MOD COMPLEX 45 MIN: CPT

## 2025-05-22 PROCEDURE — 99232 SBSQ HOSP IP/OBS MODERATE 35: CPT | Mod: ,,,

## 2025-05-22 PROCEDURE — 27000221 HC OXYGEN, UP TO 24 HOURS

## 2025-05-22 PROCEDURE — 97110 THERAPEUTIC EXERCISES: CPT

## 2025-05-22 PROCEDURE — 25000003 PHARM REV CODE 250: Performed by: NURSE PRACTITIONER

## 2025-05-22 PROCEDURE — 83036 HEMOGLOBIN GLYCOSYLATED A1C: CPT | Performed by: STUDENT IN AN ORGANIZED HEALTH CARE EDUCATION/TRAINING PROGRAM

## 2025-05-22 PROCEDURE — 94761 N-INVAS EAR/PLS OXIMETRY MLT: CPT

## 2025-05-22 RX ORDER — POTASSIUM CHLORIDE 20 MEQ/1
40 TABLET, EXTENDED RELEASE ORAL
Status: DISCONTINUED | OUTPATIENT
Start: 2025-05-22 | End: 2025-05-22

## 2025-05-22 RX ORDER — POTASSIUM CHLORIDE 20 MEQ/1
40 TABLET, EXTENDED RELEASE ORAL ONCE
Status: COMPLETED | OUTPATIENT
Start: 2025-05-22 | End: 2025-05-22

## 2025-05-22 RX ADMIN — FUROSEMIDE 40 MG: 10 INJECTION, SOLUTION INTRAMUSCULAR; INTRAVENOUS at 08:05

## 2025-05-22 RX ADMIN — Medication 6 MG: at 09:05

## 2025-05-22 RX ADMIN — MUPIROCIN: 20 OINTMENT TOPICAL at 08:05

## 2025-05-22 RX ADMIN — CARVEDILOL 6.25 MG: 6.25 TABLET, FILM COATED ORAL at 05:05

## 2025-05-22 RX ADMIN — CLONIDINE HYDROCHLORIDE 0.2 MG: 0.2 TABLET ORAL at 08:05

## 2025-05-22 RX ADMIN — SENNOSIDES AND DOCUSATE SODIUM 1 TABLET: 50; 8.6 TABLET ORAL at 08:05

## 2025-05-22 RX ADMIN — CARVEDILOL 6.25 MG: 6.25 TABLET, FILM COATED ORAL at 08:05

## 2025-05-22 RX ADMIN — POTASSIUM CHLORIDE 40 MEQ: 1500 TABLET, EXTENDED RELEASE ORAL at 01:05

## 2025-05-22 RX ADMIN — ENOXAPARIN SODIUM 30 MG: 30 INJECTION SUBCUTANEOUS at 05:05

## 2025-05-22 NOTE — PLAN OF CARE
See eval for details. Pt displayed deficits with functional mobility/ transfers, deficits with adl's skills also decrease b ue strength/endurance. Recommendation: low vs mod intensity

## 2025-05-22 NOTE — PROGRESS NOTES
[facility] - Tele[facility] ([facility])  Cardiology  Progress Note    Patient Name: Marysol Yancey  MRN: [id number]  Admission Date: 5/21/2025  Hospital Length of Stay: 1 days  Code Status: DNR   Attending Physician: Bret De La Cruz DO   Primary Care Physician: Bruno Nugent MD  Expected Discharge Date:   Principal Problem:Acute on chronic diastolic heart failure    Subjective:     HPI:     Ms. Yancey is a 89 year old female patient whose current medical conditions include CAD, cardiac arrest s/p AICD, CHF, CRI, depression, DM type II, HLD, HTN, LBBB, mitral regurgitation, thyroid disease, and CAD who presented to [facility] ED this AM due to increased SOB over the past few weeks that acutely worsened this AM. Associated symptoms included BLE edema and orthopnea. She denied any associated fever, chills, palpitations, near syncope, or syncope. Initial workup in ED revealed creatinine of 1.9, BNP of 640, troponin of 0.076. CXR showed chronic ILD as well as edema/ ? underlying PNA. Patient given IV Lasix and admitted for further evaluation and treatment. Cardiology consulted to assist with management. Patient seen and examined today, resting in bed.  Still SOB/overloaded. Denies any CP symptoms. States she tried purchasing something OTC for fluid removal but it did not help. She reports compliance with her medications. Last seen in clinic by Dr. Mares in 2022. Prior [facility] in 2013 showing LAD 30-40% 100% RCA occlusion, OM1 40-50%     Hospital Course:   5/22/2025 - Patient seen and examined today. She is laying in bed in no acute distress. No acute events overnight. She reports she is still short of breath. Denies chest pain.       Review of Systems   Constitutional: Positive for malaise/fatigue.   HENT: Negative.     Eyes: Negative.    Cardiovascular:  Positive for dyspnea on exertion, leg swelling and orthopnea. Negative for chest pain.   Respiratory:  Positive for shortness of breath.    Skin: Negative.    Musculoskeletal:  Negative.    Gastrointestinal: Negative.    Genitourinary: Negative.    Neurological:  Positive for weakness.   Psychiatric/Behavioral: Negative.       Objective:     Vital Signs (Most Recent):  Temp: 98.2 °F (36.8 °C) (05/22/25 0739)  Pulse: 72 (05/22/25 0823)  Resp: 18 (05/22/25 0739)  BP: 134/69 (05/22/25 0739)  SpO2: 96 % (05/22/25 0739) Vital Signs (24h Range):  Temp:  [97.7 °F (36.5 °C)-98.2 °F (36.8 °C)] 98.2 °F (36.8 °C)  Pulse:  [] 72  Resp:  [17-20] 18  SpO2:  [92 %-98 %] 96 %  BP: (119-188)/(69-97) 134/69     Weight: 73.7 kg (162 lb 7.7 oz)  Body mass index is 31.73 kg/m².     SpO2: 96 %         Intake/Output Summary (Last 24 hours) at 5/22/2025 1106  Last data filed at 5/22/2025 1039  Gross per 24 hour   Intake 1040 ml   Output 3325 ml   Net -2285 ml       Lines/Drains/Airways       Drain  Duration                  Urethral Catheter 05/21/25 1100 1 day              Peripheral Intravenous Line  Duration                  Peripheral IV - Single Lumen 05/21/25 0838 20 G 1 3/4 in Anterior;Right Upper Arm 1 day                       Physical Exam  Vitals and nursing note reviewed.   Constitutional:       Appearance: Normal appearance.   HENT:      Head: Normocephalic.   Eyes:      Pupils: Pupils are equal, round, and reactive to light.   Cardiovascular:      Rate and Rhythm: Regular rhythm. Tachycardia present.      Heart sounds: Normal heart sounds, S1 normal and S2 normal. No murmur heard.     No S3 or S4 sounds.   Pulmonary:      Effort: Pulmonary effort is normal.      Breath sounds: Rales present.   Abdominal:      Palpations: Abdomen is soft.   Musculoskeletal:         General: Normal range of motion.      Cervical back: Normal range of motion.      Right lower leg: Edema present.      Left lower leg: Edema present.   Skin:     Capillary Refill: Capillary refill takes less than 2 seconds.   Neurological:      General: No focal deficit present.      Mental Status: She is alert and oriented to  person, place, and time.   Psychiatric:         Mood and Affect: Mood normal.         Behavior: Behavior normal.         Thought Content: Thought content normal.            Significant Labs: BMP:   Recent Labs   Lab 05/21/25  0837 05/22/25  0458    95    141   K 3.9 3.2*    107   CO2 19* 25   BUN 29 29   CREATININE 1.9* 1.8*   CALCIUM 9.9 8.6*   MG  --  2.2   , CMP   Recent Labs   Lab 05/21/25  0837 05/22/25 0458    141   K 3.9 3.2*    107   CO2 19* 25    95   BUN 29 29   CREATININE 1.9* 1.8*   CALCIUM 9.9 8.6*   PROT 8.1 6.1   ALBUMIN 3.9 3.0*   BILITOT 0.3 0.4   ALKPHOS 82 67   AST 22 18   ALT 19 13   ANIONGAP 14 9   , CBC   Recent Labs   Lab 05/21/25  0837 05/22/25 0458   WBC 8.69 6.58   HGB 15.1 12.9   HCT 47.2 40.8    225   , and All pertinent lab results from the last 24 hours have been reviewed.    Significant Imaging: Echocardiogram: Transthoracic echo (TTE) complete ([facility] Only):   Results for orders placed or performed during the hospital encounter of 05/21/25   Echo   Result Value Ref Range    BSA 1.86 m2    LVOT stroke volume 54.6 cm3    LVIDd 4.1 3.5 - 6.0 cm    LV Systolic Volume 27 mL    LV Systolic Volume Index 15.1 mL/m2    LVIDs 2.7 2.1 - 4.0 cm    LV Diastolic Volume 72 mL    LV Diastolic Volume Index 40.22 mL/m2    Left Ventricular End Systolic Volume by Teichholz Method 27.12 mL    Left Ventricular End Diastolic Volume by Teichholz Method 72.30 mL    IVS 1.5 (A) 0.6 - 1.1 cm    LVOT diameter 2.0 cm    LVOT area 3.1 cm2    FS 34.1 28 - 44 %    Left Ventricle Relative Wall Thickness 0.63 cm    PW 1.3 (A) 0.6 - 1.1 cm    LV mass 216.6 g    LV Mass Index 121.0 g/m2    MV Peak E Pierce 0.84 m/s    TDI LATERAL 0.05 m/s    TDI SEPTAL 0.04 m/s    E/E' ratio 19 m/s    MV Peak A Pierce 1.32 m/s    TR Max Pierce 2.4 m/s    E/A ratio 0.64     IVRT 57 msec    E wave deceleration time 181 msec    LV SEPTAL E/E' RATIO 21.0 m/s    LV LATERAL E/E' RATIO 16.8 m/s    LVOT peak  jyoti 1.0 m/s    Left Ventricular Outflow Tract Mean Velocity 0.83 cm/s    Left Ventricular Outflow Tract Mean Gradient 2.85 mmHg    RVOT peak VTI 13.4 cm    TAPSE 1.9 cm    LA size 4.2 cm    Left Atrium Minor Axis 4.9 cm    Left Atrium Major Axis 4.7 cm    RA Major Axis 3.08 cm    AV mean gradient 4 mmHg    AV peak gradient 8 mmHg    Ao peak jyoti 1.4 m/s    Ao VTI 25.3 cm    LVOT peak VTI 17.4 cm    AV valve area 2.2 cm²    AV Velocity Ratio 0.71     AV index (prosthetic) 0.69     DESTINEY by Velocity Ratio 2.2 cm²    Mr max jyoti 4.39 m/s    MV stenosis pressure 1/2 time 52.43 ms    MV valve area p 1/2 method 4.20 cm2    TV mean gradient 25 mmHg    Triscuspid Valve Regurgitation Peak Gradient 23 mmHg    PV mean gradient 3 mmHg    RVOT peak jyoti 0.93 m/s    Ao root annulus 2.8 cm    STJ 2.8 cm    Ascending aorta 2.6 cm    ASI 1.5 cm/m2    IVC diameter 1.44 cm    Mean e' 0.05 m/s    ZLVIDS -0.99     ZLVIDD -1.88     IGOR 31 mL/m2    LA Vol 55 cm3    LA WIDTH 3.2 cm    RA Width 1.8 cm    EF 50 %    TV resting pulmonary artery pressure 26 mmHg    RV TB RVSP 5 mmHg    Est. RA pres 3 mmHg    Narrative      Left Ventricle: The left ventricle is normal in size. Moderately   increased wall thickness. There is concentric hypertrophy. Normal wall   motion. Septal motion is consistent with bundle branch block. There is low   normal systolic function. Ejection fraction is approximately 50%.    Right Ventricle: The right ventricle is normal in size Wall thickness   is normal. Systolic function is normal.    Pulmonary Artery: The estimated pulmonary artery systolic pressure is   26 mmHg.    IVC/SVC: Normal venous pressure at 3 mmHg.     , EKG: sinus tachycardia with premature atrial complexes and X-Ray: CXR: X-Ray: Acute on chronic interstitial edema or pneumonia not entirely excluded. The lungs demonstrate no evidence of consolidation. No evidence of pleural effusion or pneumothorax.   Assessment and Plan:     Patient who presents with  SOB/decompensated CHF. Continued SOB. Elevated troponin due to demand ischemia, trending down. Continue IV diuresis. Optimize meds as tolerated.         * Acute on chronic diastolic heart failure  -Presents with volume overload/SOB  -Continue IV diuresis  -Continue Coreg  -Last clinic f/u in 2022, TTE pending  -Strict I's/O's    5/22/25  -Continue IV diuresis   -Continue Coreg   - Echo reviewed   - Strict I's/O's    Elevated troponin  -Initial troponin 0.076, elevation likely due to demand ischemia from elevated BP/decompensated CHF/renal failure  -No CP  -ASA if tolerated  -Continue BB  -Prior allergy to statin/BB  -TTE pending    5/22/25  -troponin trending down  -No CP   - ASA if tolerated   -Continue BB       Hypertension  -Continue BB  -Titrate meds  -BP elevated upon admission    5/22/25  - Continue BB   - Titrate meds    ICD (implantable cardioverter-defibrillator) in place  -Denies AICD shocks        VTE Risk Mitigation (From admission, onward)           Ordered     enoxaparin injection 30 mg  Daily         05/21/25 1034     IP VTE HIGH RISK PATIENT  Once         05/21/25 1031     Place sequential compression device  Until discontinued         05/21/25 1031                    Henna Stone PA-C  Cardiology  [facility] - Telemetry ([facility])

## 2025-05-22 NOTE — PROGRESS NOTES
"[facility]) nurse navigator notified of HFTCC candidate in need of education and introduction to 4-6 week program.      PT aao  while lying in bed . Introduced self to pt as HFTCC nurse navigator.     Patient given "Home Care Guide for Heart Failure Patients" , "Heart Failure [facility]" flyer and "Daily weight and symptom tracker".  Encouraged pt to review information.      Reviewed the following key points of HFTCC program with pt and family:   1.) Take your medications as directed.    2.) Weight yourself daily   3.) Follow low salt and limited fluid diet.    4.) Stop smoking and start exercising   5.) Go to your appointments and call your team.      Pt reminded to follow Symptom tracker and to call at the onset of symptoms according to tracker.     Reviewed plan for follow up once discharged to include phone calls, in person and virtual visits to assist pt optimizing their heart failure medication regimen and encouraging healthy lifestyle modifications.  Reminded pt that program will assist them over the next 4-6 weeks and then patient will be transferred to long term care provider .     Pt given appointment or instructed appointment will be printed on hospital discharge paperwork.     Pt also reminded HF nurse will call 48-72 hours after discharge to check on them.     PT verbalize read back of information given.  Encouraged pt to read over information often and contact team with any questions or concerns.      "

## 2025-05-22 NOTE — ASSESSMENT & PLAN NOTE
-Presents with volume overload/SOB  -Continue IV diuresis  -Continue Coreg  -Last clinic f/u in 2022, TTE pending  -Strict I's/O's    5/22/25  -Continue IV diuresis   -Continue Coreg   - Echo reviewed   - Strict I's/O's

## 2025-05-22 NOTE — SUBJECTIVE & OBJECTIVE
Review of Systems   Constitutional: Positive for malaise/fatigue.   HENT: Negative.     Eyes: Negative.    Cardiovascular:  Positive for dyspnea on exertion, leg swelling and orthopnea. Negative for chest pain.   Respiratory:  Positive for shortness of breath.    Skin: Negative.    Musculoskeletal: Negative.    Gastrointestinal: Negative.    Genitourinary: Negative.    Neurological:  Positive for weakness.   Psychiatric/Behavioral: Negative.       Objective:     Vital Signs (Most Recent):  Temp: 98.2 °F (36.8 °C) (05/22/25 0739)  Pulse: 72 (05/22/25 0823)  Resp: 18 (05/22/25 0739)  BP: 134/69 (05/22/25 0739)  SpO2: 96 % (05/22/25 0739) Vital Signs (24h Range):  Temp:  [97.7 °F (36.5 °C)-98.2 °F (36.8 °C)] 98.2 °F (36.8 °C)  Pulse:  [] 72  Resp:  [17-20] 18  SpO2:  [92 %-98 %] 96 %  BP: (119-188)/(69-97) 134/69     Weight: 73.7 kg (162 lb 7.7 oz)  Body mass index is 31.73 kg/m².     SpO2: 96 %         Intake/Output Summary (Last 24 hours) at 5/22/2025 1106  Last data filed at 5/22/2025 1039  Gross per 24 hour   Intake 1040 ml   Output 3325 ml   Net -2285 ml       Lines/Drains/Airways       Drain  Duration                  Urethral Catheter 05/21/25 1100 1 day              Peripheral Intravenous Line  Duration                  Peripheral IV - Single Lumen 05/21/25 0838 20 G 1 3/4 in Anterior;Right Upper Arm 1 day                       Physical Exam  Vitals and nursing note reviewed.   Constitutional:       Appearance: Normal appearance.   HENT:      Head: Normocephalic.   Eyes:      Pupils: Pupils are equal, round, and reactive to light.   Cardiovascular:      Rate and Rhythm: Regular rhythm. Tachycardia present.      Heart sounds: Normal heart sounds, S1 normal and S2 normal. No murmur heard.     No S3 or S4 sounds.   Pulmonary:      Effort: Pulmonary effort is normal.      Breath sounds: Rales present.   Abdominal:      Palpations: Abdomen is soft.   Musculoskeletal:         General: Normal range of motion.       Cervical back: Normal range of motion.      Right lower leg: Edema present.      Left lower leg: Edema present.   Skin:     Capillary Refill: Capillary refill takes less than 2 seconds.   Neurological:      General: No focal deficit present.      Mental Status: She is alert and oriented to person, place, and time.   Psychiatric:         Mood and Affect: Mood normal.         Behavior: Behavior normal.         Thought Content: Thought content normal.            Significant Labs: BMP:   Recent Labs   Lab 05/21/25  0837 05/22/25  0458    95    141   K 3.9 3.2*    107   CO2 19* 25   BUN 29 29   CREATININE 1.9* 1.8*   CALCIUM 9.9 8.6*   MG  --  2.2   , CMP   Recent Labs   Lab 05/21/25  0837 05/22/25  0458    141   K 3.9 3.2*    107   CO2 19* 25    95   BUN 29 29   CREATININE 1.9* 1.8*   CALCIUM 9.9 8.6*   PROT 8.1 6.1   ALBUMIN 3.9 3.0*   BILITOT 0.3 0.4   ALKPHOS 82 67   AST 22 18   ALT 19 13   ANIONGAP 14 9   , CBC   Recent Labs   Lab 05/21/25  0837 05/22/25  0458   WBC 8.69 6.58   HGB 15.1 12.9   HCT 47.2 40.8    225   , and All pertinent lab results from the last 24 hours have been reviewed.    Significant Imaging: Echocardiogram: Transthoracic echo (TTE) complete (Cupid Only):   Results for orders placed or performed during the hospital encounter of 05/21/25   Echo   Result Value Ref Range    BSA 1.86 m2    LVOT stroke volume 54.6 cm3    LVIDd 4.1 3.5 - 6.0 cm    LV Systolic Volume 27 mL    LV Systolic Volume Index 15.1 mL/m2    LVIDs 2.7 2.1 - 4.0 cm    LV Diastolic Volume 72 mL    LV Diastolic Volume Index 40.22 mL/m2    Left Ventricular End Systolic Volume by Teichholz Method 27.12 mL    Left Ventricular End Diastolic Volume by Teichholz Method 72.30 mL    IVS 1.5 (A) 0.6 - 1.1 cm    LVOT diameter 2.0 cm    LVOT area 3.1 cm2    FS 34.1 28 - 44 %    Left Ventricle Relative Wall Thickness 0.63 cm    PW 1.3 (A) 0.6 - 1.1 cm    LV mass 216.6 g    LV Mass Index 121.0  g/m2    MV Peak E Pierce 0.84 m/s    TDI LATERAL 0.05 m/s    TDI SEPTAL 0.04 m/s    E/E' ratio 19 m/s    MV Peak A Pierce 1.32 m/s    TR Max Pierce 2.4 m/s    E/A ratio 0.64     IVRT 57 msec    E wave deceleration time 181 msec    LV SEPTAL E/E' RATIO 21.0 m/s    LV LATERAL E/E' RATIO 16.8 m/s    LVOT peak pierce 1.0 m/s    Left Ventricular Outflow Tract Mean Velocity 0.83 cm/s    Left Ventricular Outflow Tract Mean Gradient 2.85 mmHg    RVOT peak VTI 13.4 cm    TAPSE 1.9 cm    LA size 4.2 cm    Left Atrium Minor Axis 4.9 cm    Left Atrium Major Axis 4.7 cm    RA Major Axis 3.08 cm    AV mean gradient 4 mmHg    AV peak gradient 8 mmHg    Ao peak pierce 1.4 m/s    Ao VTI 25.3 cm    LVOT peak VTI 17.4 cm    AV valve area 2.2 cm²    AV Velocity Ratio 0.71     AV index (prosthetic) 0.69     DESTINEY by Velocity Ratio 2.2 cm²    Mr max pierce 4.39 m/s    MV stenosis pressure 1/2 time 52.43 ms    MV valve area p 1/2 method 4.20 cm2    TV mean gradient 25 mmHg    Triscuspid Valve Regurgitation Peak Gradient 23 mmHg    PV mean gradient 3 mmHg    RVOT peak pierce 0.93 m/s    Ao root annulus 2.8 cm    STJ 2.8 cm    Ascending aorta 2.6 cm    ASI 1.5 cm/m2    IVC diameter 1.44 cm    Mean e' 0.05 m/s    ZLVIDS -0.99     ZLVIDD -1.88     IGOR 31 mL/m2    LA Vol 55 cm3    LA WIDTH 3.2 cm    RA Width 1.8 cm    EF 50 %    TV resting pulmonary artery pressure 26 mmHg    RV TB RVSP 5 mmHg    Est. RA pres 3 mmHg    Narrative      Left Ventricle: The left ventricle is normal in size. Moderately   increased wall thickness. There is concentric hypertrophy. Normal wall   motion. Septal motion is consistent with bundle branch block. There is low   normal systolic function. Ejection fraction is approximately 50%.    Right Ventricle: The right ventricle is normal in size Wall thickness   is normal. Systolic function is normal.    Pulmonary Artery: The estimated pulmonary artery systolic pressure is   26 mmHg.    IVC/SVC: Normal venous pressure at 3 mmHg.     , EKG:  sinus tachycardia with premature atrial complexes and X-Ray: CXR: X-Ray: Acute on chronic interstitial edema or pneumonia not entirely excluded. The lungs demonstrate no evidence of consolidation. No evidence of pleural effusion or pneumothorax.

## 2025-05-22 NOTE — PROGRESS NOTES
[facility] - Telemetry ([facility])  Wound Care    Patient Name:  Marysol Yancey   MRN:  [id number]  Date: 5/22/2025  Diagnosis: Acute on chronic diastolic heart failure    History:     Past Medical History:   Diagnosis Date    Abnormal ECG 12/17/2014    AICD (automatic cardioverter/defibrillator) present 6/14/2013    CAD (coronary artery disease) 6/14/2013    Cancer     basil cell carcinoma    Cardiac arrest 6/14/2013    CHF (congestive heart failure)     CRI (chronic renal insufficiency) 6/14/2013    Depression 6/14/2013    Diabetes mellitus     Fatigue 6/14/2013    Heart block     Hyperlipidemia     Hypertension     LBBB (left bundle branch block) 12/17/2014    Mitral regurgitation 6/14/2013    Mixed hyperlipidemia 6/14/2013    Pulmonary nodule 6/14/2013    Thyroid disease     TR (tricuspid regurgitation) 6/14/2013    Ventricular fibrillation 6/14/2013       Social History[1]    Precautions:     Allergies as of 05/21/2025 - Reviewed 05/21/2025   Allergen Reaction Noted    Zetia [ezetimibe]  12/17/2014    Atorvastatin  06/14/2013    Avelox [moxifloxacin]  06/14/2013    Erythromycin  06/14/2013    Lisinopril  06/14/2013    Norvasc [amlodipine]  06/14/2013    Pcn [penicillins]  06/14/2013    Statins-hmg-coa reductase inhibitors  06/14/2013    Cefepime Rash 09/07/2022    Tylenol [acetaminophen] Rash 08/07/2023       [facility] Assessment Details/Treatment     F/U visit with this 89 y/o female patient to reassess BLE.     Patient resting in bed, awake and alert. Wound care nurse gave introduction and reason for visit. Patient agreeable to assessment and treatment.     Socks and dressings gently removed from BLE.   --Again noted several ulcerated areas to the bilateral anterior lower legs. BLE appear less edematous this visit and no drainage or weeping is noted to any spots on the legs.     Right lateral proximal lower leg with 2 ulcerations one dried maroon scabbed area and one moist pink/red opening. Right distal lower leg also with  2 open ulcerations as well as scattered intact blisters that appear to be flattening, one opening is maroon scabbed over and the other partially dry tan with moist pink/red other half.   Left medial lower leg with one moderate sized dry crusted maroon/purple scab.   Left lateral lower leg with several scattered dry maroon and red ulcerations, some of which are scabbed over.   Cleansed all these areas with vashe. Patted dry. Painted with cavilon and applied bordered foam dressings. Recommend changing twice weekly.     Right plantar foot with several dried tan calluses one of which wound care nurse was able to gently remove revealing intact skin, at the distal end beneath the toes. Left foot also noted to have several intact tan calluses mostly near the toes. Again noted area to the right dorsal foot that is dry and cracked with partial thickness openings, now noted areas to the lateral right foot near the ankle as well. No drainage to either of these areas. Cleansed bilateral feet with vashe moistened gauze. Patted dry. Applies moisturizer to BLE. Recommend applying daily and PRN.          --Intertrigo also noted to Right abdominal fold with very moist intact skin with pink/red discoloration. Left abdominal fold intact & dry no moisture or redness noted. Cleansed fold with bath wipes gently. Patted dry. Applied zinc moisture barrier paste. Recommend applying BID and PRN.     Will update wound care orders.   Plan to F/U next week.      05/22/25 0909   WOCN Assessment   WOCN Total Time (mins) 45   Visit Date 05/22/25   Visit Time 0909   Consult Type Follow Up   WOCN Speciality Wound   Wound moisture;other  (ulcerations & blistering due to swelling, dry cracked skin)   Intervention assessed;changed;applied;chart review;orders   Teaching on-going        Wound 05/21/25 1508 Ulceration Right lateral;lower Leg   Date First Assessed/Time First Assessed: 05/21/25 1508   Present on Original Admission: Yes  Primary Wound Type:  Ulceration  Side: Right  Orientation: lateral;lower  Location: Leg   Wound Image     Dressing Appearance Clean;Dry;Intact   Drainage Amount None   Drainage Characteristics/Odor No odor   Appearance Red;Maroon;Pink;Blistered;Dry   Tissue loss description Full thickness   Periwound Area Intact;Dry;Blistered   Care Cleansed with:;Antimicrobial agent;Applied:;Skin Barrier   Dressing Applied;Foam   Periwound Care Moisturizer applied   Dressing Change Due 05/26/25        Wound 05/21/25 1508 Ulceration Left anterior;lower Leg   Date First Assessed/Time First Assessed: 05/21/25 1508   Present on Original Admission: Yes  Primary Wound Type: Ulceration  Side: Left  Orientation: anterior;lower  Location: Leg   Wound Image     Dressing Appearance Clean;Dry;Intact   Drainage Amount None   Drainage Characteristics/Odor No odor   Appearance Red;Pink;Maroon;Dry   Tissue loss description Full thickness   Periwound Area Intact;Dry   Care Cleansed with:;Antimicrobial agent;Applied:;Skin Barrier   Dressing Applied;Foam   Periwound Care Moisturizer applied   Dressing Change Due 05/26/25        Wound 05/21/25 1508 Ulceration Right dorsal Foot   Date First Assessed/Time First Assessed: 05/21/25 1508   Present on Original Admission: Yes  Primary Wound Type: Ulceration  Side: Right  Orientation: dorsal  Location: Foot   Wound Image    Dressing Appearance Clean;Dry;Intact   Drainage Amount None   Drainage Characteristics/Odor No odor   Appearance Pink;Red;Dry   Tissue loss description Partial thickness   Periwound Area Intact;Dry   Care Cleansed with:;Antimicrobial agent;Applied:;Skin Barrier;Moisturizing agent   Periwound Care Moisturizer applied   Dressing Change Due 05/23/25        Wound 05/22/25 0909 Intertrigo Right other (see comments)   Date First Assessed/Time First Assessed: 05/22/25 0909   Primary Wound Type: Intertrigo  Side: Right  Location: (c) other (see comments)   Wound Image    Dressing Appearance Open to air   Drainage  Amount None   Drainage Characteristics/Odor No odor   Appearance Intact;Pink;Red;Moist   Tissue loss description Not applicable   Periwound Area Intact;Moist   Care Cleansed with:;Soap and water;Applied:;Other (see comments)  (zinc barrier paste)   Periwound Care Moisture barrier applied   Dressing Change Due 05/22/25 05/22/2025         [1]   Social History  Socioeconomic History    Marital status: Single   Tobacco Use    Smoking status: Never    Smokeless tobacco: Never   Substance and Sexual Activity    Alcohol use: Yes     Alcohol/week: 7.0 standard drinks of alcohol     Types: 7 Glasses of wine per week    Drug use: No     Social Drivers of Health     Financial Resource Strain: High Risk (5/4/2024)    [facility]    Overall Financial Resource Strain (CARDIA)     Difficulty of Paying Living Expenses: Hard   Food Insecurity: No Food Insecurity (5/4/2024)    [facility]    Hunger Vital Sign     Worried About Running Out of Food in the Last Year: Never true     Ran Out of Food in the Last Year: Never true   Transportation Needs: No Transportation Needs (5/4/2024)    [facility]    PRAPARE - Transportation     Lack of Transportation (Medical): No     Lack of Transportation (Non-Medical): No   Physical Activity: Inactive (5/4/2024)    [facility]    Exercise Vital Sign     Days of Exercise per Week: 0 days     Minutes of Exercise per Session: 30 min   Stress: Stress Concern Present (5/4/2024)    Received from [facility]    [place] [facility] [employer] [facility] - Occupational Stress  Questionnaire     Feeling of Stress : Rather much   Housing Stability: Low Risk  (5/4/2024)    Received from [facility]    Housing Stability Vital Sign     Unable to Pay for Housing in the Last Year: No     Number of Places Lived in the Last Year: 1     Unstable Housing in the Last Year: No

## 2025-05-22 NOTE — HOSPITAL COURSE
5/22/2025 - Patient seen and examined today. She is laying in bed in no acute distress. No acute events overnight. She reports she is still short of breath. Denies chest pain.     5/23/2025-Patient seen and examined today, resting in bed. More SOB this AM after Chin was removed. Complains of urinary retention/abdominal bloating. BLE edema improving. Labs reviewed. Creatinine 1.9.

## 2025-05-22 NOTE — ASSESSMENT & PLAN NOTE
-Continue BB  -Titrate meds  -BP elevated upon admission    5/22/25  - Continue BB   - Titrate meds

## 2025-05-22 NOTE — PLAN OF CARE
[facility] - Telemetry (Hospital)  Initial Discharge Assessment       Primary Care Provider: Bruno Nugent MD    Admission Diagnosis: CHF (congestive heart failure) [I50.9]  SOB (shortness of breath) [R06.02]  Hypoxia [R09.02]  Acute on chronic diastolic congestive heart failure [I50.33]  Congestive heart failure, unspecified HF chronicity, unspecified heart failure type [I50.9]    Admission Date: 5/21/2025  Expected Discharge Date: Per Attending    Transition of Care Barriers: None    Payor: MEDICARE / Plan: MEDICARE PART A & B / Product Type: Government /     Extended Emergency Contact Information  Primary Emergency Contact: Marci Cowan   [facility]  Home Phone: [phone]  Mobile Phone: [phone]  Relation: Daughter  Secondary Emergency Contact: christina lopez  Mobile Phone: [phone]  Relation: Grandchild    Discharge Plan A: Home with family         [id number] - [place], [place] - [address] [facility]  [address] [facility] [address]  Phone: [phone] Fax: [phone]      Initial Assessment (most recent)       Adult Discharge Assessment - 05/22/25 1332          Discharge Assessment    Assessment Type Discharge Planning Assessment     Confirmed/corrected address, phone number and insurance Yes     Confirmed Demographics Correct on Facesheet     Source of Information family     Communicated IMTIAZ with patient/caregiver Date not available/Unable to determine     People in Home grandchild(trudi)     Name(s) of People in Home Ada Cohen - granddaughter and Christina weinberg grandson     Facility Arrived From: home     Do you expect to return to your current living situation? Yes     Do you have help at home or someone to help you manage your care at home? Yes     Who are your caregiver(s) and their phone number(s)? Ada Cohen - granddaughter and Christina Lopez - lenard grandson     Prior to hospitilization cognitive status:  Alert/Oriented     Current cognitive status: Alert/Oriented     Walking or Climbing Stairs Difficulty yes     Walking or Climbing Stairs ambulation difficulty, requires equipment     Mobility Management rollator     Dressing/Bathing Difficulty no     Home Accessibility wheelchair accessible     Equipment Currently Used at Home rollator     Readmission within 30 days? No     Patient currently being followed by outpatient case management? No     Do you currently have service(s) that help you manage your care at home? No     Do you take prescription medications? Yes     Do you have prescription coverage? Yes     Do you have any problems affording any of your prescribed medications? No     Is the patient taking medications as prescribed? yes     Who is going to help you get home at discharge? Ada Cohen - granddaughter and Dewayne Awa - lenard grandson     How do you get to doctors appointments? car, drives self;family or friend will provide     Are you on dialysis? No     Do you take coumadin? No     Discharge Plan A Home with family     DME Needed Upon Discharge  none     Discharge Plan discussed with: Caregiver     Name(s) and Number(s) Ada Cohen - ella     Transition of Care Barriers None                   Anticipated DC dispo: home with family  Prior Level of Function: independent with ADLs  People in home: Dewayne pugh     Comments:  SW met with patient's granddaughter to introduce role and discuss discharge planning. Patient's granddaughter and great grandson will be help at home and can provide transport at time of discharge. Confirmed demographics, insurance, and emergency contacts. SW updated Patient's whiteboard with contact information and anticipated DC plan. CM discharge needs depends on hospital progress. SW will continue following to assist with other needs.

## 2025-05-22 NOTE — CONSULTS
Food & Nutrition Education    Diet Education: Cardiac, Fluid restrcition diet    Learners: Patient    Nutrition Education provided with handouts:  Heart Healthy Nutrition Therapy  Fluid Restristed Nutrition Therapy  ([email or web address])    Comments:  PMH: ICD in place, HTN, Elevated troponin  Hx: CHF, Cancer, HLD, Thyroid disease    89 y.o. Female admitted for Acute on chronic diastolic heart failure. Pt is currently on a Low sodium 2 gm, 1500 mL fluid restriction diet. RD visited pt at bedside, pt sitting up in chair. Pt reported having a good appetite of 3 meals/day PTA and also has a good appetite, 75% breakfast. RD provided pt with a menu to help encourage pt preferred food choices. Pt stated her UBW is 138 lbs, noticed weight gain, was 150 lbs; current weight charted 5/22/25 162 lbs (BMI 31.73, Obese).     RD educated patient on low sodium, general healthful diet r/t recent hospital diagnosis. Recommended a well-balanced diet with a variety of fresh foods, fruits and vegetables (5 cups/day), whole grains (3 oz/day), and fat-free or low-fat dairy. Discussed reading food packages, food labels, and nutrition facts labels to identify nutrient content of foods.    Discussed the importance of limiting sodium to less than 2,000 mg per day. Recommended salt free seasonings and other herbs and spices in meals to enhance flavor without additional sodium.    Discussed dietary sources of cholesterol, the importance of incorporating healthy fats into the diet, and avoiding saturated and trans fats for heart health. For a generally healthy diet, aim for total fat less than 25-35% of calories.    Discussed the importance of fiber (especially soluble fiber), dietary sources, and a goal intake of >20-30g/day.    Discussed 1500 ml fluid restriction per MD and dietary sources of fluid. RD recommended using a cup with measurements for fluids and to try to consume small sips spread throughout the day rather than a lot at  "one time.    Pt reported that she has never been on a fluid restriction diet, stated that she "lives" on frozen meals but only eats half for a meal and uses that other half for another meal.   Pt expressed understanding and appreciation for nutrition education provided. Encouraged pt and pt's family members to read handouts and use RD contact information with any further questions/concerns that they may have.     Nutrition Related Social Determinants of Health: SDOH: Adequate food in home environment and None Identified    Visual NFPE performed, pt appears nourished.  All questions and concerns answered.  Provided handout with dietitian's contact information.  *Please re-consult as needed.  Thank you,  Yuliet Yi, BS, RDN, LDN   "

## 2025-05-22 NOTE — PLAN OF CARE
POC reviewed w/ patient. Pt verbalizes understanding of plan  Chart/Orders reviewed  All safety precautions in place; No falls this shift  Pt resting in bed  Pain controlled  Continuous rounding c bed in lowest position, side rails up, call light in reach  Will continue to monitor until the end of shift  Problem: Adult Inpatient Plan of Care  Goal: Plan of Care Review  Outcome: Progressing  Flowsheets (Taken 5/22/2025 0557)  Plan of Care Reviewed With: patient  Goal: Patient-Specific Goal (Individualized)  Outcome: Progressing  Flowsheets (Taken 5/22/2025 0557)  Individualized Care Needs: none  Anxieties, Fears or Concerns: none  Patient/Family-Specific Goals (Include Timeframe): none  Goal: Absence of Hospital-Acquired Illness or Injury  Outcome: Progressing  Goal: Optimal Comfort and Wellbeing  Outcome: Progressing  Goal: Readiness for Transition of Care  Outcome: Progressing     Problem: Infection  Goal: Absence of Infection Signs and Symptoms  Outcome: Progressing     Problem: Wound  Goal: Optimal Coping  Outcome: Progressing  Goal: Optimal Functional Ability  Outcome: Progressing  Goal: Absence of Infection Signs and Symptoms  Outcome: Progressing  Goal: Improved Oral Intake  Outcome: Progressing  Goal: Optimal Pain Control and Function  Outcome: Progressing

## 2025-05-22 NOTE — PT/OT/SLP EVAL
"Physical Therapy Evaluation    Patient Name:  Marysol Yancey   MRN:  [id number]    Recommendations:     Discharge Recommendations: Low Intensity Therapy   Discharge Equipment Recommendations: none   Barriers to discharge: None    Assessment:     Marysol Yancey is a 89 y.o. female admitted with a medical diagnosis of Acute on chronic diastolic heart failure.  She presents with the following impairments/functional limitations: weakness, impaired endurance, impaired self care skills, impaired functional mobility, gait instability, decreased lower extremity function, pain, impaired balance, impaired cardiopulmonary response to activity .    Rehab Prognosis: Good; patient would benefit from acute skilled PT services to address these deficits and reach maximum level of function.    Recent Surgery: * No surgery found *      Plan:     During this hospitalization, patient to be seen 3 x/week to address the identified rehab impairments via gait training, therapeutic activities, therapeutic exercises and progress toward the following goals:    Plan of Care Expires:  06/05/25    Subjective     Chief Complaint: " I WANT TO GET UP AND WALK."  Patient/Family Comments/goals: GO HOME   Pain/Comfort:  Pain Rating 1: 2/10  Location - Side 1: Right  Location 1: calf (SORENESS)  Pain Rating Post-Intervention 1: 2/10    Patients cultural, spiritual, Worship conflicts given the current situation:      Living Environment:   PT LIVES AT HOME WITH GRANDSON WHO WORKS IN A ONE STORY HOME WITH NO STEPS TO ENTER HOME   Prior to admission, patients level of function was CHERIE.  Equipment used at home: walker, rolling, bedside commode, wheelchair.  DME owned (not currently used): none.  Upon discharge, patient will have assistance from GRANDSON AT TIMES.    Objective:     Communicated with EPIC CHART REVIEW  prior to session.  Patient found supine with telemetry, peripheral IV, oxygen, perez catheter  upon PT entry to room.    General Precautions: " "Standard, fall  Orthopedic Precautions:N/A   Braces: N/A  Respiratory Status: Nasal cannula, flow 1 L/min    Exams:  Cognitive Exam:  Patient is oriented to Person, Place, Time, and Situation  Skin Integrity/Edema:      -       Edema: B LE   RLE ROM: WFL  RLE Strength: LIMITED  LLE ROM: WFL  LLE Strength: LIMITED    Functional Mobility:  Bed Mobility:     Rolling Left:  supervision  Scooting: supervision  Supine to Sit: supervision  Transfers:     Sit to Stand:  contact guard assistance with rolling walker  Bed to Chair: contact guard assistance with  rolling walker  using  Stand Pivot  Gait: PT GT TRAINED X 100' WITH CGA>SBA WITH O2 IN TOW.       AM-PAC 6 CLICK MOBILITY  Total Score:18       Treatment & Education:  GT. BELT AND  SOCKS DONNED PRIOR TO OOB MOBILITY.  PT GT TRAINED AND RETURNED TO RM T/F TO CHAIR WITH RW AND CLOSE SBA WITH CUES FOR RW SAFETY.   PT SEATED IN CHAIR FOR B LE TE X 10 REPS OF AP, TKE , AND MIP. PT EDUCATED ON ROLE OF P.T.   PT EDUCATED ON "CALL DON'T FALL", ENCOURAGED TO CALL FOR ASSISTANCE WITH ALL NEEDS FOR OOB MOBILITY.      Patient left up in chair with call button in reach and chair alarm on.    GOALS:   Multidisciplinary Problems       Physical Therapy Goals          Problem: Physical Therapy    Goal Priority Disciplines Outcome Interventions   Physical Therapy Goal     PT, PT/OT     Description: LT25  1. PT WILL COMPLETE BED MOBILITY IND  2. PT WILL STAND T/F TO CHAIR WITH RW MOD I FOR OOB TOLERANCE  3. PT WILL GT TRAIN X 250' WITH RW AND S TO PROGRESS GT.   4. PT WILL INC AMPAC SCORE BY 2 POINTS TO PROGRESS GROSS FUNC MOBILITY.                          DME Justifications:  No DME recommended requiring DME justifications    History:     Past Medical History:   Diagnosis Date    Abnormal ECG 2014    AICD (automatic cardioverter/defibrillator) present 2013    CAD (coronary artery disease) 2013    Cancer     basil cell carcinoma    Cardiac arrest 2013    " CHF (congestive heart failure)     CRI (chronic renal insufficiency) 6/14/2013    Depression 6/14/2013    Diabetes mellitus     Fatigue 6/14/2013    Heart block     Hyperlipidemia     Hypertension     LBBB (left bundle branch block) 12/17/2014    Mitral regurgitation 6/14/2013    Mixed hyperlipidemia 6/14/2013    Pulmonary nodule 6/14/2013    Thyroid disease     TR (tricuspid regurgitation) 6/14/2013    Ventricular fibrillation 6/14/2013       History reviewed. No pertinent surgical history.    Time Tracking:     PT Received On: 05/22/25  PT Start Time: 1000     PT Stop Time: 1025  PT Total Time (min): 25 min     Billable Minutes: Evaluation 15 and Therapeutic Exercise 10      05/22/2025

## 2025-05-22 NOTE — PT/OT/SLP EVAL
Occupational Therapy   Evaluation    Name: Marysol Yancey  MRN: [id number]  Admitting Diagnosis: Acute on chronic diastolic heart failure  Recent Surgery: * No surgery found *      Recommendations:     Discharge Recommendations: Moderate Intensity Therapy (mod vs low intensity)  Discharge Equipment Recommendations:  to be determined by next level of care  Barriers to discharge:       Assessment:     Marysol Yancey is a 89 y.o. female with a medical diagnosis of Acute on chronic diastolic heart failure.  She presents with the following with the deficits affecting function: weakness, impaired self care skills, impaired balance, decreased safety awareness, impaired endurance, impaired functional mobility, decreased upper extremity function, gait instability, decreased lower extremity function.      Rehab Prognosis: Good; patient would benefit from acute skilled OT services to address these deficits and reach maximum level of function.       Plan:     Patient to be seen 2 x/week to address the above listed problems via self-care/home management, therapeutic activities, therapeutic exercises  Plan of Care Expires: 06/05/25  Plan of Care Reviewed with: patient    Subjective     Chief Complaint: debility and generalized weakness  Patient/Family Comments/goals: get  stronger    Occupational Profile:  Living Environment:  lives with grandson who works in 1 story house and no steps to enter  Previous level of function: mod (I) with adl's and functional mobility  Roles and Routines: get stronger  Equipment Used at Home: walker, rolling, bedside commode, wheelchair  Assistance upon Discharge: TBD    Pain/Comfort:  Pain Rating 1: 0/10    Patients cultural, spiritual, [employer]ian conflicts given the current situation:      Objective:     Communicated with: nurse and epic chart review prior to session.  Patient found HOB elevated with peripheral IV, oxygen (1 liter) upon OT entry to room.    General Precautions: Standard,  fall  Orthopedic Precautions: N/A  Braces: N/A  Respiratory Status: Nasal cannula, flow 1 L/min    Occupational Performance:    Bed Mobility:    Patient completed Rolling/Turning to Right with stand by assistance  Patient completed Scooting/Bridging with contact guard assistance  Patient completed Supine to Sit with stand by assistance    Functional Mobility/Transfers:  Patient completed Sit <> Stand Transfer with contact guard assistance  with  rolling walker   Patient completed Bed <> Chair Transfer using Step Transfer technique with contact guard assistance with rolling walker  Functional Mobility: py ambulated 120 feet with cga    Activities of Daily Living:  Upper Body Dressing: minimum assistance .  Lower Body Dressing: contact guard assistance armando/doff socks seated    Cognitive/Visual Perceptual:  Cognitive/Psychosocial Skills:     -       Oriented to: Person, Place, Time, and Situation   -       Follows Commands/attention:Follows multistep  commands  -       Communication: clear/fluent  -       Memory: No Deficits noted  -       Safety awareness/insight to disability: impaired     Physical Exam:  Upper Extremity Range of Motion:     -       Right Upper Extremity: WFL  -       Left Upper Extremity: aarom wfl  Upper Extremity Strength:    -       Right Upper Extremity: mmt: 3/5 grossly  -       Left Upper Extremity: mmt: 2/5 grossly   Strength:    -       Right Upper Extremity: mmt : 3/5 grossly  -       Left Upper Extremity: mmt: 3/5 grossly    AMPAC 6 Click ADL:  AMPAC Total Score: 15    Treatment & Education:  Educated patient on importance of increased tolerance to upright position and direct impact on CV endurance and strength. Patient encouraged to sit up in chair, for a minimum of 2 consecutive hours including for all meals. Pt educated with HEP. Pt performed 1 set x 10 reps b ue rom exercise (shoulder flexion, elbow flex/ext and hand / digits flex/ext). Encouraged patient to perform AROM TE to BUE  throughout the day within all available planes of motion. Re enforced importance of utilizing call light to meet needs in room and not attempt to get up without staff assistance. Patient verbalized understanding and agreed to comply.           Patient left up in chair with all lines intact, call button in reach, chair alarm on, and nurse notified    GOALS:   Multidisciplinary Problems       Occupational Therapy Goals          Problem: Occupational Therapy    Goal Priority Disciplines Outcome Interventions   Occupational Therapy Goal     OT, PT/OT     Description: O.T. goals to be met by 6-5-25  Pt will tolerate 1 set x 15 reps b ue rom exercise  S with le dressing  S with ue dressing  S with toilet transfer                       DME Justifications:   Marysol GARBER's mobility limitation cannot be sufficiently resolved by the use of a cane. Her functional mobility deficit can be sufficiently resolved with the use of a Rolling Walker. Patient's mobility limitation significantly impairs their ability to participate in one of more activities of daily living.  The use of a RW will significantly improve the patient's ability to participate in MRADLS and the patient will use it on regular basis in the home.    History:     Past Medical History:   Diagnosis Date    Abnormal ECG 12/17/2014    AICD (automatic cardioverter/defibrillator) present 6/14/2013    CAD (coronary artery disease) 6/14/2013    Cancer     basil cell carcinoma    Cardiac arrest 6/14/2013    CHF (congestive heart failure)     CRI (chronic renal insufficiency) 6/14/2013    Depression 6/14/2013    Diabetes mellitus     Fatigue 6/14/2013    Heart block     Hyperlipidemia     Hypertension     LBBB (left bundle branch block) 12/17/2014    Mitral regurgitation 6/14/2013    Mixed hyperlipidemia 6/14/2013    Pulmonary nodule 6/14/2013    Thyroid disease     TR (tricuspid regurgitation) 6/14/2013    Ventricular fibrillation 6/14/2013       History reviewed. No pertinent  surgical history.    Time Tracking:     OT Date of Treatment: 05/22/25  OT Start Time: 0940  OT Stop Time: 1005  OT Total Time (min): 25 min    Billable Minutes:Evaluation 15 minutes  Therapeutic Activity 10 minutes    5/22/2025

## 2025-05-22 NOTE — ASSESSMENT & PLAN NOTE
-Initial troponin 0.076, elevation likely due to demand ischemia from elevated BP/decompensated CHF/renal failure  -No CP  -ASA if tolerated  -Continue BB  -Prior allergy to statin/BB  -TTE pending    5/22/25  -troponin trending down  -No CP   - ASA if tolerated   -Continue BB

## 2025-05-23 VITALS
RESPIRATION RATE: 16 BRPM | HEART RATE: 70 BPM | OXYGEN SATURATION: 98 % | HEIGHT: 60 IN | TEMPERATURE: 99 F | SYSTOLIC BLOOD PRESSURE: 146 MMHG | WEIGHT: 162.5 LBS | DIASTOLIC BLOOD PRESSURE: 84 MMHG | BODY MASS INDEX: 31.9 KG/M2

## 2025-05-23 LAB
ABSOLUTE EOSINOPHIL (OHS): 0.51 K/UL
ABSOLUTE MONOCYTE (OHS): 0.66 K/UL (ref 0.3–1)
ABSOLUTE NEUTROPHIL COUNT (OHS): 3.69 K/UL (ref 1.8–7.7)
ALBUMIN SERPL BCP-MCNC: 3 G/DL (ref 3.5–5.2)
ALP SERPL-CCNC: 62 UNIT/L (ref 40–150)
ALT SERPL W/O P-5'-P-CCNC: 13 UNIT/L (ref 10–44)
ANION GAP (OHS): 9 MMOL/L (ref 8–16)
AST SERPL-CCNC: 18 UNIT/L (ref 11–45)
BASOPHILS # BLD AUTO: 0.11 K/UL
BASOPHILS NFR BLD AUTO: 1.6 %
BILIRUB SERPL-MCNC: 0.4 MG/DL (ref 0.1–1)
BUN SERPL-MCNC: 33 MG/DL (ref 10–30)
CALCIUM SERPL-MCNC: 8.8 MG/DL (ref 8.7–10.5)
CHLORIDE SERPL-SCNC: 105 MMOL/L (ref 95–110)
CO2 SERPL-SCNC: 26 MMOL/L (ref 23–29)
CREAT SERPL-MCNC: 1.9 MG/DL (ref 0.5–1.4)
ERYTHROCYTE [DISTWIDTH] IN BLOOD BY AUTOMATED COUNT: 13.7 % (ref 11.5–14.5)
GFR SERPLBLD CREATININE-BSD FMLA CKD-EPI: 25 ML/MIN/1.73/M2
GLUCOSE SERPL-MCNC: 97 MG/DL (ref 70–110)
HCT VFR BLD AUTO: 39.9 % (ref 37–48.5)
HGB BLD-MCNC: 12.3 GM/DL (ref 12–16)
IMM GRANULOCYTES # BLD AUTO: 0.01 K/UL (ref 0–0.04)
IMM GRANULOCYTES NFR BLD AUTO: 0.1 % (ref 0–0.5)
LYMPHOCYTES # BLD AUTO: 1.69 K/UL (ref 1–4.8)
MAGNESIUM SERPL-MCNC: 2.2 MG/DL (ref 1.6–2.6)
MCH RBC QN AUTO: 30.4 PG (ref 27–31)
MCHC RBC AUTO-ENTMCNC: 30.8 G/DL (ref 32–36)
MCV RBC AUTO: 99 FL (ref 82–98)
NUCLEATED RBC (/100WBC) (OHS): 0 /100 WBC
PHOSPHATE SERPL-MCNC: 3.7 MG/DL (ref 2.7–4.5)
PLATELET # BLD AUTO: 241 K/UL (ref 150–450)
PMV BLD AUTO: 10.1 FL (ref 9.2–12.9)
POTASSIUM SERPL-SCNC: 4 MMOL/L (ref 3.5–5.1)
PROT SERPL-MCNC: 6.1 GM/DL (ref 6–8.4)
RBC # BLD AUTO: 4.05 M/UL (ref 4–5.4)
RELATIVE EOSINOPHIL (OHS): 7.6 %
RELATIVE LYMPHOCYTE (OHS): 25.3 % (ref 18–48)
RELATIVE MONOCYTE (OHS): 9.9 % (ref 4–15)
RELATIVE NEUTROPHIL (OHS): 55.5 % (ref 38–73)
SODIUM SERPL-SCNC: 140 MMOL/L (ref 136–145)
WBC # BLD AUTO: 6.67 K/UL (ref 3.9–12.7)

## 2025-05-23 PROCEDURE — 97530 THERAPEUTIC ACTIVITIES: CPT

## 2025-05-23 PROCEDURE — 63600175 PHARM REV CODE 636 W HCPCS: Performed by: NURSE PRACTITIONER

## 2025-05-23 PROCEDURE — 36415 COLL VENOUS BLD VENIPUNCTURE: CPT | Performed by: STUDENT IN AN ORGANIZED HEALTH CARE EDUCATION/TRAINING PROGRAM

## 2025-05-23 PROCEDURE — 84100 ASSAY OF PHOSPHORUS: CPT | Performed by: STUDENT IN AN ORGANIZED HEALTH CARE EDUCATION/TRAINING PROGRAM

## 2025-05-23 PROCEDURE — 85025 COMPLETE CBC W/AUTO DIFF WBC: CPT | Performed by: STUDENT IN AN ORGANIZED HEALTH CARE EDUCATION/TRAINING PROGRAM

## 2025-05-23 PROCEDURE — 25000003 PHARM REV CODE 250: Performed by: NURSE PRACTITIONER

## 2025-05-23 PROCEDURE — 97116 GAIT TRAINING THERAPY: CPT

## 2025-05-23 PROCEDURE — 94618 PULMONARY STRESS TESTING: CPT

## 2025-05-23 PROCEDURE — 80053 COMPREHEN METABOLIC PANEL: CPT | Performed by: STUDENT IN AN ORGANIZED HEALTH CARE EDUCATION/TRAINING PROGRAM

## 2025-05-23 PROCEDURE — 83735 ASSAY OF MAGNESIUM: CPT | Performed by: STUDENT IN AN ORGANIZED HEALTH CARE EDUCATION/TRAINING PROGRAM

## 2025-05-23 PROCEDURE — 27000221 HC OXYGEN, UP TO 24 HOURS

## 2025-05-23 PROCEDURE — 94761 N-INVAS EAR/PLS OXIMETRY MLT: CPT

## 2025-05-23 PROCEDURE — 99900035 HC TECH TIME PER 15 MIN (STAT)

## 2025-05-23 PROCEDURE — 97110 THERAPEUTIC EXERCISES: CPT

## 2025-05-23 PROCEDURE — 99232 SBSQ HOSP IP/OBS MODERATE 35: CPT | Mod: ,,, | Performed by: PHYSICIAN ASSISTANT

## 2025-05-23 RX ORDER — FUROSEMIDE 20 MG/1
20 TABLET ORAL DAILY
Qty: 30 TABLET | Refills: 0 | Status: SHIPPED | OUTPATIENT
Start: 2025-05-23

## 2025-05-23 RX ORDER — CARVEDILOL 6.25 MG/1
6.25 TABLET ORAL 2 TIMES DAILY WITH MEALS
Start: 2025-05-23

## 2025-05-23 RX ORDER — CLONIDINE HYDROCHLORIDE 0.2 MG/1
0.2 TABLET ORAL 2 TIMES DAILY
Qty: 60 TABLET | Refills: 0 | Status: SHIPPED | OUTPATIENT
Start: 2025-05-23

## 2025-05-23 RX ORDER — ASPIRIN 81 MG/1
81 TABLET ORAL DAILY
Qty: 30 TABLET | Refills: 0 | Status: SHIPPED | OUTPATIENT
Start: 2025-05-23

## 2025-05-23 RX ADMIN — CARVEDILOL 6.25 MG: 6.25 TABLET, FILM COATED ORAL at 08:05

## 2025-05-23 RX ADMIN — CLONIDINE HYDROCHLORIDE 0.2 MG: 0.2 TABLET ORAL at 08:05

## 2025-05-23 RX ADMIN — FUROSEMIDE 40 MG: 10 INJECTION, SOLUTION INTRAMUSCULAR; INTRAVENOUS at 08:05

## 2025-05-23 RX ADMIN — MUPIROCIN: 20 OINTMENT TOPICAL at 08:05

## 2025-05-23 NOTE — PLAN OF CARE
Problem: Skin Injury Risk Increased  Goal: Skin Health and Integrity  Outcome: Ongoing     Problem: Adult Inpatient Plan of Care  Goal: Optimal Comfort and Wellbeing  Outcome: Ongoing     Problem: Wound  Goal: Skin Health and Integrity  Outcome: Ongoing

## 2025-05-23 NOTE — SUBJECTIVE & OBJECTIVE
Interval History: No acute events overnight, afebrile, hemodynamically stable.  Reports some improvement in shortness of breath.    Objective:     Vital Signs (Most Recent):  Temp: 98.6 °F (37 °C) (05/22/25 1644)  Pulse: 86 (05/22/25 1644)  Resp: 18 (05/22/25 1644)  BP: (!) 146/85 (05/22/25 1644)  SpO2: 97 % (05/22/25 1644) Vital Signs (24h Range):  Temp:  [97.7 °F (36.5 °C)-98.8 °F (37.1 °C)] 98.6 °F (37 °C)  Pulse:  [] 86  Resp:  [18-20] 18  SpO2:  [92 %-97 %] 97 %  BP: (116-146)/(60-85) 146/85     Weight: 73.7 kg (162 lb 7.7 oz)  Body mass index is 31.73 kg/m².    Intake/Output Summary (Last 24 hours) at 5/22/2025 1931  Last data filed at 5/22/2025 1731  Gross per 24 hour   Intake 720 ml   Output 1400 ml   Net -680 ml         Physical Exam  Vitals and nursing note reviewed.   Constitutional:       General: She is not in acute distress.     Appearance: She is ill-appearing. She is not toxic-appearing or diaphoretic.   HENT:      Head: Normocephalic and atraumatic.      Mouth/Throat:      Mouth: Mucous membranes are moist.   Eyes:      General: No scleral icterus.        Right eye: No discharge.         Left eye: No discharge.   Cardiovascular:      Rate and Rhythm: Normal rate and regular rhythm.      Heart sounds: Normal heart sounds.   Pulmonary:      Effort: Pulmonary effort is normal. No respiratory distress.      Breath sounds: Normal breath sounds.   Abdominal:      General: Bowel sounds are normal. There is no distension.      Palpations: Abdomen is soft.      Tenderness: There is no abdominal tenderness. There is no guarding.      Hernia: No hernia is present.   Musculoskeletal:      Cervical back: No rigidity.      Right lower leg: No edema.      Left lower leg: No edema.   Skin:     General: Skin is warm and dry.      Coloration: Skin is not jaundiced.   Neurological:      Mental Status: She is alert and oriented to person, place, and time. Mental status is at baseline.   Psychiatric:         Mood  and Affect: Mood normal.         Behavior: Behavior normal.             Significant Labs: All pertinent labs within the past 24 hours have been reviewed.   Recent Labs   Lab 05/21/25  0837 05/22/25 0458    141   K 3.9 3.2*    107   CO2 19* 25   BUN 29 29   CREATININE 1.9* 1.8*    95   ANIONGAP 14 9     Recent Labs   Lab 05/22/25 0458   MG 2.2   PHOS 3.5     Recent Labs   Lab 05/21/25  0837 05/22/25 0458   AST 22 18   ALT 19 13   ALKPHOS 82 67   BILITOT 0.3 0.4   ALBUMIN 3.9 3.0*    Recent Labs   Lab 05/21/25  0837 05/22/25 0458   WBC 8.69 6.58   HGB 15.1 12.9   HCT 47.2 40.8    225            Significant Imaging:  I have reviewed all pertinent imaging results/findings within the past 24 hours.   X-Ray Chest AP Portable   Final Result      As above         Electronically signed by: River Baird MD   Date:    05/21/2025   Time:    09:21          Inpatient Medications:  Scheduled Meds:   carvediloL  6.25 mg Oral BID WM    cloNIDine  0.2 mg Oral BID    enoxparin  30 mg Subcutaneous Daily    furosemide (LASIX) injection  40 mg Intravenous Daily    mupirocin   Nasal BID    senna-docusate  1 tablet Oral BID       PRN Meds:  Current Facility-Administered Medications:     gabapentin, 100 mg, Oral, Nightly PRN    hydrALAZINE, 5 mg, Intravenous, Q4H PRN    melatonin, 6 mg, Oral, Nightly PRN    ondansetron, 4 mg, Intravenous, Q6H PRN    polyethylene glycol, 17 g, Oral, BID PRN    prochlorperazine, 2.5 mg, Intravenous, Q8H PRN    sodium chloride 0.9%, 10 mL, Intravenous, PRN

## 2025-05-23 NOTE — PT/OT/SLP PROGRESS
"Occupational Therapy   Treatment    Name: Marysol Yancey  MRN: [id number]  Admitting Diagnosis:  Acute on chronic diastolic heart failure       Recommendations:     Discharge Recommendations: Low Intensity Therapy  Discharge Equipment Recommendations:  walker, rolling  Barriers to discharge:  None    Assessment:     Marysol Yancey is a 89 y.o. female with a medical diagnosis of Acute on chronic diastolic heart failure.  She presents with the following performance deficits affecting function are weakness, impaired endurance, impaired balance, decreased coordination, decreased lower extremity function, decreased safety awareness, decreased ROM, impaired cognition.     Rehab Prognosis:  Good; patient would benefit from acute skilled OT services to address these deficits and reach maximum level of function.       Plan:     Patient to be seen 2 x/week to address the above listed problems via self-care/home management, therapeutic activities, therapeutic exercises  Plan of Care Expires: 06/05/25  Plan of Care Reviewed with: patient    Subjective     Chief Complaint: none stated  Patient/Family Comments/goals: "Feel better"  Pain/Comfort:  Pain Rating 1: 0/10    Objective:     Communicated with: Nurse and EPIC chart review prior to session.  Patient found with bed in chair position with telemetry, peripheral IV, oxygen, Other (comments) (AVASYS) upon OT entry to room.    General Precautions: Standard, fall    Orthopedic Precautions:N/A  Braces: N/A  Respiratory Status: Room air     Occupational Performance:     Bed Mobility:    Pt refused    Functional Mobility/Transfers:  Pt refused    Activities of Daily Living:  Setup for washing face and shower cap for washing hair      AMPA 6 Click ADL: 15    Treatment & Education:  Pt requested not to walk again or BM. Pt educated on differences between OT and PT. Pt given opportunity for ADLs but requested to do independently after setup. Pt left with supplies such as shower cap and " towels. Educated patient on importance of increased tolerance to upright position and direct impact on CV endurance and strength. Patient encouraged to sit up in chair/ EOB, for a minimum of 2 consecutive hours, 3x per day. Encouraged patient to perform AROM TE to BLE & BUE throughout the day within all available planes of motion. Re enforced importance of utilizing call light to meet needs in room and not attempt to get up without staff assistance. Patient verbalized understanding and agreed to comply.      Patient left with bed in chair position with all lines intact, call button in reach, and bed alarm on    GOALS:   Multidisciplinary Problems       Occupational Therapy Goals          Problem: Occupational Therapy    Goal Priority Disciplines Outcome Interventions   Occupational Therapy Goal     OT, PT/OT Progressing    Description: O.T. goals to be met by 6-5-25  Pt will tolerate 1 set x 15 reps b ue rom exercise  S with le dressing  S with ue dressing  S with toilet transfer                           Time Tracking:     OT Date of Treatment: 05/23/25  OT Start Time: 1300  OT Stop Time: 1315  OT Total Time (min): 15 min    Billable Minutes:Therapeutic Activity 15    OT/MORGAN: MORGAN     Number of MORGAN visits since last OT visit: 1  A client care conference was performed between the ELIAS and HARVEY, prior to treatment by HARVEY, to discuss the patient's status, treatment plan and established goals.  HARVEY Alejandro    5/23/2025

## 2025-05-23 NOTE — RESPIRATORY THERAPY
Home Oxygen Evaluation - [facility] - Cardiopulmonary Department      Date Performed: 5/23/2025      1) Patient's Home O2 Sat on room air, while at rest: Room Air SpO2 At Rest: 95 %        If O2 sats on room air at rest are 88% or below, patient qualifies.  Document O2 liter flow needed in Step 2.  If O2 sats are 89% or above, complete Step 3.        2)  If patient is not ambulated and O2 sats are <88%, what is the O2 liter flow required to meet ordered saturation?     Home O2 Eval Comments: patient does qualify    If O2 sats on room air while exercising remain 89% or above patient does not qualify, no further testing needed Document N/A in step 3. If O2 sats on room air while exercising are 88% or below, continue to Step 4.    3) Patient's O2 Sat on room air while exercising: Room Air SpO2 During Ambulation: (!) 87 %        4) Patient's O2 Sat while exercising on O2: SpO2 During Ambulation on O2: 93 % at Ambulation O2 LPM: 2 LPM         (Must show improvement from #4 for patients to qualify)

## 2025-05-23 NOTE — ASSESSMENT & PLAN NOTE
Creatine stable for now. BMP reviewed- noted Estimated Creatinine Clearance: 18.3 mL/min (A) (based on SCr of 1.8 mg/dL (H)). according to latest data. Based on current GFR, CKD stage is stage 4 - GFR 15-29.  Monitor UOP and serial BMP and adjust therapy as needed. Renally dose meds. Avoid nephrotoxic medications and procedures.

## 2025-05-23 NOTE — PLAN OF CARE
05/23/25 1437   Post-Acute Status   Post-Acute Authorization Home Health;HME   HME Status Referrals Sent   Home Health Status Referrals Sent   Discharge Delays None known at this time   Discharge Plan   Discharge Plan A Home Health       SW meet with Patient at bedside regarding DC home today. Patient was agreeable and wished for [facility], as she has used them in the past.  SW verbalized understanding and sent referral to [facility].     Patient also qualifies for 2L O2 upon DC. SW arranging [employer], for home oxygen.   Per Shobha, Patient may require a larger tank for DC and wished to know when Patient was leaving. SW contacted Patient's grandson, Dewayne, who stated he would be at bedside about 3:30 - 4:00. SW will notify Shobha, with [employer] and assist accordingly.    14 37 Per Shobha, [employer] will try to get Patient larger O2 tank to DC home, however if Patient's ride gets here prior to the oxygen, Patient can use one of the smaller tanks upon DC home.   SW verbalized understanding and will assist accordingly.     SW will continue to follow and assist as needed.

## 2025-05-23 NOTE — ASSESSMENT & PLAN NOTE
Recent Labs     05/21/25  0837 05/22/25  0841   TROPONINI 0.076* 0.061*       -Appears to have chronically elevated troponin  -She currently denies chest pain and/or equivalent  -Cardiology evaluation noted elevated troponin likely secondary to demand ischemia from decompensated heart failure, hypertension, renal failure

## 2025-05-23 NOTE — ASSESSMENT & PLAN NOTE
Patient's blood pressure range in the last 24 hours was: BP  Min: 116/60  Max: 146/85.The patient's inpatient anti-hypertensive regimen is listed below:  Current Antihypertensives  furosemide injection 40 mg, Daily, Intravenous  hydrALAZINE injection 5 mg, Every 4 hours PRN, Intravenous  carvediloL tablet 6.25 mg, 2 times daily with meals, Oral  cloNIDine tablet 0.2 mg, 2 times daily, Oral    Plan  - BP is controlled, no changes needed to their regimen  - Hydralazine PRN

## 2025-05-23 NOTE — HOSPITAL COURSE
Admitted to Hospital Medicine for evaluation of CHF exacerbation concerns.  Started on diuresis with furosemide IV. Cardiology consulted.  Suspect troponin elevation likely secondary to demand ischemia from combination of hypertension, renal failure, CHF exacerbation. TTE completed.  Patient continued to show improvement in respiratory symptoms throughout hospital course.  Patient underwent voiding trial overnight on May 22nd.  Patient passed voiding trial and multiple bladder scans throughout remainder of hospital course did not note any concerns for urinary retention.  Renal function continued to remain stable throughout hospital course, suspect baseline given history of CKD.  On May 23rd, cardiology evaluation noted clinical improvement.  Recommendations for continued close outpatient follow up at [facility] Clinic.  Discussed with Cardiology and de-escalated furosemide IV to furosemide 20 mg daily and patient was extensively counseled on importance of following up with close outpatient cardiology appointment that was already set up.  Respiratory therapy evaluated the patient and noted home supplemental oxygen requirement of 2LPM.        Discharge plan of care was discussed at length with patient/family including patient's need for close outpatient follow-up.  Instructed on attending upcoming follow up with Cardiology for further evaluation. Instructed on PCP follow up within 1 week with repeat lab work to ensure normalization, follow up of pending labs, or any further evaluation as necessitated. Counseled on frequent home BP monitoring while resuming home BP meds, patient reports having a BP cuff at home.  Counseled on indication for aspirin per cardiology recs, patient already reports taking aspirin daily. All questions and concerns were answered. Return precautions provided.  Patient instructed to return to the hospital or seek medical care if baseline status should suddenly change, return of symptoms occurs, or for  any new or concerning symptoms that arise.  Patient was in agreement with plan of care going forward. Patient continued to remain afebrile, hemodynamically stable with supplemental oxygen, able to tolerate diet. RISHABH/TRI set up home health with repeat labs ordered.  Patient seen and examined on the day of discharge. Patient deemed stable for discharge.

## 2025-05-23 NOTE — ASSESSMENT & PLAN NOTE
MAT is likely due to volume overload. Baseline creatinine is 1.3. Most recent creatinine and eGFR are listed below.  Recent Labs     05/21/25  0837 05/22/25  0458   CREATININE 1.9* 1.8*   EGFRNORACEVR 25* 26*      Plan  - MAT is improving  - Avoid nephrotoxins and renally dose meds for GFR listed above  - Monitor urine output, serial BMP, and adjust therapy as needed

## 2025-05-23 NOTE — PROGRESS NOTES
"[facility] - Telemetry ([facility] Medicine  Progress Note    Patient Name: Marysol Yancey  MRN: [id number]  Patient Class: IP- Inpatient   Admission Date: 5/21/2025  Length of Stay: 1 days  Attending Physician: Bret De La Cruz DO  Primary Care Provider: Bruno Nugent MD        Subjective     Principal Problem:Acute on chronic diastolic heart failure        HPI:  Marysol Yancey is a 89 year old female who  has a past medical history of Abnormal ECG, AICD (automatic cardioverter/defibrillator) present, CAD (coronary artery disease), Cancer, Cardiac arrest, CHF (congestive heart failure), CRI (chronic renal insufficiency), Depression, Diabetes mellitus, Fatigue, Heart block, Hyperlipidemia, Hypertension, LBBB (left bundle branch block), Mitral regurgitation, Mixed hyperlipidemia, Pulmonary nodule, Thyroid disease, TR (tricuspid regurgitation), and Ventricular fibrillation who presented to the Emergency Department for evaluation of SOB. Associated symptoms include: BLE swellling. Onset "weeks ago" but progressively became worse. She reports she is now SOB with exertion and at rest. Hx of CHF. Last 2D ECHO in 02/2022 showed EF 55% with indeterminate left ventricular diastolic function. She reportedly only takes 3 medications (clonidine, levothyroxine, and PRN med), furosemide is not one. She has been sleeping in her recliner due to current symptoms.     In ED WBC count 8.69K, Hgb/Hct 15.1/47.2, creatinine 1.9 (1.3 in 09/2023), , troponin 0.076. UA negative for infectious process. CXR showed background of chronic interstitial lung disease. Acute on chronic interstitial edema or pneumonia not entirely excluded. The lungs demonstrate no evidence of consolidation. No evidence of pleural effusion or pneumothorax. Hypoxic on scene, [facility] reported O2 sat of 91% on RA so they placed her on 2L of O2 nasal cannula. Prior Tx 2 Nitro SL en route with [facility]. She received in the ED lasix 40 mg IV. Following a " comprehensive evaluation of the patient's clinical presentation, vital signs, laboratory results, and risk factors, myself with the attending made  the active decision to admit the patient for further monitoring, diagnostic work-up, and initiation of appropriate treatment, given the potential for clinical deterioration if managed in an outpatient setting. Pt admitted to  under observation status for acute on chronic diastolic heart failure.     Overview/Hospital Course:  Admitted to Hospital Medicine for evaluation of CHF exacerbation concerns.  Started on diuresis with furosemide IV. Cardiology consulted,.  Suspect troponin elevation likely secondary to demand ischemia from combination of hypertension, renal failure, CHF exacerbation. TTE completed.    Interval History: No acute events overnight, afebrile, hemodynamically stable.  Reports some improvement in shortness of breath.    Objective:     Vital Signs (Most Recent):  Temp: 98.6 °F (37 °C) (05/22/25 1644)  Pulse: 86 (05/22/25 1644)  Resp: 18 (05/22/25 1644)  BP: (!) 146/85 (05/22/25 1644)  SpO2: 97 % (05/22/25 1644) Vital Signs (24h Range):  Temp:  [97.7 °F (36.5 °C)-98.8 °F (37.1 °C)] 98.6 °F (37 °C)  Pulse:  [] 86  Resp:  [18-20] 18  SpO2:  [92 %-97 %] 97 %  BP: (116-146)/(60-85) 146/85     Weight: 73.7 kg (162 lb 7.7 oz)  Body mass index is 31.73 kg/m².    Intake/Output Summary (Last 24 hours) at 5/22/2025 1931  Last data filed at 5/22/2025 1731  Gross per 24 hour   Intake 720 ml   Output 1400 ml   Net -680 ml         Physical Exam  Vitals and nursing note reviewed.   Constitutional:       General: She is not in acute distress.     Appearance: She is ill-appearing. She is not toxic-appearing or diaphoretic.   HENT:      Head: Normocephalic and atraumatic.      Mouth/Throat:      Mouth: Mucous membranes are moist.   Eyes:      General: No scleral icterus.        Right eye: No discharge.         Left eye: No discharge.   Cardiovascular:      Rate and  Rhythm: Normal rate and regular rhythm.      Heart sounds: Normal heart sounds.   Pulmonary:      Effort: Pulmonary effort is normal. No respiratory distress.      Breath sounds: Normal breath sounds.   Abdominal:      General: Bowel sounds are normal. There is no distension.      Palpations: Abdomen is soft.      Tenderness: There is no abdominal tenderness. There is no guarding.      Hernia: No hernia is present.   Musculoskeletal:      Cervical back: No rigidity.      Right lower leg: No edema.      Left lower leg: No edema.   Skin:     General: Skin is warm and dry.      Coloration: Skin is not jaundiced.   Neurological:      Mental Status: She is alert and oriented to person, place, and time. Mental status is at baseline.   Psychiatric:         Mood and Affect: Mood normal.         Behavior: Behavior normal.             Significant Labs: All pertinent labs within the past 24 hours have been reviewed.   Recent Labs   Lab 05/21/25 0837 05/22/25 0458    141   K 3.9 3.2*    107   CO2 19* 25   BUN 29 29   CREATININE 1.9* 1.8*    95   ANIONGAP 14 9     Recent Labs   Lab 05/22/25 0458   MG 2.2   PHOS 3.5     Recent Labs   Lab 05/21/25 0837 05/22/25 0458   AST 22 18   ALT 19 13   ALKPHOS 82 67   BILITOT 0.3 0.4   ALBUMIN 3.9 3.0*    Recent Labs   Lab 05/21/25 0837 05/22/25 0458   WBC 8.69 6.58   HGB 15.1 12.9   HCT 47.2 40.8    225            Significant Imaging:  I have reviewed all pertinent imaging results/findings within the past 24 hours.   X-Ray Chest AP Portable   Final Result      As above         Electronically signed by: River Baird MD   Date:    05/21/2025   Time:    09:21          Inpatient Medications:  Scheduled Meds:   carvediloL  6.25 mg Oral BID WM    cloNIDine  0.2 mg Oral BID    enoxparin  30 mg Subcutaneous Daily    furosemide (LASIX) injection  40 mg Intravenous Daily    mupirocin   Nasal BID    senna-docusate  1 tablet Oral BID       PRN Meds:  Current  Facility-Administered Medications:     gabapentin, 100 mg, Oral, Nightly PRN    hydrALAZINE, 5 mg, Intravenous, Q4H PRN    melatonin, 6 mg, Oral, Nightly PRN    ondansetron, 4 mg, Intravenous, Q6H PRN    polyethylene glycol, 17 g, Oral, BID PRN    prochlorperazine, 2.5 mg, Intravenous, Q8H PRN    sodium chloride 0.9%, 10 mL, Intravenous, PRN            Assessment & Plan  Acute on chronic diastolic heart failure  Patient has Diastolic (HFpEF) heart failure that is Acute on chronic. On presentation their CHF was decompensated. Evidence of decompensated CHF on presentation includes: edema, orthopnea, dyspnea on exertion (ABURTO), and shortness of breath. The etiology of their decompensation is likely dietary indiscretion and medication non-compliance. Most recent BNP and echo results are listed below.  Recent Labs     05/21/25  0837   *     Latest ECHO  Results for orders placed during the hospital encounter of 08/19/22    Echo Saline Bubble? No    Interpretation Summary  · The left ventricle is normal in size with concentric hypertrophy and normal systolic function.  · The estimated PA systolic pressure is 25 mmHg.  · The estimated ejection fraction is 55%.  · Indeterminate left ventricular diastolic function.  · Normal right ventricular size with normal right ventricular systolic function.  · Normal central venous pressure (3 mmHg).  · Mild mitral regurgitation.  · There is abnormal septal wall motion consistent with right ventricular pacemaker.    Current Heart Failure Medications  furosemide injection 40 mg, Daily, Intravenous  hydrALAZINE injection 5 mg, Every 4 hours PRN, Intravenous  carvediloL tablet 6.25 mg, 2 times daily with meals, Oral    Plan  - Monitor strict I&Os and daily weights.    - Place on telemetry  - Low sodium diet  - Place on fluid restriction of 1.5 L.   - Cardiology has been consulted  - The patient's volume status is stable but not at their baseline as indicated by edema, orthopnea,  dyspnea on exertion (ABURTO), and shortness of breath         Hypertension  Patient's blood pressure range in the last 24 hours was: BP  Min: 116/60  Max: 146/85.The patient's inpatient anti-hypertensive regimen is listed below:  Current Antihypertensives  furosemide injection 40 mg, Daily, Intravenous  hydrALAZINE injection 5 mg, Every 4 hours PRN, Intravenous  carvediloL tablet 6.25 mg, 2 times daily with meals, Oral  cloNIDine tablet 0.2 mg, 2 times daily, Oral    Plan  - BP is controlled, no changes needed to their regimen  - Hydralazine PRN  Elevated troponin  Recent Labs     05/21/25  0837 05/22/25  0841   TROPONINI 0.076* 0.061*       -Appears to have chronically elevated troponin  -She currently denies chest pain and/or equivalent  -Cardiology evaluation noted elevated troponin likely secondary to demand ischemia from decompensated heart failure, hypertension, renal failure    ICD (implantable cardioverter-defibrillator) in place  History of cardiac arrest  History of ventricular fibrillation  Due to history of V fib arrest/torsades in 2013    PLAN:  Continue telemetry          Acute kidney injury superimposed on chronic kidney disease  CRI (chronic renal insufficiency)  MAT is likely due to volume overload. Baseline creatinine is 1.3. Most recent creatinine and eGFR are listed below.  Recent Labs     05/21/25  0837 05/22/25  0458   CREATININE 1.9* 1.8*   EGFRNORACEVR 25* 26*      Plan  - MAT is improving  - Avoid nephrotoxins and renally dose meds for GFR listed above  - Monitor urine output, serial BMP, and adjust therapy as needed      VTE Risk Mitigation (From admission, onward)           Ordered     enoxaparin injection 30 mg  Daily         05/21/25 1034     IP VTE HIGH RISK PATIENT  Once         05/21/25 1031     Place sequential compression device  Until discontinued         05/21/25 1031                    Discharge Planning   IMTIAZ: 5/23/2025     Code Status: DNR   Medical Readiness for Discharge Date:    Discharge Plan A: Home with family              Bret Karthikeyan De La Cruz DO  Department of Hospital Medicine   [facility] - Telemetry ([facility])    Voice recognition software was used in the creation of this note/communication and any sound-alike/typographical errors which may have occurred despite initial review prior to signing should be taken in context when interpreting.  If such errors prevent a clear understanding of the note/communication, please contact the provider/office for clarification.

## 2025-05-23 NOTE — PT/OT/SLP PROGRESS
"Physical Therapy  Treatment    Marysol Yancey   MRN: [id number]   Admitting Diagnosis: Acute on chronic diastolic heart failure    PT Received On: 05/23/25  PT Start Time: 1045     PT Stop Time: 1110    PT Total Time (min): 25 min       Billable Minutes:  Gait Training 15 and Therapeutic Activity 10    Treatment Type: Treatment  PT/PTA: PT     Number of PTA visits since last PT visit: 0       General Precautions: Standard, fall  Orthopedic Precautions: N/A  Braces: N/A  Respiratory Status: Nasal cannula, flow 2 L/min         Subjective:  Communicated with NURSE AND EPIC CHART REVIEW  prior to session.   PT AGREED TO TX     Pain/Comfort  Pain Rating 1: 0/10  Pain Rating Post-Intervention 1: 0/10    Objective:   Patient found with: telemetry, peripheral IV, oxygen, Other (comments) (AVASYS)    Functional Mobility:  PT MET IN RM SUP IN BED. PT AGREED TO TX. PT SUP>SIT EOB WITH SBA. PT SCOOT TO EOB AND GT. BELT AND  SOCKS DONNED PRIOR TO OOB MOBILITY.  PT STOOD WITH RW X 2 TRIALS WITH CGA. PT GT TRAINED X 8' TO COMMODE FOR TOILETING WITH CGA OFF COMMODE AND MAX A TO MARTY DIAPER. PT GT TRAINED X 80' WITH RW AND CGA WITH O2 IN TOW. PT RETURNED TO RM T/F TO CHAIR WITH RW AND CGA.      Treatment and Education:  PT COMPLETED B LE TE X 10 REPS OF AP, TKE AND MIP FOR LE STRENGTHENING. PT EDUCATED ON "CALL DON'T FALL", ENCOURAGED TO CALL FOR ASSISTANCE WITH ALL NEEDS FOR OOB MOBILITY.     AM-PAC 6 CLICK MOBILITY  How much help from another person does this patient currently need?   1 = Unable, Total/Dependent Assistance  2 = A lot, Maximum/Moderate Assistance  3 = A little, Minimum/Contact Guard/Supervision  4 = None, Modified [place]/Independent    Turning over in bed (including adjusting bedclothes, sheets and blankets)?: 4  Sitting down on and standing up from a chair with arms (e.g., wheelchair, bedside commode, etc.): 3  Moving from lying on back to sitting on the side of the bed?: 4  Moving to and from a bed to a " chair (including a wheelchair)?: 3  Need to walk in hospital room?: 3  Climbing 3-5 steps with a railing?: 1  Basic Mobility Total Score: 18    AM-PAC Raw Score CMS G-Code Modifier Level of Impairment Assistance   6 % Total / Unable   7 - 9 CM 80 - 100% Maximal Assist   10 - 14 CL 60 - 80% Moderate Assist   15 - 19 CK 40 - 60% Moderate Assist   20 - 22 CJ 20 - 40% Minimal Assist   23 CI 1-20% SBA / CGA   24 CH 0% Independent/ Mod I     Patient left up in chair with call button in reach and chair alarm on.    Assessment:  PT VERÓNICA TX WITH INC FATIGUE     Rehab identified problem list/impairments: weakness, impaired endurance, impaired balance, decreased safety awareness, decreased ROM, impaired coordination, gait instability, decreased lower extremity function, impaired functional mobility, impaired self care skills, impaired cognition    Rehab potential is good.    Activity tolerance: Good    Discharge recommendations: Low Intensity Therapy (24 HOUR CARE)      Barriers to discharge:      Equipment recommendations: walker, rolling     GOALS:   Multidisciplinary Problems       Physical Therapy Goals          Problem: Physical Therapy    Goal Priority Disciplines Outcome Interventions   Physical Therapy Goal     PT, PT/OT     Description: LT25  1. PT WILL COMPLETE BED MOBILITY IND  2. PT WILL STAND T/F TO CHAIR WITH RW MOD I FOR OOB TOLERANCE  3. PT WILL GT TRAIN X 250' WITH RW AND S TO PROGRESS GT.   4. PT WILL INC AMPAC SCORE BY 2 POINTS TO PROGRESS GROSS FUNC MOBILITY.                          DME Justifications:  No DME recommended requiring DME justifications    PLAN:    Patient to be seen 3 x/week to address the above listed problems via gait training, therapeutic activities, therapeutic exercises  Plan of Care expires: 25  Plan of Care reviewed with: patient         2025

## 2025-05-23 NOTE — ASSESSMENT & PLAN NOTE
-Presents with volume overload/SOB  -Continue IV diuresis  -Continue Coreg  -Last clinic f/u in 2022, TTE pending  -Strict I's/O's    5/22/25  -Continue IV diuresis   -Continue Coreg   - Echo reviewed   - Strict I's/O's    5/23/2025  -Continue Coreg  -Continue IV Lasix---transition to po upon d/c

## 2025-05-23 NOTE — ASSESSMENT & PLAN NOTE
Patient has Diastolic (HFpEF) heart failure that is Acute on chronic. On presentation their CHF was decompensated. Evidence of decompensated CHF on presentation includes: edema, orthopnea, dyspnea on exertion (ABURTO), and shortness of breath. The etiology of their decompensation is likely dietary indiscretion and medication non-compliance. Most recent BNP and echo results are listed below.  Recent Labs     05/21/25  0837   *     Latest ECHO  Results for orders placed during the hospital encounter of 08/19/22    Echo Saline Bubble? No    Interpretation Summary  · The left ventricle is normal in size with concentric hypertrophy and normal systolic function.  · The estimated PA systolic pressure is 25 mmHg.  · The estimated ejection fraction is 55%.  · Indeterminate left ventricular diastolic function.  · Normal right ventricular size with normal right ventricular systolic function.  · Normal central venous pressure (3 mmHg).  · Mild mitral regurgitation.  · There is abnormal septal wall motion consistent with right ventricular pacemaker.    Current Heart Failure Medications  furosemide injection 40 mg, Daily, Intravenous  hydrALAZINE injection 5 mg, Every 4 hours PRN, Intravenous  carvediloL tablet 6.25 mg, 2 times daily with meals, Oral    Plan  - Monitor strict I&Os and daily weights.    - Place on telemetry  - Low sodium diet  - Place on fluid restriction of 1.5 L.   - Cardiology has been consulted  - The patient's volume status is stable but not at their baseline as indicated by edema, orthopnea, dyspnea on exertion (ABURTO), and shortness of breath

## 2025-05-23 NOTE — SUBJECTIVE & OBJECTIVE
Review of Systems   Constitutional: Positive for malaise/fatigue.   HENT: Negative.     Eyes: Negative.    Cardiovascular:  Positive for dyspnea on exertion and leg swelling.   Respiratory:  Positive for shortness of breath.    Endocrine: Negative.    Hematologic/Lymphatic: Bruises/bleeds easily.   Skin: Negative.    Musculoskeletal:  Positive for arthritis and joint pain.   Gastrointestinal:  Positive for bloating.   Genitourinary: Negative.    Neurological: Negative.    Psychiatric/Behavioral: Negative.     Allergic/Immunologic: Negative.      Objective:     Vital Signs (Most Recent):  Temp: 98.4 °F (36.9 °C) (05/23/25 0729)  Pulse: 85 (05/23/25 0920)  Resp: 18 (05/23/25 0920)  BP: 138/74 (05/23/25 0729)  SpO2: 98 % (05/23/25 0920) Vital Signs (24h Range):  Temp:  [97.5 °F (36.4 °C)-98.8 °F (37.1 °C)] 98.4 °F (36.9 °C)  Pulse:  [58-86] 85  Resp:  [17-18] 18  SpO2:  [96 %-98 %] 98 %  BP: (103-146)/(56-86) 138/74     Weight: 73.7 kg (162 lb 7.7 oz)  Body mass index is 31.73 kg/m².     SpO2: 98 %         Intake/Output Summary (Last 24 hours) at 5/23/2025 1054  Last data filed at 5/23/2025 0944  Gross per 24 hour   Intake 720 ml   Output 1050 ml   Net -330 ml       Lines/Drains/Airways       Peripheral Intravenous Line  Duration                  Peripheral IV - Single Lumen 05/22/25 2200 22 G Anterior;Right Forearm <1 day                       Physical Exam  Vitals and nursing note reviewed.   Constitutional:       Comments: On supplemental O2   HENT:      Head: Normocephalic and atraumatic.   Eyes:      Pupils: Pupils are equal, round, and reactive to light.   Cardiovascular:      Rate and Rhythm: Normal rate and regular rhythm.      Heart sounds: S1 normal and S2 normal. No murmur heard.     Comments: AICD well-healed  Pulmonary:      Effort: Pulmonary effort is normal.      Comments: Diminished at bases but improved   Abdominal:      Comments: +mild suprapubic tenderness   Musculoskeletal:      Right lower leg:  "Edema present.      Left lower leg: Edema present.   Skin:     General: Skin is warm and dry.   Psychiatric:         Mood and Affect: Mood normal.         Behavior: Behavior normal.            Significant Labs: CMP   Recent Labs   Lab 05/22/25  0458 05/23/25  0432    140   K 3.2* 4.0    105   CO2 25 26   GLU 95 97   BUN 29 33*   CREATININE 1.8* 1.9*   CALCIUM 8.6* 8.8   PROT 6.1 6.1   ALBUMIN 3.0* 3.0*   BILITOT 0.4 0.4   ALKPHOS 67 62   AST 18 18   ALT 13 13   ANIONGAP 9 9   , CBC   Recent Labs   Lab 05/22/25  0458 05/23/25  0432   WBC 6.58 6.67   HGB 12.9 12.3   HCT 40.8 39.9    241   , Troponin No results for input(s): "TROPONINIHS" in the last 48 hours., and All pertinent lab results from the last 24 hours have been reviewed.    Significant Imaging: Echocardiogram: Transthoracic echo (TTE) complete ([facility] Only):   Results for orders placed or performed during the hospital encounter of 05/21/25   Echo   Result Value Ref Range    BSA 1.86 m2    LVOT stroke volume 54.6 cm3    LVIDd 4.1 3.5 - 6.0 cm    LV Systolic Volume 27 mL    LV Systolic Volume Index 15.1 mL/m2    LVIDs 2.7 2.1 - 4.0 cm    LV Diastolic Volume 72 mL    LV Diastolic Volume Index 40.22 mL/m2    Left Ventricular End Systolic Volume by Teichholz Method 27.12 mL    Left Ventricular End Diastolic Volume by Teichholz Method 72.30 mL    IVS 1.5 (A) 0.6 - 1.1 cm    LVOT diameter 2.0 cm    LVOT area 3.1 cm2    FS 34.1 28 - 44 %    Left Ventricle Relative Wall Thickness 0.63 cm    PW 1.3 (A) 0.6 - 1.1 cm    LV mass 216.6 g    LV Mass Index 121.0 g/m2    MV Peak E Pierce 0.84 m/s    TDI LATERAL 0.05 m/s    TDI SEPTAL 0.04 m/s    E/E' ratio 19 m/s    MV Peak A Pierce 1.32 m/s    TR Max Pierce 2.4 m/s    E/A ratio 0.64     IVRT 57 msec    E wave deceleration time 181 msec    LV SEPTAL E/E' RATIO 21.0 m/s    LV LATERAL E/E' RATIO 16.8 m/s    LVOT peak pierce 1.0 m/s    Left Ventricular Outflow Tract Mean Velocity 0.83 cm/s    Left Ventricular Outflow " Tract Mean Gradient 2.85 mmHg    RVOT peak VTI 13.4 cm    TAPSE 1.9 cm    LA size 4.2 cm    Left Atrium Minor Axis 4.9 cm    Left Atrium Major Axis 4.7 cm    RA Major Axis 3.08 cm    AV mean gradient 4 mmHg    AV peak gradient 8 mmHg    Ao peak jyoti 1.4 m/s    Ao VTI 25.3 cm    LVOT peak VTI 17.4 cm    AV valve area 2.2 cm²    AV Velocity Ratio 0.71     AV index (prosthetic) 0.69     DESTINEY by Velocity Ratio 2.2 cm²    Mr max jyoti 4.39 m/s    MV stenosis pressure 1/2 time 52.43 ms    MV valve area p 1/2 method 4.20 cm2    TV mean gradient 25 mmHg    Triscuspid Valve Regurgitation Peak Gradient 23 mmHg    PV mean gradient 3 mmHg    RVOT peak jyoti 0.93 m/s    Ao root annulus 2.8 cm    STJ 2.8 cm    Ascending aorta 2.6 cm    ASI 1.5 cm/m2    IVC diameter 1.44 cm    Mean e' 0.05 m/s    ZLVIDS -0.99     ZLVIDD -1.88     IGOR 31 mL/m2    LA Vol 55 cm3    LA WIDTH 3.2 cm    RA Width 1.8 cm    EF 50 %    TV resting pulmonary artery pressure 26 mmHg    RV TB RVSP 5 mmHg    Est. RA pres 3 mmHg    Narrative      Left Ventricle: The left ventricle is normal in size. Moderately   increased wall thickness. There is concentric hypertrophy. Normal wall   motion. Septal motion is consistent with bundle branch block. There is low   normal systolic function. Ejection fraction is approximately 50%.    Right Ventricle: The right ventricle is normal in size Wall thickness   is normal. Systolic function is normal.    Pulmonary Artery: The estimated pulmonary artery systolic pressure is   26 mmHg.    IVC/SVC: Normal venous pressure at 3 mmHg.      and EKG: Reviewed

## 2025-05-23 NOTE — PLAN OF CARE
AVS virtually reviewed with patient and mostly great grand son due to patient's difficulty hearing in its entirety with emphasis on diet, medications, follow-up appointments and detailed reasons to return to the ED. Patient has a bp cuff and scale at home and aware of fluid restrictions. Patient also encouraged to utilize their patient portal. Ease and convenience of use reiterated. Education complete and patient voiced understanding. All questions answered. Discharge teaching complete.

## 2025-05-23 NOTE — PROGRESS NOTES
[facility] ([facility])  Cardiology  Progress Note    Patient Name: Marysol Yancey  MRN: [id number]  Admission Date: 5/21/2025  Hospital Length of Stay: 2 days  Code Status: DNR   Attending Physician: Bret De La Cruz DO   Primary Care Physician: Bruno Nugent MD  Expected Discharge Date: 5/23/2025  Principal Problem:Acute on chronic diastolic heart failure    Subjective:   HPI:  Ms. Yancey is a 89 year old female patient whose current medical conditions include CAD, cardiac arrest s/p AICD, CHF, CRI, depression, DM type II, HLD, HTN, LBBB, mitral regurgitation, thyroid disease, and CAD who presented to [facility] ED this AM due to increased SOB over the past few weeks that acutely worsened this AM. Associated symptoms included BLE edema and orthopnea. She denied any associated fever, chills, palpitations, near syncope, or syncope. Initial workup in ED revealed creatinine of 1.9, BNP of 640, troponin of 0.076. CXR showed chronic ILD as well as edema/ ? underlying PNA. Patient given IV Lasix and admitted for further evaluation and treatment. Cardiology consulted to assist with management. Patient seen and examined today, resting in bed.  Still SOB/overloaded. Denies any CP symptoms. States she tried purchasing something OTC for fluid removal but it did not help. She reports compliance with her medications. Last seen in clinic by Dr. Mares in 2022. Prior [facility] in 2013 showing LAD 30-40% 100% RCA occlusion, OM1 40-50%     Hospital Course:   5/22/2025 - Patient seen and examined today. She is laying in bed in no acute distress. No acute events overnight. She reports she is still short of breath. Denies chest pain.     5/23/2025-Patient seen and examined today, resting in bed. More SOB this AM after Chin was removed. Complains of urinary retention/abdominal bloating. BLE edema improving. Labs reviewed. Creatinine 1.9.        Review of Systems   Constitutional: Positive for malaise/fatigue.   HENT: Negative.      Eyes: Negative.    Cardiovascular:  Positive for dyspnea on exertion and leg swelling.   Respiratory:  Positive for shortness of breath.    Endocrine: Negative.    Hematologic/Lymphatic: Bruises/bleeds easily.   Skin: Negative.    Musculoskeletal:  Positive for arthritis and joint pain.   Gastrointestinal:  Positive for bloating.   Genitourinary: Negative.    Neurological: Negative.    Psychiatric/Behavioral: Negative.     Allergic/Immunologic: Negative.      Objective:     Vital Signs (Most Recent):  Temp: 98.4 °F (36.9 °C) (05/23/25 0729)  Pulse: 85 (05/23/25 0920)  Resp: 18 (05/23/25 0920)  BP: 138/74 (05/23/25 0729)  SpO2: 98 % (05/23/25 0920) Vital Signs (24h Range):  Temp:  [97.5 °F (36.4 °C)-98.8 °F (37.1 °C)] 98.4 °F (36.9 °C)  Pulse:  [58-86] 85  Resp:  [17-18] 18  SpO2:  [96 %-98 %] 98 %  BP: (103-146)/(56-86) 138/74     Weight: 73.7 kg (162 lb 7.7 oz)  Body mass index is 31.73 kg/m².     SpO2: 98 %         Intake/Output Summary (Last 24 hours) at 5/23/2025 1054  Last data filed at 5/23/2025 0944  Gross per 24 hour   Intake 720 ml   Output 1050 ml   Net -330 ml       Lines/Drains/Airways       Peripheral Intravenous Line  Duration                  Peripheral IV - Single Lumen 05/22/25 2200 22 G Anterior;Right Forearm <1 day                       Physical Exam  Vitals and nursing note reviewed.   Constitutional:       Comments: On supplemental O2   HENT:      Head: Normocephalic and atraumatic.   Eyes:      Pupils: Pupils are equal, round, and reactive to light.   Cardiovascular:      Rate and Rhythm: Normal rate and regular rhythm.      Heart sounds: S1 normal and S2 normal. No murmur heard.     Comments: AICD well-healed  Pulmonary:      Effort: Pulmonary effort is normal.      Comments: Diminished at bases but improved   Abdominal:      Comments: +mild suprapubic tenderness   Musculoskeletal:      Right lower leg: Edema present.      Left lower leg: Edema present.   Skin:     General: Skin is warm and  "dry.   Psychiatric:         Mood and Affect: Mood normal.         Behavior: Behavior normal.            Significant Labs: CMP   Recent Labs   Lab 05/22/25  0458 05/23/25  0432    140   K 3.2* 4.0    105   CO2 25 26   GLU 95 97   BUN 29 33*   CREATININE 1.8* 1.9*   CALCIUM 8.6* 8.8   PROT 6.1 6.1   ALBUMIN 3.0* 3.0*   BILITOT 0.4 0.4   ALKPHOS 67 62   AST 18 18   ALT 13 13   ANIONGAP 9 9   , CBC   Recent Labs   Lab 05/22/25  0458 05/23/25  0432   WBC 6.58 6.67   HGB 12.9 12.3   HCT 40.8 39.9    241   , Troponin No results for input(s): "TROPONINIHS" in the last 48 hours., and All pertinent lab results from the last 24 hours have been reviewed.    Significant Imaging: Echocardiogram: Transthoracic echo (TTE) complete (C[facility] Only):   Results for orders placed or performed during the hospital encounter of 05/21/25   Echo   Result Value Ref Range    BSA 1.86 m2    LVOT stroke volume 54.6 cm3    LVIDd 4.1 3.5 - 6.0 cm    LV Systolic Volume 27 mL    LV Systolic Volume Index 15.1 mL/m2    LVIDs 2.7 2.1 - 4.0 cm    LV Diastolic Volume 72 mL    LV Diastolic Volume Index 40.22 mL/m2    Left Ventricular End Systolic Volume by Teichholz Method 27.12 mL    Left Ventricular End Diastolic Volume by Teichholz Method 72.30 mL    IVS 1.5 (A) 0.6 - 1.1 cm    LVOT diameter 2.0 cm    LVOT area 3.1 cm2    FS 34.1 28 - 44 %    Left Ventricle Relative Wall Thickness 0.63 cm    PW 1.3 (A) 0.6 - 1.1 cm    LV mass 216.6 g    LV Mass Index 121.0 g/m2    MV Peak E Pierce 0.84 m/s    TDI LATERAL 0.05 m/s    TDI SEPTAL 0.04 m/s    E/E' ratio 19 m/s    MV Peak A Pierce 1.32 m/s    TR Max Pierce 2.4 m/s    E/A ratio 0.64     IVRT 57 msec    E wave deceleration time 181 msec    LV SEPTAL E/E' RATIO 21.0 m/s    LV LATERAL E/E' RATIO 16.8 m/s    LVOT peak pierce 1.0 m/s    Left Ventricular Outflow Tract Mean Velocity 0.83 cm/s    Left Ventricular Outflow Tract Mean Gradient 2.85 mmHg    RVOT peak VTI 13.4 cm    TAPSE 1.9 cm    LA size 4.2 cm    " Left Atrium Minor Axis 4.9 cm    Left Atrium Major Axis 4.7 cm    RA Major Axis 3.08 cm    AV mean gradient 4 mmHg    AV peak gradient 8 mmHg    Ao peak jyoti 1.4 m/s    Ao VTI 25.3 cm    LVOT peak VTI 17.4 cm    AV valve area 2.2 cm²    AV Velocity Ratio 0.71     AV index (prosthetic) 0.69     DESTINEY by Velocity Ratio 2.2 cm²    Mr max jyoti 4.39 m/s    MV stenosis pressure 1/2 time 52.43 ms    MV valve area p 1/2 method 4.20 cm2    TV mean gradient 25 mmHg    Triscuspid Valve Regurgitation Peak Gradient 23 mmHg    PV mean gradient 3 mmHg    RVOT peak jyoti 0.93 m/s    Ao root annulus 2.8 cm    STJ 2.8 cm    Ascending aorta 2.6 cm    ASI 1.5 cm/m2    IVC diameter 1.44 cm    Mean e' 0.05 m/s    ZLVIDS -0.99     ZLVIDD -1.88     IGOR 31 mL/m2    LA Vol 55 cm3    LA WIDTH 3.2 cm    RA Width 1.8 cm    EF 50 %    TV resting pulmonary artery pressure 26 mmHg    RV TB RVSP 5 mmHg    Est. RA pres 3 mmHg    Narrative      Left Ventricle: The left ventricle is normal in size. Moderately   increased wall thickness. There is concentric hypertrophy. Normal wall   motion. Septal motion is consistent with bundle branch block. There is low   normal systolic function. Ejection fraction is approximately 50%.    Right Ventricle: The right ventricle is normal in size Wall thickness   is normal. Systolic function is normal.    Pulmonary Artery: The estimated pulmonary artery systolic pressure is   26 mmHg.    IVC/SVC: Normal venous pressure at 3 mmHg.      and EKG: Reviewed  Assessment and Plan:   Patient who presents with decompensated CHF. Clinically improving---switch to po diuretics upon d/c. She needs ASA on board if no contraindications given history of CAD. Can f/u in CHF clinic.    * Acute on chronic diastolic heart failure  -Presents with volume overload/SOB  -Continue IV diuresis  -Continue Coreg  -Last clinic f/u in 2022, TTE pending  -Strict I's/O's    5/22/25  -Continue IV diuresis   -Continue Coreg   - Echo reviewed   - Strict  I's/O's    5/23/2025  -Continue Coreg  -Continue IV Lasix---transition to po upon d/c      Acute kidney injury superimposed on chronic kidney disease  -Per primary team    Elevated troponin  -Initial troponin 0.076, elevation likely due to demand ischemia from elevated BP/decompensated CHF/renal failure  -No CP  -ASA if tolerated  -Continue BB  -Prior allergy to statin/BB  -TTE pending    5/22/25  -troponin trending down  -No CP   - ASA if tolerated   -Continue BB       CRI (chronic renal insufficiency)  -Per primary team  -Creatinine 1.9 this AM but seems to be having issues with urinary retention post Chin removal    Hypertension  -Continue BB  -Titrate meds  -BP elevated upon admission    5/22/25  - Continue BB   - Titrate meds    ICD (implantable cardioverter-defibrillator) in place  -Denies AICD shocks        VTE Risk Mitigation (From admission, onward)           Ordered     enoxaparin injection 30 mg  Daily         05/21/25 1034     IP VTE HIGH RISK PATIENT  Once         05/21/25 1031     Place sequential compression device  Until discontinued         05/21/25 1031                    Grazyna Carr PA-C  Cardiology  [facility] - Tele[facility] ([facility])

## 2025-05-23 NOTE — DISCHARGE INSTRUCTIONS
-You were admitted to our hospital for treatment of heart failure concerns. Over the course of your hospitalization, our Cardiology team evaluated you    -Please read the attached information regarding heart failure and go to your nearest ED if any of the alarm/concerning signs develop.    -You have an upcoming appointment with Cardiology set up.  IT IS EXTREMELY IMPORTANT FOR YOU TO FOLLOW UP WITH THEM AS THEY CAN FURTHER EVALUATE YOUR CARDIAC CONCERNS.  Please give our scheduling line a call at [phone] to schedule an appointment with them if it has not been setup already.    -Your blood pressures were also elevated during your hospitalization.  As we discussed, as you resume your home blood pressure medications, please make sure to frequently monitor your home BP, keep a BP log and take it to your next healthcare provider visit.  Please reach out to a healthcare provider if your blood pressures are  too low or too high.      -Your labs remained stable, but some labs still remained abnormal. As we discussed, it is extremely important for you to followup with a primary care physician within 1 week for repeat lab work to ensure normalization, follow up of pending labs, medication adjustments/refills, routine post-discharge care, and any other evaluations as necessitated.     For Weight Gain > 2-3 lbs in 1 day or 4-6 lbs over 1 week notify PCP          Our goal at [facility] is to always give you outstanding care and exceptional service. You may receive a survey from [employer] by mail, text or e-mail in the next 24-48 hours asking about the care you received with us. The survey should only take 5-10 minutes to complete and is very important to us.     Your feedback provides us with a way to recognize our staff who work tirelessly to provide the best care! Also, your responses help us learn how to improve when your experience was below our aspiration of excellence. We are always looking for ways to improve your  stay. We WILL use your feedback to continue making improvements to help us provide the highest quality care. We keep your personal information and feedback confidential. We appreciate your time completing this survey and can't wait to hear from you!!!    We look forward to your continued care with us! Thanks so much for choosing [facility] for your healthcare needs!

## 2025-05-23 NOTE — PLAN OF CARE
[facility] - Tele[facility] (Hospital)  Discharge Final Note    Primary Care Provider: Bruno Nugent MD    Expected Discharge Date: 5/23/2025    Final Discharge Note (most recent)       Final Note - 05/23/25 1613          Final Note    Assessment Type Final Discharge Note     Anticipated Discharge Disposition Home-Health Care Svc        Post-Acute Status    Post-Acute Authorization Home Health;HME     HME Status Referrals Sent     Home Health Status Referrals Sent     Discharge Delays None known at this time                     Important Message from Medicare               DC Disposition: [facility] and [facility], for Home Oxygen  Family Notified: Patient at bedside and grandson over the phone  Transportation: personal transportation    Patient needed Home Health services set up upon discharge. Patient has referral to [facility] set up and information has been added to Patient's AVS.    Patient has referral sent to [facility] for set up of home oxygen. SW will deliver portable oxygen tank to patient's room for DC home.     Patient has resources to schedule hospital follow up with non-[facility] PCP on AVS.

## 2025-05-23 NOTE — ASSESSMENT & PLAN NOTE
-Per primary team  -Creatinine 1.9 this AM but seems to be having issues with urinary retention post Chin removal

## 2025-05-26 ENCOUNTER — TELEPHONE (OUTPATIENT)
Dept: CARDIOLOGY | Facility: CLINIC | Age: OVER 89
End: 2025-05-26
Payer: COMMERCIAL

## 2025-05-26 DIAGNOSIS — I50.33 ACUTE ON CHRONIC DIASTOLIC HEART FAILURE: Primary | ICD-10-CM

## 2025-05-26 NOTE — TELEPHONE ENCOUNTER
"[facility]) hospital discharge 48-72 hour phone follow up completed.     Most Recent Hospital Discharge Date: 5/23/25  Last admission Diagnosis/chief complaint:acute HF    TCC nurse Navigator spoke with pt    Current Patient reported weight: no home weights    Current Patient reported blood pressure and heart rate: no bp readings.     Pt reports the following:  []  Shortness of Breath with Activity  []  Shortness of Breath at rest   []  Fatigue  [x]  Edema -improved  [] Chest pain or tightness  [] Weight Increase since discharge  [x] None of the above    Medications:   Discharge medication reviewed with pt.  Pt reports having medication list available and has all medications at home for use per list.     Education:   Confirmed pt received "Home Care Guide for Heart Failure Patients" while admitted.   Reviewed key points as listed below.     Recommend 2 -3 gram sodium restriction and 1500 cc-2000 cc fluid restriction.  Encourage physical activity with graded exercise program.  Requested patient to weigh themselves daily, and to notify us if their weight increases by more than 3 lbs in 1 day or 5 lbs in 3 days.   Reminded patient to use "Daily weight and symptom tracker" to record and guide patient on when and how to call [facility]. PT may also use symptom tracker if no scale available  Pt reports being in the green (color) Zone. If in yellow/red, reminded that they should be calling [facility] today or now.     Watch for these Signs and Symptoms: If any of these occur, contact [facility] immediately:   Increase in shortness of breath with movement   Increase in swelling in your legs and ankles   Weight gain of more than 3 pounds in a day or 5 pounds in 3 days.   Difficulty breathing when you are lying down   Worsening fatigue or tiredness   Stomach bloating, a full feeling or a loss of appetite   Increased coughing--especially when you are lying down      Pt was able to verbalize back to nurse " in their own words correct diet/fluid restrictions, necessity for exercise, warning signs and symptoms, when and how to contact their TCC team.      Pt educated on follow-up plan while in HFT[facility] program to include:   Week 1 -  F/u appt with Provider and HF nurse (Date) pt declined appt with [facility]. States she is 89 and does not want the trouble of coming to the . H[facility] episode closed. F/u appt cancelled.    Week 2-5 - In person/ Virtual/ phone call check ins    Week 5-7 - Pt will discharge from H[facility] and transition to longterm care provider (Cardiology/PCP/ Advanced Heart Failure).      Patient active on myChart? No      Pt given the following contact information for ease of communication: [phone] (Mon-Fri, 8a-5p) & for urgent issues on the weekend call Rebecca on-call [phone] to page the Cardiology MD on call.  Pt also encouraged utilize [facility] messaging as well.      Will follow up with pt at first clinic visit and HF nurse navigator available for pt questions, issues or concerns.

## 2025-05-28 ENCOUNTER — TELEPHONE (OUTPATIENT)
Dept: ADMINISTRATIVE | Facility: CLINIC | Age: OVER 89
End: 2025-05-28
Payer: COMMERCIAL

## 2025-05-28 NOTE — DISCHARGE SUMMARY
"[facility] ([facility])  [facility] Medicine  Discharge Summary      Patient Name: Marysol Yancey  MRN: [id number]  SWEETIE: [id number]  Patient Class: IP- Inpatient  Admission Date: 5/21/2025  Hospital Length of Stay: 2 days  Discharge Date and Time: 5/23/2025  5:23 PM  Attending Physician: No att. providers found   Discharging Provider: Bret De La Cruz DO  Primary Care Provider: Bruno Nugent MD    Primary Care Team: Networked reference to record PCT     HPI:   Marysol Yancey is a 89 year old female who  has a past medical history of Abnormal ECG, AICD (automatic cardioverter/defibrillator) present, CAD (coronary artery disease), Cancer, Cardiac arrest, CHF (congestive heart failure), CRI (chronic renal insufficiency), Depression, Diabetes mellitus, Fatigue, Heart block, Hyperlipidemia, Hypertension, LBBB (left bundle branch block), Mitral regurgitation, Mixed hyperlipidemia, Pulmonary nodule, Thyroid disease, TR (tricuspid regurgitation), and Ventricular fibrillation who presented to the Emergency Department for evaluation of SOB. Associated symptoms include: BLE swellling. Onset "weeks ago" but progressively became worse. She reports she is now SOB with exertion and at rest. Hx of CHF. Last 2D ECHO in 02/2022 showed EF 55% with indeterminate left ventricular diastolic function. She reportedly only takes 3 medications (clonidine, levothyroxine, and PRN med), furosemide is not one. She has been sleeping in her recliner due to current symptoms.     In ED WBC count 8.69K, Hgb/Hct 15.1/47.2, creatinine 1.9 (1.3 in 09/2023), , troponin 0.076. UA negative for infectious process. CXR showed background of chronic interstitial lung disease. Acute on chronic interstitial edema or pneumonia not entirely excluded. The lungs demonstrate no evidence of consolidation. No evidence of pleural effusion or pneumothorax. Hypoxic on scene, [facility] reported O2 sat of 91% on RA so they placed her on 2L of O2 nasal cannula. " Prior Tx 2 Nitro SL en route with [facility]. She received in the ED lasix 40 mg IV. Following a comprehensive evaluation of the patient's clinical presentation, vital signs, laboratory results, and risk factors, myself with the attending made  the active decision to admit the patient for further monitoring, diagnostic work-up, and initiation of appropriate treatment, given the potential for clinical deterioration if managed in an outpatient setting. Pt admitted to  under observation status for acute on chronic diastolic heart failure.     * No surgery found *      Hospital Course:   Admitted to Hospital Medicine for evaluation of CHF exacerbation concerns.  Started on diuresis with furosemide IV. Cardiology consulted.  Suspect troponin elevation likely secondary to demand ischemia from combination of hypertension, renal failure, CHF exacerbation. TTE completed.  Patient continued to show improvement in respiratory symptoms throughout hospital course.  Patient underwent voiding trial overnight on May 22nd.  Patient passed voiding trial and multiple bladder scans throughout remainder of hospital course did not note any concerns for urinary retention.  Renal function continued to remain stable throughout hospital course, suspect baseline given history of CKD.  On May 23rd, cardiology evaluation noted clinical improvement.  Recommendations for continued close outpatient follow up at [facility].  Discussed with Cardiology and de-escalated furosemide IV to furosemide 20 mg daily and patient was extensively counseled on importance of following up with close outpatient cardiology appointment that was already set up.  Respiratory therapy evaluated the patient and noted home supplemental oxygen requirement of 2LPM.        Discharge plan of care was discussed at length with patient/family including patient's need for close outpatient follow-up.  Instructed on attending upcoming follow up with Cardiology for further evaluation.  Instructed on PCP follow up within 1 week with repeat lab work to ensure normalization, follow up of pending labs, or any further evaluation as necessitated. Counseled on frequent home BP monitoring while resuming home BP meds, patient reports having a BP cuff at home.  Counseled on hold parameters.  Counseled on indication for aspirin per cardiology recs, patient already reports taking aspirin daily. All questions and concerns were answered. Return precautions provided.  Patient instructed to return to the hospital or seek medical care if baseline status should suddenly change, return of symptoms occurs, or for any new or concerning symptoms that arise.  Patient was in agreement with plan of care going forward. Patient continued to remain afebrile, hemodynamically stable with supplemental oxygen, able to tolerate diet. RISHABH/TRI set up home health with repeat labs ordered.  Patient seen and examined on the day of discharge. Patient deemed stable for discharge.        Goals of Care Treatment Preferences:  Code Status: DNR         Consults:   Consults (From admission, onward)          Status Ordering Provider     Inpatient consult to Cardiology  Once        Provider:  Grazyna Carr PA-C    Completed ARMANDO SILVA     Inpatient consult to Social Work/Case Management  Once        Provider:  (Not yet assigned)    Completed ARMANDO SILVA     Inpatient consult to Registered Dietitian/Nutritionist  Once        Provider:  (Not yet assigned)    Completed ARMANDO SILVA            Final Active Diagnoses:    Diagnosis Date Noted POA    PRINCIPAL PROBLEM:  Acute on chronic diastolic heart failure [I50.33] 05/21/2025 Yes    Acute kidney injury superimposed on chronic kidney disease [N17.9, N18.9] 05/22/2025 Unknown    Elevated troponin [R79.89] 05/21/2025 Yes    Hypertension [I10] 06/14/2013 Yes     Chronic    ICD (implantable cardioverter-defibrillator) in place [Z95.810] 06/14/2013 Yes    CRI (chronic renal  insufficiency) [N18.9] 06/14/2013 Yes     Chronic    History of cardiac arrest [Z86.74] 06/14/2013 Not Applicable    History of ventricular fibrillation [Z86.79] 06/14/2013 Not Applicable      Problems Resolved During this Admission:       Discharged Condition: stable    Disposition: Home-Health Care [facility] [place]    Follow Up:   Follow-up Information       Bruno Nugent MD. Schedule an appointment as soon as possible for a visit in 1 week(s).    Specialty: Internal Medicine  Why: Your labs remained stable, but some labs still remained abnormal. As we discussed, it is extremely important for you to followup with a primary care physician within 1 week for repeat lab work to ensure normalization, follow up of pending labs, medication adjustments/refills, routine post-discharge care, and any other evaluations as necessitated.  Contact information:  [address] [id number] [phone]               [facility] - [facility]. Go on 5/29/2025.    Specialty: Cardiology  Why: 5/29/2025 9:00 AM Tahis Edwards PA    You have an upcoming appointment with Cardiology set up.  IT IS EXTREMELY IMPORTANT FOR YOU TO FOLLOW UP WITH THEM AS THEY CAN FURTHER EVALUATE YOUR CARDIAC CONCERNS.  Please give our scheduling line a call at [phone] to schedule an appointment with them if it has not been setup already.  Contact information:  [address] [place] [address] [address] [place] [address] [phone]  Additional information:  Please take [address] to the Medical Office Building. Check in on the 4th floor.                         Patient Instructions:      OXYGEN FOR HOME USE     Order Specific Question Answer Comments   Liter Flow 2    Duration With activity    Qualifying Test Performed at: Activity    Oxygen saturation at rest 95    Oxygen saturation with activity 87    Oxygen saturation with activity on oxygen 93    Portable mode: continuous    Route nasal cannula    Device: home concentrator with portable  concentrator    Length of need (in months): 99 mos    Patient condition with qualifying saturation CHF    Height: 5' (1.524 m)    Weight: 73.7 kg (162 lb 7.7 oz)    Alternative treatment measures have been tried or considered and deemed clinically ineffective. Yes      ACCEPT - Ambulatory referral/consult to [facility]   Standing Status: Future   Referral Priority: Routine Referral Type: Consultation   Referral Reason: Specialty Services Required   Requested Specialty: Cardiology   Number of Visits Requested: 1     Notify your health care provider if you experience any of the following:  temperature >100.4     Notify your health care provider if you experience any of the following:  persistent nausea and vomiting or diarrhea     Notify your health care provider if you experience any of the following:  severe uncontrolled pain     Notify your health care provider if you experience any of the following:  redness, tenderness, or signs of infection (pain, swelling, redness, odor or green/yellow discharge around incision site)     Notify your health care provider if you experience any of the following:  difficulty breathing or increased cough     Notify your health care provider if you experience any of the following:  severe persistent headache     Notify your health care provider if you experience any of the following:  worsening rash     Notify your health care provider if you experience any of the following:  persistent dizziness, light-headedness, or visual disturbances     Notify your health care provider if you experience any of the following:  increased confusion or weakness     Notify your health care provider if you experience any of the following:   Order Comments: Diagnosis: CHF (Congestive Heart Failure) Exacerbation Alarm Signs  Please return to the ED emergently if you experience any of the following: sudden or worsening shortness of breath, especially while lying flat or during  minimal activity; rapid weight gain of more than 2-3 pounds in a day or 5 pounds in a week; new or worsening swelling in your legs, ankles, or abdomen; a persistent, dry, or frothy cough; severe fatigue or lightheadedness; or chest pain or pressure, as these may signal fluid overload or worsening heart function that requires urgent treatment.    Diagnosis: Respiratory Failure Alarm Signs  Please return to the ED emergently if you experience any of the following: increasing difficulty breathing or shortness of breath that limits your ability to speak or perform daily tasks; bluish lips or fingertips; confusion, agitation, or difficulty staying awake; rapid or irregular breathing; or any signs of extreme fatigue or inability to breathe comfortably even at rest, as these may indicate impending or active respiratory failure.    Diagnosis: High or Low Blood Pressure Emergency (Hypertensive or Hypotensive Crisis)  Please return to the ED emergently if you experience any of the following: severe headache, chest pain, shortness of breath, confusion or trouble speaking, vision changes, weakness or numbness on one side of the body, fainting, or dizziness that does not improve when sitting or lying down, as these may reflect dangerously high or low blood pressure requiring immediate intervention to prevent organ damage.    Diagnosis: Urinary Retention Alarm Signs  Please return to the ED emergently if you experience any of the following: an inability to urinate despite feeling the urge or bladder fullness, lower abdominal pain or pressure that becomes severe, nausea or vomiting, visible swelling of the lower abdomen, or fever and chills, as these may indicate bladder obstruction, infection, or kidney involvement that requires urgent evaluation and possible catheterization.    Diagnosis: Furosemide Adverse Effects  Please return to the ED emergently if you experience any of the following: severe dizziness or fainting,  especially when standing up; muscle cramps, weakness, or irregular heartbeat, which may indicate abnormal electrolyte levels; decreased urination or dark-colored urine suggesting dehydration or kidney injury; ringing in the ears or hearing loss; or any signs of allergic reaction such as rash, itching, or swelling of the face or throat.    Diagnosis: Worsening Renal Function Alarm Signs  Please return to the ED emergently if you experience any of the following: a significant decrease in urine output or complete inability to urinate; swelling in your legs, ankles, or around your eyes; sudden weight gain; shortness of breath or chest discomfort; confusion, drowsiness, or difficulty concentrating; persistent nausea, vomiting, or loss of appetite; or any symptoms of high potassium such as muscle weakness, numbness, or irregular heartbeat, as these may indicate a serious decline in kidney function that requires immediate medical attention.     Significant Labs:   Recent Labs   Lab 05/21/25 0837 05/22/25 0458 05/23/25 0432    141 140   K 3.9 3.2* 4.0    107 105   CO2 19* 25 26   BUN 29 29 33*   CREATININE 1.9* 1.8* 1.9*    95 97   ANIONGAP 14 9 9     Recent Labs   Lab 05/22/25 0458 05/23/25  0432   MG 2.2 2.2   PHOS 3.5 3.7     Recent Labs   Lab 05/21/25 0837 05/22/25 0458 05/23/25  0432   AST 22 18 18   ALT 19 13 13   ALKPHOS 82 67 62   BILITOT 0.3 0.4 0.4   ALBUMIN 3.9 3.0* 3.0*    Recent Labs   Lab 05/21/25 0837 05/22/25 0458 05/23/25  0432   WBC 8.69 6.58 6.67   HGB 15.1 12.9 12.3   HCT 47.2 40.8 39.9    225 241                    Significant Imaging:   X-Ray Chest AP Portable   Final Result      As above         Electronically signed by: River Baird MD   Date:    05/21/2025   Time:    09:21          Significant Cardiac Procedures  Echo  Result Date: 5/21/2025    Left Ventricle: The left ventricle is normal in size. Moderately   increased wall thickness. There is concentric  hypertrophy. Normal wall   motion. Septal motion is consistent with bundle branch block. There is low   normal systolic function. Ejection fraction is approximately 50%.    Right Ventricle: The right ventricle is normal in size Wall thickness   is normal. Systolic function is normal.    Pulmonary Artery: The estimated pulmonary artery systolic pressure is   26 mmHg.    IVC/SVC: Normal venous pressure at 3 mmHg.            Results for orders placed or performed during the hospital encounter of 05/21/25   EKG 12-lead    Collection Time: 05/21/25  8:21 AM   Result Value Ref Range    QRS Duration 124 ms    [facility] QTC Calculation 511 ms    Narrative    Test Reason : R06.02,    Vent. Rate : 109 BPM     Atrial Rate : 109 BPM     P-R Int : 176 ms          QRS Dur : 124 ms      QT Int : 380 ms       P-R-T Axes :  34  -4 133 degrees    QTcB Int : 511 ms    Sinus tachycardia with Premature atrial complexes  Left bundle branch block  When compared with ECG of 20-Sep-2023 04:33,  Left bundle branch block is now Present  Criteria for Anterior infarct are no longer Present  Confirmed by Samuel Mares ([id number]) on 5/21/2025 4:15:51 PM    Referred By: AAAREFERRAL SELF           Confirmed By: Samuel Mares         Pending Diagnostic Studies:       None           Medications:  Reconciled Home Medications:      Medication List        START taking these medications      furosemide 20 MG tablet  Commonly known as: LASIX  Take 1 tablet (20 mg total) by mouth once daily. HOLD FOR  BP WITH TOP NUMBER(systolic pressure) <110 OR BOTTOM NUMBER (diastolic pressure) <70            CONTINUE taking these medications      aspirin 81 MG EC tablet  Commonly known as: ECOTRIN  Take 1 tablet (81 mg total) by mouth once daily.     carvediloL 6.25 MG tablet  Commonly known as: COREG  Take 1 tablet (6.25 mg total) by mouth 2 (two) times daily with meals. HOLD FOR PULSE<65 OR BP WITH TOP NUMBER(systolic pressure) <110 OR BOTTOM NUMBER (diastolic pressure)  <70     cloNIDine 0.2 MG tablet  Commonly known as: CATAPRES  Take 1 tablet (0.2 mg total) by mouth 2 (two) times daily. HOLD FOR  BP WITH TOP NUMBER(systolic pressure) <110 OR BOTTOM NUMBER (diastolic pressure) <70     levothyroxine 25 MCG tablet  Commonly known as: SYNTHROID  Take 25 mcg by mouth every morning.              Indwelling Lines/Drains at time of discharge:   Lines/Drains/Airways       None                   Time spent on the discharge of patient: 45 minutes         Bret De La Cruz DO  Department of Hospital Medicine  '[facility] - Tele[facility] ([facility])    Voice recognition software was used in the creation of this note/communication and any sound-alike/typographical errors which may have occurred despite initial review prior to signing should be taken in context when interpreting.  If such errors prevent a clear understanding of the note/communication, please contact the provider/office for clarification.

## 2025-06-19 ENCOUNTER — LAB VISIT (OUTPATIENT)
Dept: LAB | Facility: HOSPITAL | Age: OVER 89
End: 2025-06-19
Payer: MEDICARE

## 2025-06-19 ENCOUNTER — OFFICE VISIT (OUTPATIENT)
Dept: CARDIOLOGY | Facility: CLINIC | Age: OVER 89
End: 2025-06-19
Payer: MEDICARE

## 2025-06-19 VITALS
SYSTOLIC BLOOD PRESSURE: 130 MMHG | HEIGHT: 60 IN | DIASTOLIC BLOOD PRESSURE: 80 MMHG | WEIGHT: 149.56 LBS | HEART RATE: 67 BPM | OXYGEN SATURATION: 97 % | BODY MASS INDEX: 29.36 KG/M2

## 2025-06-19 DIAGNOSIS — I25.10 CORONARY ARTERY DISEASE INVOLVING NATIVE CORONARY ARTERY OF NATIVE HEART WITHOUT ANGINA PECTORIS: Chronic | ICD-10-CM

## 2025-06-19 DIAGNOSIS — I25.9 CHRONIC ISCHEMIC HEART DISEASE: ICD-10-CM

## 2025-06-19 DIAGNOSIS — Z95.810 ICD (IMPLANTABLE CARDIOVERTER-DEFIBRILLATOR) IN PLACE: ICD-10-CM

## 2025-06-19 DIAGNOSIS — I10 PRIMARY HYPERTENSION: Chronic | ICD-10-CM

## 2025-06-19 DIAGNOSIS — I50.32 CHRONIC DIASTOLIC HEART FAILURE: ICD-10-CM

## 2025-06-19 DIAGNOSIS — I50.32 CHRONIC DIASTOLIC HEART FAILURE: Primary | ICD-10-CM

## 2025-06-19 DIAGNOSIS — Z86.74 HISTORY OF CARDIAC ARREST: ICD-10-CM

## 2025-06-19 DIAGNOSIS — E78.2 MIXED HYPERLIPIDEMIA: Chronic | ICD-10-CM

## 2025-06-19 PROBLEM — I50.30 DIASTOLIC HEART FAILURE: Status: ACTIVE | Noted: 2025-06-19

## 2025-06-19 LAB
ALBUMIN SERPL BCP-MCNC: 4 G/DL (ref 3.5–5.2)
ALP SERPL-CCNC: 89 UNIT/L (ref 40–150)
ALT SERPL W/O P-5'-P-CCNC: 14 UNIT/L (ref 10–44)
ANION GAP (OHS): 11 MMOL/L (ref 8–16)
AST SERPL-CCNC: 20 UNIT/L (ref 11–45)
BILIRUB SERPL-MCNC: 0.5 MG/DL (ref 0.1–1)
BNP SERPL-MCNC: 165 PG/ML (ref 0–99)
BUN SERPL-MCNC: 34 MG/DL (ref 10–30)
CALCIUM SERPL-MCNC: 10.1 MG/DL (ref 8.7–10.5)
CHLORIDE SERPL-SCNC: 103 MMOL/L (ref 95–110)
CO2 SERPL-SCNC: 28 MMOL/L (ref 23–29)
CREAT SERPL-MCNC: 1.6 MG/DL (ref 0.5–1.4)
GFR SERPLBLD CREATININE-BSD FMLA CKD-EPI: 30 ML/MIN/1.73/M2
GLUCOSE SERPL-MCNC: 104 MG/DL (ref 70–110)
POTASSIUM SERPL-SCNC: 5.3 MMOL/L (ref 3.5–5.1)
PROT SERPL-MCNC: 7.7 GM/DL (ref 6–8.4)
SODIUM SERPL-SCNC: 142 MMOL/L (ref 136–145)

## 2025-06-19 PROCEDURE — 36415 COLL VENOUS BLD VENIPUNCTURE: CPT | Mod: PO

## 2025-06-19 PROCEDURE — 83880 ASSAY OF NATRIURETIC PEPTIDE: CPT

## 2025-06-19 PROCEDURE — 80053 COMPREHEN METABOLIC PANEL: CPT

## 2025-06-19 PROCEDURE — 99214 OFFICE O/P EST MOD 30 MIN: CPT | Mod: S$PBB,,,

## 2025-06-19 PROCEDURE — 99999 PR PBB SHADOW E&M-EST. PATIENT-LVL III: CPT | Mod: PBBFAC,,,

## 2025-06-19 PROCEDURE — 99213 OFFICE O/P EST LOW 20 MIN: CPT | Mod: PBBFAC,PO

## 2025-06-19 RX ORDER — FUROSEMIDE 20 MG/1
20 TABLET ORAL DAILY
Qty: 30 TABLET | Refills: 6 | Status: SHIPPED | OUTPATIENT
Start: 2025-06-19

## 2025-06-19 NOTE — PROGRESS NOTES
Subjective:   Patient ID:  Marysol Yancey is a 89 y.o. female who presents for evaluation of swelling. Her current medical conditions include diastolic heart failure, CAD, hx of cardiac arrest with ICD, HTN, HLD    Hospital Course 5/21-5/28    HPI:  Ms. Yancey is a 89 year old female patient whose current medical conditions include CAD, cardiac arrest s/p AICD, CHF, CRI, depression, DM type II, HLD, HTN, LBBB, mitral regurgitation, thyroid disease, and CAD who presented to [facility] ED this AM due to increased SOB over the past few weeks that acutely worsened this AM. Associated symptoms included BLE edema and orthopnea. She denied any associated fever, chills, palpitations, near syncope, or syncope. Initial workup in ED revealed creatinine of 1.9, BNP of 640, troponin of 0.076. CXR showed chronic ILD as well as edema/ ? underlying PNA. Patient given IV Lasix and admitted for further evaluation and treatment. Cardiology consulted to assist with management. Patient seen and examined today, resting in bed.  Still SOB/overloaded. Denies any CP symptoms. States she tried purchasing something OTC for fluid removal but it did not help. She reports compliance with her medications. Last seen in clinic by Dr. Mares in 2022. Prior LHC in 2013 showing LAD 30-40% 100% RCA occlusion, OM1 40-50%      Hospital Course:   5/22/2025 - Patient seen and examined today. She is laying in bed in no acute distress. No acute events overnight. She reports she is still short of breath. Denies chest pain.      5/23/2025-Patient seen and examined today, resting in bed. More SOB this AM after Chin was removed. Complains of urinary retention/abdominal bloating. BLE edema improving. Labs reviewed. Creatinine 1.9.    6/19/2025  Patient presents for follow up after recent hospital stay. She reports that she feels she is retaining too much fluid again, she feels this mostly in her legs and her belly. She does not have any shortness of breath. She is  currently on lasix 20 mg once daily. She mostly eats frozen meals as she does not have anyone to cook for her. She attempted to get meals on wheels but was denied. She is not currently weighing herself either. Blood pressures well controlled. She was discharged on oxygen but is not wearing it today. She is unsure if she should be wearing her oxygen or not and has not followed up with her PCP.   She denies all other symptoms including chest pain, dyspnea, palpitations, pre-syncope, syncope, orthopnea, PND     Past Medical History:   Diagnosis Date    Abnormal ECG 12/17/2014    AICD (automatic cardioverter/defibrillator) present 6/14/2013    CAD (coronary artery disease) 6/14/2013    Cancer     basil cell carcinoma    Cardiac arrest 6/14/2013    CHF (congestive heart failure)     CRI (chronic renal insufficiency) 6/14/2013    Depression 6/14/2013    Diabetes mellitus     Fatigue 6/14/2013    Heart block     Hyperlipidemia     Hypertension     LBBB (left bundle branch block) 12/17/2014    Mitral regurgitation 6/14/2013    Mixed hyperlipidemia 6/14/2013    Pulmonary nodule 6/14/2013    Thyroid disease     TR (tricuspid regurgitation) 6/14/2013    Ventricular fibrillation 6/14/2013       History reviewed. No pertinent surgical history.    Social History[1]    Family History   Problem Relation Name Age of Onset    Heart attack Father      Hyperlipidemia Father      Hypertension Father      Anemia Neg Hx      Arrhythmia Neg Hx      Asthma Neg Hx      Clotting disorder Neg Hx      Fainting Neg Hx      Heart disease Neg Hx      Heart failure Neg Hx      Hypertension Daughter         Wt Readings from Last 3 Encounters:   06/19/25 67.8 kg (149 lb 9.3 oz)   05/22/25 73.7 kg (162 lb 7.7 oz)   09/20/23 63.3 kg (139 lb 8.8 oz)     Temp Readings from Last 3 Encounters:   05/23/25 98.7 °F (37.1 °C)   09/21/23 98.6 °F (37 °C)   08/27/23 98.2 °F (36.8 °C) (Oral)     BP Readings from Last 3 Encounters:   06/19/25 130/80   05/23/25 (!)  146/84   09/21/23 (!) 167/70     Pulse Readings from Last 3 Encounters:   06/19/25 67   05/23/25 70   09/21/23 87       Medications Ordered Prior to Encounter[2]    No cardiac monitor results found for the past 12 months    Results for orders placed during the hospital encounter of 05/21/25    Echo    Interpretation Summary    Left Ventricle: The left ventricle is normal in size. Moderately increased wall thickness. There is concentric hypertrophy. Normal wall motion. Septal motion is consistent with bundle branch block. There is low normal systolic function. Ejection fraction is approximately 50%.    Right Ventricle: The right ventricle is normal in size Wall thickness is normal. Systolic function is normal.    Pulmonary Artery: The estimated pulmonary artery systolic pressure is 26 mmHg.    IVC/SVC: Normal venous pressure at 3 mmHg.    No results found for this or any previous visit.        Results for orders placed or performed during the hospital encounter of 05/21/25   EKG 12-lead    Collection Time: 05/21/25  8:21 AM   Result Value Ref Range    QRS Duration 124 ms    [facility] QTC Calculation 511 ms    Narrative    Test Reason : R06.02,    Vent. Rate : 109 BPM     Atrial Rate : 109 BPM     P-R Int : 176 ms          QRS Dur : 124 ms      QT Int : 380 ms       P-R-T Axes :  34  -4 133 degrees    QTcB Int : 511 ms    Sinus tachycardia with Premature atrial complexes  Left bundle branch block  When compared with ECG of 20-Sep-2023 04:33,  Left bundle branch block is now Present  Criteria for Anterior infarct are no longer Present  Confirmed by Samuel Mares ([id number]) on 5/21/2025 4:15:51 PM    Referred By: AAAREFERRAL SELF           Confirmed By: Samuel Mares         Review of Systems   Constitutional: Negative.   HENT: Negative.     Eyes: Negative.    Cardiovascular:  Positive for leg swelling. Negative for chest pain, dyspnea on exertion, irregular heartbeat, near-syncope, orthopnea, palpitations, paroxysmal  nocturnal dyspnea and syncope.   Respiratory: Negative.     Skin: Negative.    Musculoskeletal: Negative.    Gastrointestinal:  Positive for bloating.   Genitourinary: Negative.    Neurological: Negative.    Psychiatric/Behavioral: Negative.           Physical Exam  Vitals (using walker) and nursing note reviewed.   Constitutional:       Appearance: Normal appearance.   HENT:      Head: Normocephalic.   Eyes:      Pupils: Pupils are equal, round, and reactive to light.   Cardiovascular:      Rate and Rhythm: Normal rate and regular rhythm.      Heart sounds: Normal heart sounds, S1 normal and S2 normal. No murmur heard.     No S3 or S4 sounds.   Pulmonary:      Effort: Pulmonary effort is normal.      Breath sounds: Normal breath sounds.   Abdominal:      General: Bowel sounds are normal.      Palpations: Abdomen is soft.   Musculoskeletal:      Cervical back: Normal range of motion.      Right lower leg: Edema (trace) present.      Left lower leg: Edema (trace) present.   Skin:     Capillary Refill: Capillary refill takes less than 2 seconds.   Neurological:      General: No focal deficit present.      Mental Status: She is alert and oriented to person, place, and time.   Psychiatric:         Mood and Affect: Mood normal.         Behavior: Behavior normal.         Thought Content: Thought content normal.         Lab Results   Component Value Date    CHOL 212 (H) 04/03/2013    CHOL 233 (H) 03/17/2010     Lab Results   Component Value Date    HDL 41 04/03/2013    HDL 49 03/17/2010     Lab Results   Component Value Date    LDLCALC 125.0 04/03/2013    LDLCALC 133.8 (H) 03/17/2010     Lab Results   Component Value Date    TRIG 229 (H) 04/03/2013    TRIG 251 (H) 03/17/2010     Lab Results   Component Value Date    CHOLHDL 19.3 (L) 04/03/2013    CHOLHDL 21.0 03/17/2010       Chemistry        Component Value Date/Time     05/23/2025 0432     09/21/2023 0507    K 4.0 05/23/2025 0432    K 4.1 09/21/2023 0507      05/23/2025 0432     09/21/2023 0507    CO2 26 05/23/2025 0432    CO2 19 (L) 09/21/2023 0507    BUN 33 (H) 05/23/2025 0432    CREATININE 1.9 (H) 05/23/2025 0432    GLU 97 05/23/2025 0432    GLU 90 09/21/2023 0507        Component Value Date/Time    CALCIUM 8.8 05/23/2025 0432    CALCIUM 8.8 09/21/2023 0507    ALKPHOS 62 05/23/2025 0432    ALKPHOS 59 09/21/2023 0507    AST 18 05/23/2025 0432    AST 25 09/21/2023 0507    ALT 13 05/23/2025 0432    ALT 10 09/21/2023 0507    BILITOT 0.4 05/23/2025 0432    BILITOT 0.7 09/21/2023 0507    ESTGFRAFRICA 40 (L) 05/31/2013 1127    EGFRNONAA 33 (L) 05/31/2013 1127          Lab Results   Component Value Date    TSH 5.45 (H) 11/09/2023     Lab Results   Component Value Date    INR 1.1 08/19/2022    INR 1.1 01/19/2013    INR 1.1 01/18/2013     Lab Results   Component Value Date    WBC 6.67 05/23/2025    HGB 12.3 05/23/2025    HCT 39.9 05/23/2025    MCV 99 (H) 05/23/2025     05/23/2025       Assessment:      1. Chronic diastolic heart failure    2. Coronary artery disease involving native coronary artery of native heart without angina pectoris    3. Primary hypertension    4. Mixed hyperlipidemia    5. History of cardiac arrest    6. ICD (implantable cardioverter-defibrillator) in place    7. Chronic ischemic heart disease        Plan:   Chronic diastolic heart failure  -     furosemide (LASIX) 20 MG tablet; Take 1 tablet (20 mg total) by mouth once daily. HOLD FOR  BP WITH TOP NUMBER(systolic pressure) <110 OR BOTTOM NUMBER (diastolic pressure) <70  Dispense: 30 tablet; Refill: 6  -     BNP; Future; Expected date: 06/19/2025  -     Comprehensive Metabolic Panel; Future; Expected date: 06/19/2025    Coronary artery disease involving native coronary artery of native heart without angina pectoris    Primary hypertension    Mixed hyperlipidemia    History of cardiac arrest    ICD (implantable cardioverter-defibrillator) in place    Chronic ischemic heart  disease      BNP, CMP, will adjust diuretics if necessary - diastolic heart failure  Low salt diet, daily weights, continue lasix 20 mg   Continue coreg, clonidine - HTN  Continue asa - CAD  Encouraged follow up with PCP  RTC 3 months with Dr. Jones Haley Smith, PA-C [facility]           [1]   Social History  Tobacco Use    Smoking status: Never    Smokeless tobacco: Never   Substance Use Topics    Alcohol use: Yes     Alcohol/week: 7.0 standard drinks of alcohol     Types: 7 Glasses of wine per week    Drug use: No   [2]   Current Outpatient Medications on File Prior to Visit   Medication Sig Dispense Refill    aspirin (ECOTRIN) 81 MG EC tablet Take 1 tablet (81 mg total) by mouth once daily. 30 tablet 0    carvediloL (COREG) 6.25 MG tablet Take 1 tablet (6.25 mg total) by mouth 2 (two) times daily with meals. HOLD FOR PULSE<65 OR BP WITH TOP NUMBER(systolic pressure) <110 OR BOTTOM NUMBER (diastolic pressure) <70      cloNIDine (CATAPRES) 0.2 MG tablet Take 1 tablet (0.2 mg total) by mouth 2 (two) times daily. HOLD FOR  BP WITH TOP NUMBER(systolic pressure) <110 OR BOTTOM NUMBER (diastolic pressure) <70 60 tablet 0    levothyroxine (SYNTHROID) 25 MCG tablet Take 25 mcg by mouth every morning.      [DISCONTINUED] furosemide (LASIX) 20 MG tablet Take 1 tablet (20 mg total) by mouth once daily. HOLD FOR  BP WITH TOP NUMBER(systolic pressure) <110 OR BOTTOM NUMBER (diastolic pressure) <70 30 tablet 0    [DISCONTINUED] sertraline (ZOLOFT) 25 MG tablet Take 25 mg by mouth once daily.       No current facility-administered medications on file prior to visit.

## 2025-06-20 ENCOUNTER — RESULTS FOLLOW-UP (OUTPATIENT)
Dept: CARDIOLOGY | Facility: CLINIC | Age: OVER 89
End: 2025-06-20

## 2025-06-23 ENCOUNTER — TELEPHONE (OUTPATIENT)
Dept: CARDIOLOGY | Facility: CLINIC | Age: OVER 89
End: 2025-06-23

## 2025-07-09 ENCOUNTER — TELEPHONE (OUTPATIENT)
Dept: CARDIOLOGY | Facility: CLINIC | Age: OVER 89
End: 2025-07-09
Payer: MEDICARE

## 2025-07-09 DIAGNOSIS — I50.32 CHRONIC DIASTOLIC HEART FAILURE: Primary | ICD-10-CM

## 2025-07-09 RX ORDER — TORSEMIDE 20 MG/1
20 TABLET ORAL DAILY PRN
Qty: 30 TABLET | Refills: 11 | Status: SHIPPED | OUTPATIENT
Start: 2025-07-09 | End: 2026-07-09

## 2025-07-09 NOTE — TELEPHONE ENCOUNTER
Spoke with patient let her know [facility] will send in new script torsemide for leg swelling and weight gain.    Copied from CRM #[id number]. Topic: General Inquiry - Return Call  >> Jul 9, 2025 11:50 AM Elisha wrote:  Type:  Patient Returning Call    Who Called:Marysol   Who Left Message for Patient:nurse   Does the patient know what this is regarding?:yes  Would the patient rather a call back or a response via [facility]? Call back   Best Call Back Number:[phone]  Additional Information:

## 2025-07-09 NOTE — TELEPHONE ENCOUNTER
Patient called stated that she was just prescribed lasix but is allergic to it and wants to know is there another medication she can take.